# Patient Record
Sex: FEMALE | Race: WHITE | NOT HISPANIC OR LATINO | Employment: UNEMPLOYED | URBAN - METROPOLITAN AREA
[De-identification: names, ages, dates, MRNs, and addresses within clinical notes are randomized per-mention and may not be internally consistent; named-entity substitution may affect disease eponyms.]

---

## 2017-01-04 ENCOUNTER — ALLSCRIPTS OFFICE VISIT (OUTPATIENT)
Dept: OTHER | Facility: OTHER | Age: 57
End: 2017-01-04

## 2017-01-19 DIAGNOSIS — Z12.39 ENCOUNTER FOR OTHER SCREENING FOR MALIGNANT NEOPLASM OF BREAST: ICD-10-CM

## 2017-01-25 ENCOUNTER — APPOINTMENT (OUTPATIENT)
Dept: LAB | Facility: HOSPITAL | Age: 57
End: 2017-01-25
Attending: INTERNAL MEDICINE
Payer: COMMERCIAL

## 2017-01-25 ENCOUNTER — TRANSCRIBE ORDERS (OUTPATIENT)
Dept: ADMINISTRATIVE | Facility: HOSPITAL | Age: 57
End: 2017-01-25

## 2017-01-25 DIAGNOSIS — I10 ESSENTIAL (PRIMARY) HYPERTENSION: ICD-10-CM

## 2017-01-25 LAB
ALBUMIN SERPL BCP-MCNC: 3.6 G/DL (ref 3.5–5)
ALP SERPL-CCNC: 132 U/L (ref 46–116)
ALT SERPL W P-5'-P-CCNC: 28 U/L (ref 12–78)
ANION GAP SERPL CALCULATED.3IONS-SCNC: 13 MMOL/L (ref 4–13)
AST SERPL W P-5'-P-CCNC: 14 U/L (ref 5–45)
BILIRUB SERPL-MCNC: 0.3 MG/DL (ref 0.2–1)
BUN SERPL-MCNC: 11 MG/DL (ref 5–25)
CALCIUM SERPL-MCNC: 8.9 MG/DL (ref 8.3–10.1)
CHLORIDE SERPL-SCNC: 103 MMOL/L (ref 100–108)
CHOLEST SERPL-MCNC: 171 MG/DL (ref 50–200)
CO2 SERPL-SCNC: 24 MMOL/L (ref 21–32)
CREAT SERPL-MCNC: 0.79 MG/DL (ref 0.6–1.3)
ERYTHROCYTE [DISTWIDTH] IN BLOOD BY AUTOMATED COUNT: 14.5 % (ref 11.6–15.1)
GFR SERPL CREATININE-BSD FRML MDRD: >60 ML/MIN/1.73SQ M
GLUCOSE SERPL-MCNC: 225 MG/DL (ref 65–140)
HCT VFR BLD AUTO: 44.9 % (ref 34.8–46.1)
HDLC SERPL-MCNC: 44 MG/DL (ref 40–60)
HGB BLD-MCNC: 14.8 G/DL (ref 11.5–15.4)
LDLC SERPL CALC-MCNC: 100 MG/DL (ref 0–100)
MCH RBC QN AUTO: 29.3 PG (ref 26.8–34.3)
MCHC RBC AUTO-ENTMCNC: 33 G/DL (ref 31.4–37.4)
MCV RBC AUTO: 89 FL (ref 82–98)
PLATELET # BLD AUTO: 272 THOUSANDS/UL (ref 149–390)
PMV BLD AUTO: 10 FL (ref 8.9–12.7)
POTASSIUM SERPL-SCNC: 4.2 MMOL/L (ref 3.5–5.3)
PROT SERPL-MCNC: 7.1 G/DL (ref 6.4–8.2)
RBC # BLD AUTO: 5.05 MILLION/UL (ref 3.81–5.12)
SODIUM SERPL-SCNC: 140 MMOL/L (ref 136–145)
TRIGL SERPL-MCNC: 134 MG/DL
WBC # BLD AUTO: 13.4 THOUSAND/UL (ref 4.31–10.16)

## 2017-01-25 PROCEDURE — 85027 COMPLETE CBC AUTOMATED: CPT

## 2017-01-25 PROCEDURE — 80061 LIPID PANEL: CPT

## 2017-01-25 PROCEDURE — 36415 COLL VENOUS BLD VENIPUNCTURE: CPT

## 2017-01-25 PROCEDURE — 80053 COMPREHEN METABOLIC PANEL: CPT

## 2017-02-28 ENCOUNTER — TRANSCRIBE ORDERS (OUTPATIENT)
Dept: LAB | Facility: CLINIC | Age: 57
End: 2017-02-28

## 2017-02-28 ENCOUNTER — APPOINTMENT (OUTPATIENT)
Dept: LAB | Facility: CLINIC | Age: 57
End: 2017-02-28
Payer: COMMERCIAL

## 2017-02-28 ENCOUNTER — ALLSCRIPTS OFFICE VISIT (OUTPATIENT)
Dept: OTHER | Facility: OTHER | Age: 57
End: 2017-02-28

## 2017-02-28 DIAGNOSIS — E11.9 TYPE 2 DIABETES MELLITUS WITHOUT COMPLICATIONS (HCC): ICD-10-CM

## 2017-02-28 LAB
CREAT UR-MCNC: 54 MG/DL
EST. AVERAGE GLUCOSE BLD GHB EST-MCNC: 232 MG/DL
HBA1C MFR BLD: 9.7 % (ref 4.2–6.3)
MICROALBUMIN UR-MCNC: 6.1 MG/L (ref 0–20)
MICROALBUMIN/CREAT 24H UR: 11 MG/G CREATININE (ref 0–30)

## 2017-02-28 PROCEDURE — 82043 UR ALBUMIN QUANTITATIVE: CPT

## 2017-02-28 PROCEDURE — 36415 COLL VENOUS BLD VENIPUNCTURE: CPT

## 2017-02-28 PROCEDURE — 82570 ASSAY OF URINE CREATININE: CPT

## 2017-02-28 PROCEDURE — 83036 HEMOGLOBIN GLYCOSYLATED A1C: CPT

## 2017-03-08 ENCOUNTER — GENERIC CONVERSION - ENCOUNTER (OUTPATIENT)
Dept: OTHER | Facility: OTHER | Age: 57
End: 2017-03-08

## 2017-04-05 ENCOUNTER — ALLSCRIPTS OFFICE VISIT (OUTPATIENT)
Dept: OTHER | Facility: OTHER | Age: 57
End: 2017-04-05

## 2017-04-05 ENCOUNTER — TRANSCRIBE ORDERS (OUTPATIENT)
Dept: LAB | Facility: CLINIC | Age: 57
End: 2017-04-05

## 2017-04-05 ENCOUNTER — APPOINTMENT (OUTPATIENT)
Dept: LAB | Facility: CLINIC | Age: 57
End: 2017-04-05
Payer: COMMERCIAL

## 2017-04-05 DIAGNOSIS — E11.9 TYPE 2 DIABETES MELLITUS WITHOUT COMPLICATIONS (HCC): ICD-10-CM

## 2017-04-05 DIAGNOSIS — E11.9 DIABETES MELLITUS WITH NO COMPLICATION (HCC): Primary | ICD-10-CM

## 2017-04-05 LAB
ALBUMIN SERPL BCP-MCNC: 3.7 G/DL (ref 3.5–5)
ALP SERPL-CCNC: 101 U/L (ref 46–116)
ALT SERPL W P-5'-P-CCNC: 29 U/L (ref 12–78)
ANION GAP SERPL CALCULATED.3IONS-SCNC: 7 MMOL/L (ref 4–13)
AST SERPL W P-5'-P-CCNC: 10 U/L (ref 5–45)
BILIRUB SERPL-MCNC: 0.28 MG/DL (ref 0.2–1)
BUN SERPL-MCNC: 13 MG/DL (ref 5–25)
CALCIUM SERPL-MCNC: 9 MG/DL (ref 8.3–10.1)
CHLORIDE SERPL-SCNC: 104 MMOL/L (ref 100–108)
CHOLEST SERPL-MCNC: 132 MG/DL (ref 50–200)
CO2 SERPL-SCNC: 25 MMOL/L (ref 21–32)
CREAT SERPL-MCNC: 0.85 MG/DL (ref 0.6–1.3)
ERYTHROCYTE [DISTWIDTH] IN BLOOD BY AUTOMATED COUNT: 14.5 % (ref 11.6–15.1)
GFR SERPL CREATININE-BSD FRML MDRD: >60 ML/MIN/1.73SQ M
GLUCOSE P FAST SERPL-MCNC: 129 MG/DL (ref 65–99)
HCT VFR BLD AUTO: 44.4 % (ref 34.8–46.1)
HDLC SERPL-MCNC: 34 MG/DL (ref 40–60)
HGB BLD-MCNC: 14.7 G/DL (ref 11.5–15.4)
LDLC SERPL CALC-MCNC: 64 MG/DL (ref 0–100)
MCH RBC QN AUTO: 29.6 PG (ref 26.8–34.3)
MCHC RBC AUTO-ENTMCNC: 33.1 G/DL (ref 31.4–37.4)
MCV RBC AUTO: 90 FL (ref 82–98)
PLATELET # BLD AUTO: 258 THOUSANDS/UL (ref 149–390)
PMV BLD AUTO: 10.9 FL (ref 8.9–12.7)
POTASSIUM SERPL-SCNC: 3.8 MMOL/L (ref 3.5–5.3)
PROT SERPL-MCNC: 7.6 G/DL (ref 6.4–8.2)
RBC # BLD AUTO: 4.96 MILLION/UL (ref 3.81–5.12)
SODIUM SERPL-SCNC: 136 MMOL/L (ref 136–145)
TRIGL SERPL-MCNC: 170 MG/DL
WBC # BLD AUTO: 13.86 THOUSAND/UL (ref 4.31–10.16)

## 2017-04-05 PROCEDURE — 80053 COMPREHEN METABOLIC PANEL: CPT

## 2017-04-05 PROCEDURE — 80061 LIPID PANEL: CPT

## 2017-04-05 PROCEDURE — 85027 COMPLETE CBC AUTOMATED: CPT

## 2017-04-05 PROCEDURE — 36415 COLL VENOUS BLD VENIPUNCTURE: CPT

## 2017-04-05 PROCEDURE — 83036 HEMOGLOBIN GLYCOSYLATED A1C: CPT

## 2017-04-06 LAB
EST. AVERAGE GLUCOSE BLD GHB EST-MCNC: 203 MG/DL
HBA1C MFR BLD: 8.7 % (ref 4.2–6.3)

## 2017-05-01 ENCOUNTER — HOSPITAL ENCOUNTER (OUTPATIENT)
Dept: NON INVASIVE DIAGNOSTICS | Facility: HOSPITAL | Age: 57
Discharge: HOME | End: 2017-05-01
Attending: INTERNAL MEDICINE
Payer: COMMERCIAL

## 2017-05-01 ENCOUNTER — HOSPITAL ENCOUNTER (OUTPATIENT)
Dept: MAMMOGRAPHY | Facility: HOSPITAL | Age: 57
Discharge: HOME | End: 2017-05-01
Attending: INTERNAL MEDICINE
Payer: COMMERCIAL

## 2017-05-01 DIAGNOSIS — Z12.39 ENCOUNTER FOR OTHER SCREENING FOR MALIGNANT NEOPLASM OF BREAST: ICD-10-CM

## 2017-05-01 DIAGNOSIS — E11.9 DIABETES MELLITUS WITH NO COMPLICATION (HCC): ICD-10-CM

## 2017-05-01 PROCEDURE — 77063 BREAST TOMOSYNTHESIS BI: CPT

## 2017-05-01 PROCEDURE — 93005 ELECTROCARDIOGRAM TRACING: CPT

## 2017-05-01 PROCEDURE — G0202 SCR MAMMO BI INCL CAD: HCPCS

## 2017-05-02 LAB
ATRIAL RATE: 80 BPM
P AXIS: 25 DEGREES
PR INTERVAL: 150 MS
QRS AXIS: -5 DEGREES
QRSD INTERVAL: 132 MS
QT INTERVAL: 396 MS
QTC INTERVAL: 456 MS
T WAVE AXIS: 20 DEGREES
VENTRICULAR RATE: 80 BPM

## 2017-06-20 ENCOUNTER — ALLSCRIPTS OFFICE VISIT (OUTPATIENT)
Dept: OTHER | Facility: OTHER | Age: 57
End: 2017-06-20

## 2017-06-21 ENCOUNTER — HOSPITAL ENCOUNTER (EMERGENCY)
Facility: HOSPITAL | Age: 57
Discharge: HOME | End: 2017-06-21
Attending: EMERGENCY MEDICINE | Admitting: EMERGENCY MEDICINE
Payer: COMMERCIAL

## 2017-06-21 VITALS
WEIGHT: 208.34 LBS | RESPIRATION RATE: 18 BRPM | OXYGEN SATURATION: 98 % | DIASTOLIC BLOOD PRESSURE: 88 MMHG | TEMPERATURE: 98 F | HEART RATE: 100 BPM | BODY MASS INDEX: 35.76 KG/M2 | SYSTOLIC BLOOD PRESSURE: 189 MMHG

## 2017-06-21 DIAGNOSIS — L03.90 CELLULITIS: Primary | ICD-10-CM

## 2017-06-21 PROCEDURE — 87205 SMEAR GRAM STAIN: CPT | Performed by: EMERGENCY MEDICINE

## 2017-06-21 PROCEDURE — 87070 CULTURE OTHR SPECIMN AEROBIC: CPT | Performed by: EMERGENCY MEDICINE

## 2017-06-21 PROCEDURE — 99282 EMERGENCY DEPT VISIT SF MDM: CPT

## 2017-06-21 RX ORDER — CLINDAMYCIN HYDROCHLORIDE 300 MG/1
300 CAPSULE ORAL 4 TIMES DAILY
Qty: 40 CAPSULE | Refills: 0 | Status: SHIPPED | OUTPATIENT
Start: 2017-06-21 | End: 2017-07-01

## 2017-06-21 RX ORDER — CLINDAMYCIN HYDROCHLORIDE 150 MG/1
300 CAPSULE ORAL ONCE
Status: COMPLETED | OUTPATIENT
Start: 2017-06-21 | End: 2017-06-21

## 2017-06-21 RX ORDER — LOSARTAN POTASSIUM 50 MG/1
50 TABLET ORAL DAILY
COMMUNITY
End: 2018-03-19 | Stop reason: SDUPTHER

## 2017-06-21 RX ORDER — GLIPIZIDE 5 MG/1
5 TABLET ORAL DAILY
COMMUNITY
End: 2018-03-19 | Stop reason: SDUPTHER

## 2017-06-21 RX ADMIN — CLINDAMYCIN HYDROCHLORIDE 300 MG: 150 CAPSULE ORAL at 22:49

## 2017-06-24 LAB
BACTERIA WND AEROBE CULT: NORMAL
GRAM STN SPEC: NORMAL
GRAM STN SPEC: NORMAL

## 2017-06-26 ENCOUNTER — ALLSCRIPTS OFFICE VISIT (OUTPATIENT)
Dept: OTHER | Facility: OTHER | Age: 57
End: 2017-06-26

## 2017-06-26 DIAGNOSIS — E11.9 TYPE 2 DIABETES MELLITUS WITHOUT COMPLICATIONS (HCC): ICD-10-CM

## 2017-06-29 ENCOUNTER — APPOINTMENT (EMERGENCY)
Dept: RADIOLOGY | Facility: HOSPITAL | Age: 57
End: 2017-06-29
Payer: COMMERCIAL

## 2017-06-29 ENCOUNTER — HOSPITAL ENCOUNTER (EMERGENCY)
Facility: HOSPITAL | Age: 57
Discharge: HOME | End: 2017-06-29
Attending: EMERGENCY MEDICINE
Payer: COMMERCIAL

## 2017-06-29 VITALS
OXYGEN SATURATION: 98 % | BODY MASS INDEX: 36.32 KG/M2 | WEIGHT: 212.74 LBS | SYSTOLIC BLOOD PRESSURE: 165 MMHG | HEIGHT: 64 IN | TEMPERATURE: 98.6 F | DIASTOLIC BLOOD PRESSURE: 66 MMHG | RESPIRATION RATE: 16 BRPM | HEART RATE: 86 BPM

## 2017-06-29 DIAGNOSIS — S99.922A TOE INJURY, LEFT, INITIAL ENCOUNTER: Primary | ICD-10-CM

## 2017-06-29 PROCEDURE — 99283 EMERGENCY DEPT VISIT LOW MDM: CPT

## 2017-06-29 PROCEDURE — 73630 X-RAY EXAM OF FOOT: CPT

## 2017-06-29 RX ORDER — ALPRAZOLAM 0.25 MG/1
0.25 TABLET ORAL
COMMUNITY
End: 2022-01-03 | Stop reason: ALTCHOICE

## 2017-06-29 RX ORDER — IBUPROFEN 600 MG/1
600 TABLET ORAL ONCE
Status: COMPLETED | OUTPATIENT
Start: 2017-06-29 | End: 2017-06-29

## 2017-06-29 RX ORDER — IBUPROFEN 600 MG/1
600 TABLET ORAL EVERY 6 HOURS PRN
Qty: 30 TABLET | Refills: 0 | Status: SHIPPED | OUTPATIENT
Start: 2017-06-29 | End: 2018-10-03

## 2017-06-29 RX ORDER — NITROGLYCERIN 0.4 MG/1
0.4 TABLET SUBLINGUAL
COMMUNITY
End: 2019-11-04

## 2017-06-29 RX ADMIN — IBUPROFEN 600 MG: 600 TABLET, FILM COATED ORAL at 09:54

## 2017-07-19 ENCOUNTER — APPOINTMENT (OUTPATIENT)
Dept: LAB | Facility: CLINIC | Age: 57
End: 2017-07-19
Payer: COMMERCIAL

## 2017-07-19 ENCOUNTER — TRANSCRIBE ORDERS (OUTPATIENT)
Dept: LAB | Facility: CLINIC | Age: 57
End: 2017-07-19

## 2017-07-19 DIAGNOSIS — E11.9 TYPE 2 DIABETES MELLITUS WITHOUT COMPLICATIONS (HCC): ICD-10-CM

## 2017-07-19 LAB
ALBUMIN SERPL BCP-MCNC: 3.6 G/DL (ref 3.5–5)
ALP SERPL-CCNC: 102 U/L (ref 46–116)
ALT SERPL W P-5'-P-CCNC: 19 U/L (ref 12–78)
ANION GAP SERPL CALCULATED.3IONS-SCNC: 8 MMOL/L (ref 4–13)
AST SERPL W P-5'-P-CCNC: 10 U/L (ref 5–45)
BILIRUB SERPL-MCNC: 0.24 MG/DL (ref 0.2–1)
BUN SERPL-MCNC: 13 MG/DL (ref 5–25)
CALCIUM SERPL-MCNC: 8.8 MG/DL (ref 8.3–10.1)
CHLORIDE SERPL-SCNC: 106 MMOL/L (ref 100–108)
CHOLEST SERPL-MCNC: 154 MG/DL (ref 50–200)
CO2 SERPL-SCNC: 25 MMOL/L (ref 21–32)
CREAT SERPL-MCNC: 0.73 MG/DL (ref 0.6–1.3)
ERYTHROCYTE [DISTWIDTH] IN BLOOD BY AUTOMATED COUNT: 15.3 % (ref 11.6–15.1)
EST. AVERAGE GLUCOSE BLD GHB EST-MCNC: 148 MG/DL
GFR SERPL CREATININE-BSD FRML MDRD: >60 ML/MIN/1.73SQ M
GLUCOSE P FAST SERPL-MCNC: 94 MG/DL (ref 65–99)
HBA1C MFR BLD: 6.8 % (ref 4.2–6.3)
HCT VFR BLD AUTO: 40.6 % (ref 34.8–46.1)
HDLC SERPL-MCNC: 40 MG/DL (ref 40–60)
HGB BLD-MCNC: 13.3 G/DL (ref 11.5–15.4)
LDLC SERPL CALC-MCNC: 97 MG/DL (ref 0–100)
MCH RBC QN AUTO: 29.1 PG (ref 26.8–34.3)
MCHC RBC AUTO-ENTMCNC: 32.8 G/DL (ref 31.4–37.4)
MCV RBC AUTO: 89 FL (ref 82–98)
PLATELET # BLD AUTO: 291 THOUSANDS/UL (ref 149–390)
PMV BLD AUTO: 10.9 FL (ref 8.9–12.7)
POTASSIUM SERPL-SCNC: 3.6 MMOL/L (ref 3.5–5.3)
PROT SERPL-MCNC: 7.6 G/DL (ref 6.4–8.2)
RBC # BLD AUTO: 4.57 MILLION/UL (ref 3.81–5.12)
SODIUM SERPL-SCNC: 139 MMOL/L (ref 136–145)
TRIGL SERPL-MCNC: 85 MG/DL
WBC # BLD AUTO: 12.85 THOUSAND/UL (ref 4.31–10.16)

## 2017-07-19 PROCEDURE — 85027 COMPLETE CBC AUTOMATED: CPT

## 2017-07-19 PROCEDURE — 83036 HEMOGLOBIN GLYCOSYLATED A1C: CPT

## 2017-07-19 PROCEDURE — 80061 LIPID PANEL: CPT

## 2017-07-19 PROCEDURE — 80053 COMPREHEN METABOLIC PANEL: CPT

## 2017-07-19 PROCEDURE — 36415 COLL VENOUS BLD VENIPUNCTURE: CPT

## 2017-07-26 ENCOUNTER — ALLSCRIPTS OFFICE VISIT (OUTPATIENT)
Dept: OTHER | Facility: OTHER | Age: 57
End: 2017-07-26

## 2017-11-14 ENCOUNTER — GENERIC CONVERSION - ENCOUNTER (OUTPATIENT)
Dept: OTHER | Facility: OTHER | Age: 57
End: 2017-11-14

## 2017-11-14 DIAGNOSIS — I10 ESSENTIAL (PRIMARY) HYPERTENSION: ICD-10-CM

## 2017-11-14 DIAGNOSIS — E66.9 OBESITY: ICD-10-CM

## 2017-11-14 DIAGNOSIS — E78.5 HYPERLIPIDEMIA: ICD-10-CM

## 2017-11-14 DIAGNOSIS — F41.9 ANXIETY DISORDER: ICD-10-CM

## 2017-11-14 DIAGNOSIS — I25.10 ATHEROSCLEROTIC HEART DISEASE OF NATIVE CORONARY ARTERY WITHOUT ANGINA PECTORIS: ICD-10-CM

## 2017-11-14 DIAGNOSIS — E11.9 TYPE 2 DIABETES MELLITUS WITHOUT COMPLICATIONS (HCC): ICD-10-CM

## 2017-11-15 ENCOUNTER — LAB (OUTPATIENT)
Dept: LAB | Facility: CLINIC | Age: 57
End: 2017-11-15
Payer: COMMERCIAL

## 2017-11-15 ENCOUNTER — TRANSCRIBE ORDERS (OUTPATIENT)
Dept: LAB | Facility: CLINIC | Age: 57
End: 2017-11-15

## 2017-11-15 DIAGNOSIS — Z12.11 ENCOUNTER FOR SCREENING FOR MALIGNANT NEOPLASM OF COLON: Primary | ICD-10-CM

## 2017-11-15 DIAGNOSIS — Z12.11 ENCOUNTER FOR SCREENING FOR MALIGNANT NEOPLASM OF COLON: ICD-10-CM

## 2017-11-15 LAB — HEMOCCULT STL QL IA: NEGATIVE

## 2017-11-15 PROCEDURE — G0328 FECAL BLOOD SCRN IMMUNOASSAY: HCPCS

## 2018-01-11 NOTE — PROGRESS NOTES
Assessment    1  Encounter for preventive health examination (V70 0) (Z00 00)    Plan  Atherosclerotic heart disease of native coronary artery without angina pectoris    · Metoprolol Tartrate 25 MG Oral Tablet; TAKE 1 TABLET TWICE DAILY  PMH: Encounter for diabetes type 2 eye exam    · *VB - Eye Exam; Status:Resulted - Requires Verification,Retrospective Authorization;    Done: 58UAV9878 01:01PM  Type 2 diabetes mellitus    · MetFORMIN HCl - 1000 MG Oral Tablet; take 1 tablet by mouth twice a day    Discussion/Summary    Health maintenance  - Anticipatory guidance given re: diet, exercise, vaccines  - Tobacco cessation: in action stage- continue to assess on fu  - Vaccines up to date  - Mammogram normal, referred for pap and colonoscopy  - Diabetes well controlled on current therapy  - Referred for counseling for symptoms of anxiety  Possible side effects of new medications were reviewed with the patient/guardian today  The treatment plan was reviewed with the patient/guardian  The patient/guardian understands and agrees with the treatment plan      Chief Complaint  physical      History of Present Illness  HPI: Abdoul Thompson is here for a routine physical   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  She rates her health as fair  She does exercise for 2 hrs/week and eats a healthy diet  She has a hx anxiety not on pharmacotherapy at present and has been referred for counseling  She is a current smoker and is trying to quit but unsuccessfully, does not drink alcohol or use recreational drugs  She is menopausal  No reports of high risk sexual behavior  Review of Systems    Constitutional: No fever, no chills, feels well, no tiredness, no recent weight gain or weight loss  Eyes: No complaints of eye pain, no red eyes, no eyesight problems, no discharge, no dry eyes, no itching of eyes     ENT: no complaints of earache, no loss of hearing, no nose bleeds, no nasal discharge, no sore throat, no hoarseness  Cardiovascular: No complaints of slow heart rate, no fast heart rate, no chest pain, no palpitations, no leg claudication, no lower extremity edema  Respiratory: No complaints of shortness of breath, no wheezing, no cough, no SOB on exertion, no orthopnea, no PND  Gastrointestinal: No complaints of abdominal pain, no constipation, no nausea or vomiting, no diarrhea, no bloody stools  Genitourinary: No complaints of dysuria, no incontinence, no pelvic pain, no dysmenorrhea, no vaginal discharge or bleeding  Musculoskeletal: No complaints of arthralgias, no myalgias, no joint swelling or stiffness, no limb pain or swelling  Integumentary: No complaints of skin rash or lesions, no itching, no skin wounds, no breast pain or lump  Neurological: No complaints of headache, no confusion, no convulsions, no numbness, no dizziness or fainting, no tingling, no limb weakness, no difficulty walking  Psychiatric: Not suicidal, no sleep disturbance, no anxiety or depression, no change in personality, no emotional problems  Endocrine: No complaints of proptosis, no hot flashes, no muscle weakness, no deepening of the voice, no feelings of weakness  Hematologic/Lymphatic: No complaints of swollen glands, no swollen glands in the neck, does not bleed easily, does not bruise easily  Active Problems    1  Anxiety (300 00) (F41 9)   2  Atherosclerotic heart disease of native coronary artery without angina pectoris (414 01)   (I25 10)   3  Current tobacco use (305 1) (Z72 0)   4  Dyslipidemia (272 4) (E78 5)   5  Hypertension (401 9) (I10)   6  Obesity (278 00) (E66 9)   7   Type 2 diabetes mellitus (250 00) (E11 9)    Past Medical History    · History of Cellulitis of extremity (L03 119)   · History of Chronic neck pain (723 1,338 29) (M54 2,G89 29)   · History of left bundle branch block (LBBB) (V12 59) (Z86 79)   · History of Mild depression (311) (F32 0)   · History of Tonsillar hypertrophy (474 11) (J35 1)    Surgical History    · History of Cath Stent 1 Type Drug-Eluting    Family History  Mother    · Family history of Arthritis (V17 7)   · Family history of Colon Cancer (V16 0)   · Family history of Coronary Artery Disease (V17 49)   · Family history of Fibrocystic Disease Of Breast   · Family history of Hypertension (V17 49)   · Family history of Ischemic Dilated Cardiomyopathy  Father    · Family history of Arthritis (V17 7)   · Family history of Chronic Kidney Disease (NKF Classification)   · Family history of Coronary Artery Disease (V17 49)   · Family history of Gout (V18 19)   · Family history of Prostate Cancer (V16 42)   · Family history of Pure Hypercholesterolemia   · Family history of Stroke Syndrome (V17 1)   · Family history of Type 2 Diabetes Mellitus  Daughter    · Family history of Thrombocytopenia  Sister    · Family history of Hypertension (V17 49)   · Family history of Hypothyroidism   · Family history of IVP - Solitary Kidney Calculus   · Family history of Rheumatoid Arthritis  Brother    · Family history of Alcoholism   · Family history of Bipolar Disorder NOS   · Family history of IVP - Solitary Kidney Calculus   · Family history of Primary Adrenal Insufficiency ('[place] Disease')   · Family history of Ruptured Cerebral Aneurysm  Maternal Aunt    · Family history of Breast Cancer (V16 3)   · Family history of Peripheral Vascular Disease  Maternal Uncle    · Family history of Lung Cancer (V16 1)   · Family history of Type 2 Diabetes Mellitus    Social History    · Denied: History of Alcohol Use (History)   · Current every day smoker (305 1) (F17 200)   · Current tobacco use (305 1) (Z72 0)   · Denied: History of Drug Use   · Marital History -  (V61 03)   · has 2 children   · Marital History - Single   · Occupation:   · administration  [employer]    Current Meds   1  Accu-Chek Roxana Plus In Vitro Strip; TEST TWICE DAILY;    Therapy: 03ABJ5125 to (Evaluate:54Hzq8033)  Requested for: 95VCF1014; Last   Rx:60Aol1906 Ordered   2  Accu-Chek Roxana Plus w/Device Kit; USE AS DIRECTED  measure blood glucose fasting   and 2 hrs after meals - four times a day; Therapy: 59NSC0056 to (Last Rx:03Mar2017)  Requested for: 48XZH0589 Ordered   3  ALPRAZolam 0 25 MG Oral Tablet; take 1 tablet daily prn; Therapy: 80QUN3171 to (Evaluate:25Oct2016); Last Rx:87Wbi8764 Ordered   4  Aspirin 325 MG Oral Tablet; Take 1 tablet daily Recorded   5  Furosemide 40 MG Oral Tablet; TAKE 1 TABLET DAILY; Therapy: 35Vga9402 to ([id number])  Requested for: 27Tio9241; Last   Rx:37Ibo1050 Ordered   6  GlipiZIDE 5 MG Oral Tablet; Take 1 tablet daily; Therapy: 89HLZ9742 to ([facility])  Requested for: 33VII2795; Last   Rx:14Ocm0580 Ordered   7  Januvia 100 MG Oral Tablet; take 1 tablet by mouth once daily; Therapy: 14XRK4283 to ([facility])  Requested for: 99HQM7469; Last   Rx:93Xcv7808 Ordered   8  Losartan Potassium 50 MG Oral Tablet; take 1 tablet by mouth once daily; Therapy: 58BVU5628 to (Evaluate:67Beq1023)  Requested for: 20Mar2017; Last   Rx:20Mar2017 Ordered   9  MetFORMIN HCl - 1000 MG Oral Tablet; take 1 tablet by mouth twice a day; Therapy: 56IES9115 to (Thyra Muta)  Requested for: 21QTF3922; Last   Rx:48Ndi2873 Ordered   10  Methocarbamol 500 MG Oral Tablet; Take 1 or 2 tabs every 6 hours as needed; Therapy: (Jina Nam) to Recorded   11  Metoprolol Tartrate 25 MG Oral Tablet; TAKE 1 TABLET TWICE DAILY; Therapy: 61DTK3866 to (Evaluate:14Apr2018)  Requested for: 19Apr2017; Last    Rx:19Apr2017 Ordered   12  Multiple Vitamin TABS; Therapy: (Recorded:03Vzu4767) to Recorded   13  Nitroglycerin 0 4 MG Sublingual Tablet Sublingual; DISSOLVE 1 TABLET UNDER THE    TONGUE AS NEEDED FOR CHEST PAIN  Requested for: 20Jun2017; Last    Rx:20Jun2017 Ordered   14  Simvastatin 40 MG Oral Tablet; Take 1 tablet daily;     Therapy: 23Aug2016 to (Last Rx:20Apr2017)  Requested for: 20Apr2017 Ordered   15  Vitamin B12 TABS; TAKE 1 TABLET DAILY AS DIRECTED; Therapy: (Guillermo Ivey) to Recorded   16  Vitamin D 2000 UNIT Oral Capsule; take 1 capsule daily; Therapy: (Recorded:20Jun2017) to Recorded    Allergies    1  HydroCHLOROthiazide TABS   2  ACE Inhibitors   3  Wellbutrin TABS    Vitals   Recorded: 81Ahj5896 12:52PM   Temperature 98 6 F   Heart Rate 81   Respiration 18   Systolic 979   Diastolic 66   Height 5 ft 4 in   Weight 201 lb    BMI Calculated 34 5   BSA Calculated 1 96   O2 Saturation 98     Physical Exam    Constitutional   General appearance: No acute distress, well appearing and well nourished  Eyes   Conjunctiva and lids: No swelling, erythema or discharge  Pupils and irises: Equal, round and reactive to light  Ears, Nose, Mouth, and Throat   External inspection of ears and nose: Normal     Otoscopic examination: Tympanic membranes translucent with normal light reflex  Canals patent without erythema  Oropharynx: Normal with no erythema, edema, exudate or lesions  Pulmonary   Respiratory effort: No increased work of breathing or signs of respiratory distress  Auscultation of lungs: Clear to auscultation  Cardiovascular   Palpation of heart: Normal PMI, no thrills  Auscultation of heart: Normal rate and rhythm, normal S1 and S2, without murmurs  Examination of extremities for edema and/or varicosities: Normal     Abdomen   Abdomen: Non-tender, no masses  Liver and spleen: No hepatomegaly or splenomegaly  Lymphatic   Palpation of lymph nodes in neck: No lymphadenopathy  Musculoskeletal   Gait and station: Normal     Digits and nails: Normal without clubbing or cyanosis  Inspection/palpation of joints, bones, and muscles: Normal     Skin   Skin and subcutaneous tissue: Normal without rashes or lesions  Neurologic   Cranial nerves: Cranial nerves 2-12 intact  Reflexes: 2+ and symmetric  Sensation: No sensory loss      Psychiatric   Orientation to person, place, and time: Normal     Mood and affect: Normal        Results/Data  *VB - Eye Exam 38PNO5659 01:01PM Javed Petersen     Test Name Result Flag Reference   Retinopathy Screening      No Retinopathy on exam                         Future Appointments    Date/Time Provider Specialty Site   11/15/2017 11:00 AM MD Waleska Hankins [address]     Signatures   Electronically signed by : Sonia Servin MD; Jul 26 2017  1:31PM EST                       (Author)

## 2018-01-11 NOTE — PROGRESS NOTES
Chief Complaint  PPd test pt aware has to come back at 48-72 hrs      Active Problems    1  Anxiety (300 00) (F41 9)   2  Coronary artery disease (414 00) (I25 10)   3  Dietary counseling (V65 3) (Z71 3)   4  Dyslipidemia (272 4) (E78 5)   5  Exercise counseling (V65 41) (Z71 89)   6  Fatigue (780 79) (R53 83)   7  Hyperglycemia (790 29) (R73 9)   8  Hypertension (401 9) (I10)   9  Left bundle branch block (426 3) (I44 7)   10  Need for influenza vaccination (V04 81) (Z23)   11  Obesity (278 00) (E66 9)   12  PPD screening test (V74 1) (Z11 1)   13  Screening for breast cancer (V76 10) (Z12 39)   14  Screening for colon cancer (V76 51) (Z12 11)   15  Screening for depression (V79 0) (Z13 89)   16  Tobacco use (305 1) (Z72 0)    Current Meds   1  ALPRAZolam 0 25 MG Oral Tablet; TAKE 1 TABLET 3 times daily PRN; Therapy: 24ESJ2458 to (Evaluate:03Oct2015); Last Rx:43Dzu6168 Ordered   2  Aspirin 325 MG Oral Tablet; Take 1 tablet daily Recorded   3  Effient 10 MG Oral Tablet; Take 1 tablet daily; Last Rx:10Mar2015 Ordered   4  Furosemide 40 MG Oral Tablet; TAKE 1 TABLET DAILY; Therapy: 16Rsb4762 to (Johnie Daley)  Requested for: 53Gvc9170; Last   Rx:79Udo4501 Ordered   5  Losartan Potassium 50 MG Oral Tablet; Take 1 tablet daily; Therapy: 73PDN5070 to (Nicky Christensen)  Requested for: 74IMR6709; Last   Rx:03Nov2015 Ordered   6  Metoprolol Tartrate 25 MG Oral Tablet; TAKE 1 TABLET TWICE DAILY; Therapy: 64GRB9815 to (Evaluate:05Jpm0001)  Requested for: 65WDO3428; Last   Rx:06Nov2015 Ordered   7  Multiple Vitamin TABS; Therapy: (Recorded:24Dvw6330) to Recorded   8  Pravastatin Sodium 40 MG Oral Tablet; TAKE 2 TABLETS AT BEDTIME; Therapy: 32BVW9089 to (Evaluate:32Auh2288)  Requested for: 41Xyr9315; Last   Rx:90Cmu8838 Ordered    Allergies    1  Hydrochlorothiazide TABS   2   ACE Inhibitors    Plan  PPD screening test    · PPD    Signatures   Electronically signed by : Magdalena Hoyos MD; Jan 20 2016 9:59AM EST                       (Author)

## 2018-01-12 VITALS
WEIGHT: 211.13 LBS | SYSTOLIC BLOOD PRESSURE: 126 MMHG | OXYGEN SATURATION: 97 % | HEART RATE: 112 BPM | HEIGHT: 64 IN | BODY MASS INDEX: 36.05 KG/M2 | RESPIRATION RATE: 18 BRPM | TEMPERATURE: 97.8 F | DIASTOLIC BLOOD PRESSURE: 68 MMHG

## 2018-01-13 VITALS
WEIGHT: 201 LBS | DIASTOLIC BLOOD PRESSURE: 66 MMHG | BODY MASS INDEX: 34.31 KG/M2 | RESPIRATION RATE: 18 BRPM | HEIGHT: 64 IN | TEMPERATURE: 98.6 F | HEART RATE: 81 BPM | SYSTOLIC BLOOD PRESSURE: 150 MMHG | OXYGEN SATURATION: 98 %

## 2018-01-13 NOTE — PROGRESS NOTES
Chief Complaint  had ppd down on 1/19/2016  came back on 01/21/2016  results are negative      Active Problems    1  Anxiety (300 00) (F41 9)   2  Coronary artery disease (414 00) (I25 10)   3  Dietary counseling (V65 3) (Z71 3)   4  Dyslipidemia (272 4) (E78 5)   5  Exercise counseling (V65 41) (Z71 89)   6  Fatigue (780 79) (R53 83)   7  Hyperglycemia (790 29) (R73 9)   8  Hypertension (401 9) (I10)   9  Left bundle branch block (426 3) (I44 7)   10  Need for influenza vaccination (V04 81) (Z23)   11  Obesity (278 00) (E66 9)   12  PPD screening test (V74 1) (Z11 1)   13  Screening for breast cancer (V76 10) (Z12 39)   14  Screening for colon cancer (V76 51) (Z12 11)   15  Screening for depression (V79 0) (Z13 89)   16  Tobacco use (305 1) (Z72 0)    Current Meds   1  ALPRAZolam 0 25 MG Oral Tablet; TAKE 1 TABLET 3 times daily PRN; Therapy: 27JTG7173 to (Evaluate:03Oct2015); Last Rx:62Pmz2235 Ordered   2  Aspirin 325 MG Oral Tablet; Take 1 tablet daily Recorded   3  Effient 10 MG Oral Tablet; Take 1 tablet daily; Last Rx:10Mar2015 Ordered   4  Furosemide 40 MG Oral Tablet; TAKE 1 TABLET DAILY; Therapy: 31Ura4908 to (Tala Castano)  Requested for: 85Wnd2297; Last   Rx:25Qiz0848 Ordered   5  Losartan Potassium 50 MG Oral Tablet; Take 1 tablet daily; Therapy: 99MGX3273 to (Sharan Claude)  Requested for: 71AOI8632; Last   Rx:03Nov2015 Ordered   6  Metoprolol Tartrate 25 MG Oral Tablet; TAKE 1 TABLET TWICE DAILY; Therapy: 15TKN4175 to (Evaluate:31Oct2016)  Requested for: 67UPC8778; Last   Rx:06Nov2015 Ordered   7  Multiple Vitamin TABS; Therapy: (Recorded:73Imn3154) to Recorded   8  Pravastatin Sodium 40 MG Oral Tablet; TAKE 2 TABLETS AT BEDTIME; Therapy: 55TUP4928 to (Evaluate:40Iok0859)  Requested for: 81Jpn5753; Last   Rx:47Wmg0625 Ordered    Allergies    1  Hydrochlorothiazide TABS   2   ACE Inhibitors    Signatures   Electronically signed by : Natalie Berman MD; Jan 22 2016  8:32AM EST (Author)

## 2018-01-14 VITALS
BODY MASS INDEX: 34.88 KG/M2 | WEIGHT: 204.31 LBS | RESPIRATION RATE: 18 BRPM | SYSTOLIC BLOOD PRESSURE: 140 MMHG | HEART RATE: 107 BPM | HEIGHT: 64 IN | OXYGEN SATURATION: 96 % | DIASTOLIC BLOOD PRESSURE: 90 MMHG | TEMPERATURE: 99 F

## 2018-01-14 VITALS
DIASTOLIC BLOOD PRESSURE: 80 MMHG | HEART RATE: 84 BPM | HEIGHT: 64 IN | SYSTOLIC BLOOD PRESSURE: 164 MMHG | WEIGHT: 201 LBS | BODY MASS INDEX: 34.31 KG/M2

## 2018-01-15 VITALS
BODY MASS INDEX: 36.62 KG/M2 | OXYGEN SATURATION: 98 % | HEIGHT: 64 IN | HEART RATE: 102 BPM | RESPIRATION RATE: 18 BRPM | SYSTOLIC BLOOD PRESSURE: 126 MMHG | DIASTOLIC BLOOD PRESSURE: 82 MMHG | WEIGHT: 214.5 LBS | TEMPERATURE: 97.7 F

## 2018-01-18 NOTE — PROGRESS NOTES
Chief Complaint  ppd      Active Problems    1  Anxiety (300 00) (F41 9)   2  Atherosclerotic heart disease of native coronary artery without angina pectoris (414 01)   (I25 10)   3  Chronic neck pain (723 1,338 29) (M54 2,G89 29)   4  Current tobacco use (305 1) (Z72 0)   5  Dyslipidemia (272 4) (E78 5)   6  Hypertension (401 9) (I10)   7  Left bundle branch block (426 3) (I44 7)   8  Mild depression (311) (F32 0)   9  Need for influenza vaccination (V04 81) (Z23)   10  Obesity (278 00) (E66 9)    Current Meds   1  ALPRAZolam 0 25 MG Oral Tablet; take 1 tablet daily prn; Therapy: 50JYN4086 to (Evaluate:25Oct2016); Last Rx:13Wzl0264 Ordered   2  Aspirin 325 MG Oral Tablet; Take 1 tablet daily Recorded   3  BuPROPion HCl ER (XL) 150 MG Oral Tablet Extended Release 24 Hour; TAKE 1   TABLET DAILY AS DIRECTED; Therapy: 86GRL6476 to (Elieser Blevins)  Requested for: 81MHN1239; Last   Rx:28Nov2016 Ordered   4  Effient 10 MG Oral Tablet; Take 1 tablet daily; Last Rx:10Mar2015 Ordered   5  Furosemide 40 MG Oral Tablet; TAKE 1 TABLET DAILY; Therapy: 27Wfg0056 to (Hi Dewey)  Requested for: 23Ztq7940; Last   Rx:03Sep2015 Ordered   6  Losartan Potassium 50 MG Oral Tablet; take 1 tablet by mouth once daily; Therapy: 69BYC5818 to (Evaluate:17Mar2017)  Requested for: 26ZDN6521; Last   Rx:17Nov2016 Ordered   7  Methocarbamol 500 MG Oral Tablet; TAKE 1-2 TABLET Every 6 hours PRN spasm; Therapy: 63Hzo8491 to (Evaluate:10Oct2016)  Requested for: 11Thp9671; Last   Rx:35Fnu6919 Ordered   8  Metoprolol Tartrate 25 MG Oral Tablet; TAKE 1 TABLET TWICE DAILY; Therapy: 59GMM5772 to (Evaluate:17Mar2017)  Requested for: 72VEP2477; Last   Rx:22Mar2016 Ordered   9  Multiple Vitamin TABS; Therapy: (Recorded:03Sep2015) to Recorded   10  Simvastatin 40 MG Oral Tablet; Take 1 tablet daily; Therapy: 87Ulf4854 to (Last Rx:01Sep2016)  Requested for: 01Sep2016 Ordered    Allergies    1  HydroCHLOROthiazide TABS   2   ACE Inhibitors    Plan  Need for tuberculosis vaccination    · Tubersol 5 UNIT/0 1ML Intradermal Solution    Future Appointments    Date/Time Provider Specialty Site   02/28/2017 11:00 AM Chon Urena MD [address] [facility] PRIMARY [facility]ARE [place] [address]     Signatures   Electronically signed by : Kevin Dyson MD; Jan 4 2017  9:09AM EST                       (Author)

## 2018-01-22 VITALS
DIASTOLIC BLOOD PRESSURE: 70 MMHG | WEIGHT: 213.06 LBS | HEIGHT: 64 IN | HEART RATE: 96 BPM | SYSTOLIC BLOOD PRESSURE: 140 MMHG | TEMPERATURE: 97.6 F | BODY MASS INDEX: 36.37 KG/M2

## 2018-02-05 DIAGNOSIS — E11.9 TYPE 2 DIABETES MELLITUS WITHOUT COMPLICATION, UNSPECIFIED LONG TERM INSULIN USE STATUS: Primary | ICD-10-CM

## 2018-02-13 DIAGNOSIS — E11.9 TYPE 2 DIABETES MELLITUS WITHOUT COMPLICATION, UNSPECIFIED LONG TERM INSULIN USE STATUS: ICD-10-CM

## 2018-03-16 ENCOUNTER — APPOINTMENT (OUTPATIENT)
Dept: LAB | Facility: HOSPITAL | Age: 58
End: 2018-03-16
Payer: COMMERCIAL

## 2018-03-16 ENCOUNTER — TRANSCRIBE ORDERS (OUTPATIENT)
Dept: ADMINISTRATIVE | Facility: HOSPITAL | Age: 58
End: 2018-03-16

## 2018-03-16 DIAGNOSIS — R79.89 ELEVATED TSH: Primary | ICD-10-CM

## 2018-03-16 DIAGNOSIS — E66.9 OBESITY: ICD-10-CM

## 2018-03-16 DIAGNOSIS — I10 ESSENTIAL (PRIMARY) HYPERTENSION: ICD-10-CM

## 2018-03-16 DIAGNOSIS — E78.5 HYPERLIPIDEMIA: ICD-10-CM

## 2018-03-16 DIAGNOSIS — F41.9 ANXIETY DISORDER: ICD-10-CM

## 2018-03-16 DIAGNOSIS — I25.10 ATHEROSCLEROTIC HEART DISEASE OF NATIVE CORONARY ARTERY WITHOUT ANGINA PECTORIS: ICD-10-CM

## 2018-03-16 DIAGNOSIS — E11.9 TYPE 2 DIABETES MELLITUS WITHOUT COMPLICATIONS (HCC): ICD-10-CM

## 2018-03-16 LAB
ALBUMIN SERPL BCP-MCNC: 3.8 G/DL (ref 3.5–5)
ALP SERPL-CCNC: 99 U/L (ref 46–116)
ALT SERPL W P-5'-P-CCNC: 32 U/L (ref 12–78)
ANION GAP SERPL CALCULATED.3IONS-SCNC: 13 MMOL/L (ref 4–13)
AST SERPL W P-5'-P-CCNC: 16 U/L (ref 5–45)
BILIRUB SERPL-MCNC: 0.3 MG/DL (ref 0.2–1)
BUN SERPL-MCNC: 12 MG/DL (ref 5–25)
CALCIUM SERPL-MCNC: 9.1 MG/DL (ref 8.3–10.1)
CHLORIDE SERPL-SCNC: 102 MMOL/L (ref 100–108)
CHOLEST SERPL-MCNC: 154 MG/DL (ref 50–200)
CO2 SERPL-SCNC: 23 MMOL/L (ref 21–32)
CREAT SERPL-MCNC: 0.81 MG/DL (ref 0.6–1.3)
ERYTHROCYTE [DISTWIDTH] IN BLOOD BY AUTOMATED COUNT: 15.5 % (ref 11.6–15.1)
EST. AVERAGE GLUCOSE BLD GHB EST-MCNC: 151 MG/DL
GFR SERPL CREATININE-BSD FRML MDRD: 81 ML/MIN/1.73SQ M
GLUCOSE P FAST SERPL-MCNC: 122 MG/DL (ref 65–99)
HBA1C MFR BLD: 6.9 % (ref 4.2–6.3)
HCT VFR BLD AUTO: 43.1 % (ref 34.8–46.1)
HDLC SERPL-MCNC: 46 MG/DL (ref 40–60)
HGB BLD-MCNC: 14.2 G/DL (ref 11.5–15.4)
LDLC SERPL CALC-MCNC: 83 MG/DL (ref 0–100)
MCH RBC QN AUTO: 29.3 PG (ref 26.8–34.3)
MCHC RBC AUTO-ENTMCNC: 32.9 G/DL (ref 31.4–37.4)
MCV RBC AUTO: 89 FL (ref 82–98)
PLATELET # BLD AUTO: 285 THOUSANDS/UL (ref 149–390)
PMV BLD AUTO: 10.1 FL (ref 8.9–12.7)
POTASSIUM SERPL-SCNC: 4.4 MMOL/L (ref 3.5–5.3)
PROT SERPL-MCNC: 7.3 G/DL (ref 6.4–8.2)
RBC # BLD AUTO: 4.84 MILLION/UL (ref 3.81–5.12)
SODIUM SERPL-SCNC: 138 MMOL/L (ref 136–145)
TRIGL SERPL-MCNC: 125 MG/DL
TSH SERPL DL<=0.05 MIU/L-ACNC: 4.74 UIU/ML (ref 0.36–3.74)
WBC # BLD AUTO: 13.61 THOUSAND/UL (ref 4.31–10.16)

## 2018-03-16 PROCEDURE — 85027 COMPLETE CBC AUTOMATED: CPT

## 2018-03-16 PROCEDURE — 36415 COLL VENOUS BLD VENIPUNCTURE: CPT

## 2018-03-16 PROCEDURE — 80053 COMPREHEN METABOLIC PANEL: CPT

## 2018-03-16 PROCEDURE — 80061 LIPID PANEL: CPT

## 2018-03-16 PROCEDURE — 83036 HEMOGLOBIN GLYCOSYLATED A1C: CPT

## 2018-03-16 PROCEDURE — 84443 ASSAY THYROID STIM HORMONE: CPT

## 2018-03-19 ENCOUNTER — OFFICE VISIT (OUTPATIENT)
Dept: INTERNAL MEDICINE CLINIC | Facility: CLINIC | Age: 58
End: 2018-03-19
Payer: COMMERCIAL

## 2018-03-19 VITALS
HEART RATE: 84 BPM | HEIGHT: 64 IN | RESPIRATION RATE: 20 BRPM | SYSTOLIC BLOOD PRESSURE: 160 MMHG | BODY MASS INDEX: 37.9 KG/M2 | TEMPERATURE: 98.2 F | WEIGHT: 222 LBS | OXYGEN SATURATION: 98 % | DIASTOLIC BLOOD PRESSURE: 80 MMHG

## 2018-03-19 DIAGNOSIS — D72.829 LEUKOCYTOSIS, UNSPECIFIED TYPE: ICD-10-CM

## 2018-03-19 DIAGNOSIS — R53.81 MALAISE AND FATIGUE: ICD-10-CM

## 2018-03-19 DIAGNOSIS — E78.5 DYSLIPIDEMIA: ICD-10-CM

## 2018-03-19 DIAGNOSIS — R53.83 MALAISE AND FATIGUE: ICD-10-CM

## 2018-03-19 DIAGNOSIS — I25.10 ATHEROSCLEROSIS OF NATIVE CORONARY ARTERY OF NATIVE HEART WITHOUT ANGINA PECTORIS: ICD-10-CM

## 2018-03-19 DIAGNOSIS — I10 ESSENTIAL HYPERTENSION: ICD-10-CM

## 2018-03-19 DIAGNOSIS — Z12.39 SCREENING FOR BREAST CANCER: ICD-10-CM

## 2018-03-19 DIAGNOSIS — Z01.419 CERVICAL SMEAR, AS PART OF ROUTINE GYNECOLOGICAL EXAMINATION: ICD-10-CM

## 2018-03-19 DIAGNOSIS — E11.00 TYPE 2 DIABETES MELLITUS WITH HYPEROSMOLARITY WITHOUT COMA, WITHOUT LONG-TERM CURRENT USE OF INSULIN (HCC): Primary | ICD-10-CM

## 2018-03-19 PROBLEM — E11.9 TYPE 2 DIABETES MELLITUS (HCC): Status: ACTIVE | Noted: 2017-02-28

## 2018-03-19 PROCEDURE — 99214 OFFICE O/P EST MOD 30 MIN: CPT | Performed by: NURSE PRACTITIONER

## 2018-03-19 RX ORDER — GLIPIZIDE 5 MG/1
5 TABLET ORAL DAILY
Qty: 30 TABLET | Refills: 3 | Status: SHIPPED | OUTPATIENT
Start: 2018-03-19 | End: 2018-07-05 | Stop reason: SDUPTHER

## 2018-03-19 RX ORDER — FUROSEMIDE 40 MG/1
1 TABLET ORAL DAILY PRN
COMMUNITY
Start: 2015-09-03 | End: 2019-05-02

## 2018-03-19 RX ORDER — LOSARTAN POTASSIUM 50 MG/1
50 TABLET ORAL DAILY
Qty: 30 TABLET | Refills: 3 | Status: SHIPPED | OUTPATIENT
Start: 2018-03-19 | End: 2018-06-05 | Stop reason: SDUPTHER

## 2018-03-19 RX ORDER — METHOCARBAMOL 500 MG/1
TABLET, FILM COATED ORAL
COMMUNITY
End: 2018-03-19

## 2018-03-19 NOTE — PROGRESS NOTES
Assessment/Plan: Patients A1C was 6 8 stable and she is taking Metformin, Januvia, and Glipizide  She is following up with Cardiology in April and is taking Losartan, Lopressor, and ASA daily  She has consistently been having high white counts with the last one being 13  61 will refer to Hematology for a further workup  Her TSH was slightly off at 4 739 and will recheck this in one month  This could be related to her fatigue  Will check a Vitamin D level as well  She was given a referral to GYN for a routine cervical exam   She was given a script for a mammogram to have done coming up in May  She is up to date on FIT testing  She did have the Flu vaccine this season  She is up to date on her Adacel  She did have the Pneumonia vaccine back in 2014  Will order microablumin of the urine  Will follow up in 3 months or sooner if need be  No problem-specific Assessment & Plan notes found for this encounter           Problem List Items Addressed This Visit     Atherosclerotic heart disease of native coronary artery without angina pectoris    Relevant Orders    Comprehensive metabolic panel    CBC and differential    Dyslipidemia    Relevant Orders    Comprehensive metabolic panel    CBC and differential    TSH, 3rd generation with T4 reflex    Lipid panel    Hypertension    Relevant Medications    furosemide (LASIX) 40 mg tablet    Other Relevant Orders    Comprehensive metabolic panel    CBC and differential    Type 2 diabetes mellitus (HCC) - Primary    Relevant Orders    Comprehensive metabolic panel    CBC and differential    TSH, 3rd generation with T4 reflex    HEMOGLOBIN A1C W/ EAG ESTIMATION    Microalbumin / creatinine urine ratio      Other Visit Diagnoses     Malaise and fatigue        Relevant Orders    Vitamin D 25 hydroxy    Screening for breast cancer        Relevant Orders    Mammo screening bilateral w 3d & cad    Cervical smear, as part of routine gynecological examination        Relevant Orders    Ambulatory referral to Obstetrics / Gynecology    Leukocytosis, unspecified type        Relevant Orders    Ambulatory referral to Hematology / Oncology            Subjective:      Patient ID: Ronnie Flowers is a 62 y o  female  Kiak Gutierrez is here today for a follow up  She states she is doing well today but is extremely tired and feels like she can not get up off the couch  She did have her blood work done for today's visit  She states she is checking her sugars at home and they have been running in the 120s to 150s  She is taking her Metformin, Januvia, or Glipizide  She denies any chest pain, SOB, or palpitations  She denies any depression and does take Xanax on occasion for anxiety  She is due for a mammogram and has not followed up with a GYN and would like a referral today  She states she is following up with Dr Pavithra Rivera regarding her CAD and HTN  She is very concerned about her brother and his drinking then anything else  She states she is constantly watching and checking on him non stop and feels that partially can be related to her being so run down  She is eating and drinking without any issues and offers no complaints of diarrhea or constipation  She offers no other complaints today  The following portions of the patient's history were reviewed and updated as appropriate:   She  has a past medical history of Cardiac disease; Chronic neck pain; Diabetes mellitus ([address] ); Hyperlipidemia; Hypertension; Left bundle branch block; Mild depression ([address] ); and Tonsillar hypertrophy    She   Patient Active Problem List    Diagnosis Date Noted    Type 2 diabetes mellitus ([facility] [address] ) 02/28/2017    Obesity 08/09/2013    Anxiety 06/03/2013    Atherosclerotic heart disease of native coronary artery without angina pectoris 06/03/2013    Dyslipidemia 06/03/2013    Hypertension 05/13/2013     She  has a past surgical history that includes Cardiac surgery and Coronary angioplasty with stent (05/28/2013)  Her family history includes Nicollet's disease in her brother; Alcohol abuse in her brother; Arthritis in her father and mother; Bipolar disorder in her brother; Breast cancer in her cousin and maternal aunt; Cardiomyopathy in her mother; Cerebral aneurysm in her brother; Colon cancer (age of onset: 66) in her mother; Coronary artery disease in her father and mother; Diabetes type II in her father and maternal uncle; Fibrocystic breast disease in her mother; Gout in her father; Hyperlipidemia in her father; Hypertension in her mother and sister; Hypothyroidism in her sister; Kidney disease in her father; Lung cancer in her maternal uncle; Other in her brother and sister; Peripheral vascular disease in her maternal aunt; Prostate cancer in her father; Rheum arthritis in her sister; Stroke in her father; Thrombocytopenia in her daughter  She  reports that she has been smoking Cigarettes  She has a 10 00 pack-year smoking history  She has never used smokeless tobacco  She reports that she does not drink alcohol or use drugs    Current Outpatient Prescriptions   Medication Sig Dispense Refill    ALPRAZolam (XANAX) 0 25 mg tablet Take 0 25 mg by mouth daily at bedtime as needed for anxiety      aspirin 325 mg tablet Take 325 mg by mouth daily      Cholecalciferol (VITAMIN D3 PO) Take 2,000 Units by mouth 2 (two) times a day      cyanocobalamin 2000 MCG tablet Take 2,000 mcg by mouth daily      furosemide (LASIX) 40 mg tablet Take 1 tablet by mouth daily      glipiZIDE (GLUCOTROL) 5 mg tablet Take 5 mg by mouth daily      ibuprofen (MOTRIN) 600 mg tablet Take 1 tablet by mouth every 6 (six) hours as needed for mild pain for up to 3 days 30 tablet 0    Lancets (ACCU-CHEK SOFT TOUCH) lancets by Other route 2 (two) times a day 100 each 3    losartan (COZAAR) 50 mg tablet Take 50 mg by mouth daily      metFORMIN (GLUCOPHAGE) 500 mg tablet Take 500 mg by mouth 2 (two) times a day with meals      metoprolol tartrate (LOPRESSOR) 25 mg tablet Take 25 mg by mouth 2 (two) times a day      Multiple Vitamin-Folic Acid TABS Take by mouth      nitroglycerin (NITROSTAT) 0 4 mg SL tablet Place 0 4 mg under the tongue every 5 (five) minutes as needed for chest pain      simvastatin (ZOCOR) 40 mg tablet Take 40 mg by mouth daily at bedtime      sitaGLIPtin (JANUVIA) 100 mg tablet Take 100 mg by mouth daily       No current facility-administered medications for this visit  Current Outpatient Prescriptions on File Prior to Visit   Medication Sig    ALPRAZolam (XANAX) 0 25 mg tablet Take 0 25 mg by mouth daily at bedtime as needed for anxiety    aspirin 325 mg tablet Take 325 mg by mouth daily    Cholecalciferol (VITAMIN D3 PO) Take 2,000 Units by mouth 2 (two) times a day    cyanocobalamin 2000 MCG tablet Take 2,000 mcg by mouth daily    glipiZIDE (GLUCOTROL) 5 mg tablet Take 5 mg by mouth daily    ibuprofen (MOTRIN) 600 mg tablet Take 1 tablet by mouth every 6 (six) hours as needed for mild pain for up to 3 days    Lancets (ACCU-CHEK SOFT TOUCH) lancets by Other route 2 (two) times a day    losartan (COZAAR) 50 mg tablet Take 50 mg by mouth daily    metFORMIN (GLUCOPHAGE) 500 mg tablet Take 500 mg by mouth 2 (two) times a day with meals    metoprolol tartrate (LOPRESSOR) 25 mg tablet Take 25 mg by mouth 2 (two) times a day    nitroglycerin (NITROSTAT) 0 4 mg SL tablet Place 0 4 mg under the tongue every 5 (five) minutes as needed for chest pain    simvastatin (ZOCOR) 40 mg tablet Take 40 mg by mouth daily at bedtime    sitaGLIPtin (JANUVIA) 100 mg tablet Take 100 mg by mouth daily     No current facility-administered medications on file prior to visit  She is allergic to hctz [hydrochlorothiazide]; wellbutrin [bupropion]; and ace inhibitors       Review of Systems   Constitutional: Positive for fatigue  HENT: Negative  Eyes: Negative  Respiratory: Negative  Cardiovascular: Negative  Gastrointestinal: Negative  Endocrine: Negative  Genitourinary: Negative  Musculoskeletal: Negative  Skin: Negative  Allergic/Immunologic: Negative  Neurological: Negative  Hematological: Negative  Psychiatric/Behavioral: Negative  Below is the patient's most recent value for Albumin, ALT, AST, BUN, Calcium, Chloride, Cholesterol, CO2, Creatinine, GFR, Glucose, HDL, Hematocrit, Hemoglobin, Hemoglobin A1C, LDL, Magnesium, Phosphorus, Platelets, Potassium, PSA, Sodium, Triglycerides, and WBC  Lab Results   Component Value Date    ALT 32 03/16/2018    AST 16 03/16/2018    BUN 12 03/16/2018    CALCIUM 9 1 03/16/2018     03/16/2018    CHOL 154 03/16/2018    CO2 23 03/16/2018    CREATININE 0 81 03/16/2018    HDL 46 03/16/2018    HCT 43 1 03/16/2018    HGB 14 2 03/16/2018    HGBA1C 6 9 (H) 03/16/2018     03/16/2018    K 4 4 03/16/2018     03/16/2018    TRIG 125 03/16/2018    WBC 13 61 (H) 03/16/2018     Note: for a comprehensive list of the patient's lab results, access the Results Review activity  Objective:      /80 (BP Location: Right arm, Patient Position: Sitting, Cuff Size: Standard)   Pulse 84   Temp 98 2 °F (36 8 °C) (Oral)   Resp 20   Ht 5' 3 5" (1 613 m)   Wt 101 kg (222 lb)   SpO2 98%   BMI 38 71 kg/m²          Physical Exam   Constitutional: She is oriented to person, place, and time  She appears well-developed and well-nourished  HENT:   Head: Normocephalic and atraumatic  Right Ear: External ear normal    Left Ear: External ear normal    Nose: Nose normal    Mouth/Throat: Oropharynx is clear and moist    Eyes: Conjunctivae and EOM are normal  Pupils are equal, round, and reactive to light  Neck: Normal range of motion  Neck supple  Cardiovascular: Normal rate, regular rhythm, normal heart sounds and intact distal pulses  Pulmonary/Chest: Effort normal and breath sounds normal    Abdominal: Soft   Bowel sounds are normal  Musculoskeletal: Normal range of motion  Neurological: She is alert and oriented to person, place, and time  She has normal reflexes  Skin: Skin is warm and dry  Psychiatric: She has a normal mood and affect  Her behavior is normal  Judgment and thought content normal    Vitals reviewed

## 2018-03-23 DIAGNOSIS — E66.9 OBESITY: ICD-10-CM

## 2018-03-23 DIAGNOSIS — E78.5 HYPERLIPIDEMIA: ICD-10-CM

## 2018-03-23 DIAGNOSIS — F41.9 ANXIETY DISORDER: ICD-10-CM

## 2018-03-23 DIAGNOSIS — I10 ESSENTIAL (PRIMARY) HYPERTENSION: ICD-10-CM

## 2018-03-23 DIAGNOSIS — E11.9 TYPE 2 DIABETES MELLITUS WITHOUT COMPLICATIONS (HCC): ICD-10-CM

## 2018-03-23 DIAGNOSIS — I25.10 ATHEROSCLEROTIC HEART DISEASE OF NATIVE CORONARY ARTERY WITHOUT ANGINA PECTORIS: ICD-10-CM

## 2018-04-02 DIAGNOSIS — E78.49 OTHER HYPERLIPIDEMIA: Primary | ICD-10-CM

## 2018-04-02 RX ORDER — SIMVASTATIN 40 MG
TABLET ORAL
Qty: 30 TABLET | Refills: 3 | Status: SHIPPED | OUTPATIENT
Start: 2018-04-02 | End: 2018-06-05 | Stop reason: CLARIF

## 2018-04-24 ENCOUNTER — LAB (OUTPATIENT)
Dept: LAB | Facility: HOSPITAL | Age: 58
End: 2018-04-24
Payer: COMMERCIAL

## 2018-04-24 ENCOUNTER — TELEPHONE (OUTPATIENT)
Dept: HEMATOLOGY ONCOLOGY | Facility: HOSPITAL | Age: 58
End: 2018-04-24

## 2018-04-24 ENCOUNTER — OFFICE VISIT (OUTPATIENT)
Dept: HEMATOLOGY ONCOLOGY | Facility: HOSPITAL | Age: 58
End: 2018-04-24
Payer: COMMERCIAL

## 2018-04-24 VITALS
HEIGHT: 63 IN | DIASTOLIC BLOOD PRESSURE: 84 MMHG | RESPIRATION RATE: 18 BRPM | HEART RATE: 85 BPM | OXYGEN SATURATION: 96 % | TEMPERATURE: 99 F | BODY MASS INDEX: 39.39 KG/M2 | WEIGHT: 222.3 LBS | SYSTOLIC BLOOD PRESSURE: 138 MMHG

## 2018-04-24 DIAGNOSIS — E11.00 TYPE 2 DIABETES MELLITUS WITH HYPEROSMOLARITY WITHOUT COMA, WITHOUT LONG-TERM CURRENT USE OF INSULIN (HCC): ICD-10-CM

## 2018-04-24 DIAGNOSIS — R53.83 MALAISE AND FATIGUE: ICD-10-CM

## 2018-04-24 DIAGNOSIS — I10 ESSENTIAL HYPERTENSION: ICD-10-CM

## 2018-04-24 DIAGNOSIS — I25.10 ATHEROSCLEROSIS OF NATIVE CORONARY ARTERY OF NATIVE HEART WITHOUT ANGINA PECTORIS: ICD-10-CM

## 2018-04-24 DIAGNOSIS — R53.81 MALAISE AND FATIGUE: ICD-10-CM

## 2018-04-24 DIAGNOSIS — D72.829 LEUKOCYTOSIS, UNSPECIFIED TYPE: ICD-10-CM

## 2018-04-24 DIAGNOSIS — E78.5 DYSLIPIDEMIA: ICD-10-CM

## 2018-04-24 LAB
25(OH)D3 SERPL-MCNC: 65.8 NG/ML (ref 30–100)
ALBUMIN SERPL BCP-MCNC: 3.9 G/DL (ref 3.5–5)
ALP SERPL-CCNC: 96 U/L (ref 46–116)
ALT SERPL W P-5'-P-CCNC: 27 U/L (ref 12–78)
ANION GAP SERPL CALCULATED.3IONS-SCNC: 9 MMOL/L (ref 4–13)
AST SERPL W P-5'-P-CCNC: 12 U/L (ref 5–45)
BASOPHILS # BLD AUTO: 0.07 THOUSANDS/ΜL (ref 0–0.1)
BASOPHILS NFR BLD AUTO: 1 % (ref 0–1)
BILIRUB SERPL-MCNC: 0.2 MG/DL (ref 0.2–1)
BUN SERPL-MCNC: 11 MG/DL (ref 5–25)
CALCIUM SERPL-MCNC: 9.2 MG/DL (ref 8.3–10.1)
CHLORIDE SERPL-SCNC: 103 MMOL/L (ref 100–108)
CO2 SERPL-SCNC: 28 MMOL/L (ref 21–32)
CREAT SERPL-MCNC: 0.73 MG/DL (ref 0.6–1.3)
EOSINOPHIL # BLD AUTO: 0.22 THOUSAND/ΜL (ref 0–0.61)
EOSINOPHIL NFR BLD AUTO: 2 % (ref 0–6)
ERYTHROCYTE [DISTWIDTH] IN BLOOD BY AUTOMATED COUNT: 15.4 % (ref 11.6–15.1)
EST. AVERAGE GLUCOSE BLD GHB EST-MCNC: 157 MG/DL
GFR SERPL CREATININE-BSD FRML MDRD: 92 ML/MIN/1.73SQ M
GLUCOSE SERPL-MCNC: 119 MG/DL (ref 65–140)
HBA1C MFR BLD: 7.1 % (ref 4.2–6.3)
HCT VFR BLD AUTO: 41.9 % (ref 34.8–46.1)
HGB BLD-MCNC: 13.8 G/DL (ref 11.5–15.4)
LYMPHOCYTES # BLD AUTO: 4.05 THOUSANDS/ΜL (ref 0.6–4.47)
LYMPHOCYTES NFR BLD AUTO: 30 % (ref 14–44)
MCH RBC QN AUTO: 29.1 PG (ref 26.8–34.3)
MCHC RBC AUTO-ENTMCNC: 32.9 G/DL (ref 31.4–37.4)
MCV RBC AUTO: 88 FL (ref 82–98)
MONOCYTES # BLD AUTO: 0.98 THOUSAND/ΜL (ref 0.17–1.22)
MONOCYTES NFR BLD AUTO: 7 % (ref 4–12)
NEUTROPHILS # BLD AUTO: 8.05 THOUSANDS/ΜL (ref 1.85–7.62)
NEUTS SEG NFR BLD AUTO: 60 % (ref 43–75)
PLATELET # BLD AUTO: 278 THOUSANDS/UL (ref 149–390)
PMV BLD AUTO: 9.9 FL (ref 8.9–12.7)
POTASSIUM SERPL-SCNC: 4 MMOL/L (ref 3.5–5.3)
PROT SERPL-MCNC: 7.4 G/DL (ref 6.4–8.2)
RBC # BLD AUTO: 4.74 MILLION/UL (ref 3.81–5.12)
SODIUM SERPL-SCNC: 140 MMOL/L (ref 136–145)
TSH SERPL DL<=0.05 MIU/L-ACNC: 1.89 UIU/ML (ref 0.36–3.74)
WBC # BLD AUTO: 13.37 THOUSAND/UL (ref 4.31–10.16)

## 2018-04-24 PROCEDURE — 85025 COMPLETE CBC W/AUTO DIFF WBC: CPT

## 2018-04-24 PROCEDURE — 36415 COLL VENOUS BLD VENIPUNCTURE: CPT

## 2018-04-24 PROCEDURE — 84443 ASSAY THYROID STIM HORMONE: CPT

## 2018-04-24 PROCEDURE — 80053 COMPREHEN METABOLIC PANEL: CPT

## 2018-04-24 PROCEDURE — 82306 VITAMIN D 25 HYDROXY: CPT

## 2018-04-24 PROCEDURE — 83036 HEMOGLOBIN GLYCOSYLATED A1C: CPT

## 2018-04-24 PROCEDURE — 99205 OFFICE O/P NEW HI 60 MIN: CPT | Performed by: INTERNAL MEDICINE

## 2018-04-24 NOTE — PROGRESS NOTES
Candace Sena  1960  [address] [facility]  [address] [address]    Chief Complaint   Patient presents with   Josie Corey Consult      March 2018 patient had routine blood work done showing white count of 13 8  Normal hemoglobin and platelets  No differential was performed  No history exists  History of Present Illness:  -Jordin Vasquez C*:  -Previous Therapy (if not listed in Oncology History):  -Current Therapy (if not listed in Oncology History):  -Disease Status:  -Interval History:  -Tumor Markers:    Review of Systems   Constitutional: Negative for appetite change, diaphoresis, fatigue and fever  HENT: Negative for sinus pain  Eyes: Negative for discharge  Respiratory: Negative for cough and shortness of breath  Cardiovascular: Negative for chest pain  Gastrointestinal: Negative for abdominal pain, constipation and diarrhea  Endocrine: Negative for cold intolerance  Genitourinary: Negative for difficulty urinating and hematuria  Musculoskeletal: Negative for joint swelling  Skin: Negative for rash  Allergic/Immunologic: Negative for environmental allergies  Neurological: Negative for dizziness and headaches  Hematological: Negative for adenopathy  Psychiatric/Behavioral: Negative for agitation         Patient Active Problem List   Diagnosis    Anxiety    Atherosclerotic heart disease of native coronary artery without angina pectoris    Dyslipidemia    Hypertension    Obesity    Type 2 diabetes mellitus (HCC)    Leukocytosis     Past Medical History:   Diagnosis Date    Cardiac disease     MI May 25, 2013    Chronic neck pain     Last Assessed:11/28/16    Diabetes mellitus ([facility] [address] )     Hyperlipidemia     Hypertension     Left bundle branch block     Last Assessed:12/5/13    Mild depression (HCC)     Last Assessed:11/28/16    Tonsillar hypertrophy     Last Assessed:8/27/13     Past Surgical History:   Procedure Laterality Date    CARDIAC SURGERY      1 stent placed    CORONARY ANGIOPLASTY WITH STENT PLACEMENT  05/28/2013    per [employer]: Cath stent 1 type drug-eluting, mild RCA     Family History   Problem Relation Age of Onset    Arthritis Mother     Colon cancer Mother 66    Coronary artery disease Mother     Fibrocystic breast disease Mother     Hypertension Mother    [de-identified] Cardiomyopathy Mother      Ischemic Dilated Cardiomyopathy    Arthritis Father     Kidney disease Father      Chronic (NKF classification)    Coronary artery disease Father     Gout Father     Prostate cancer Father     Hyperlipidemia Father      Pure Hypercholesterolemia    Stroke Father      Stroke Syndrome    Diabetes type II Father     Hypertension Sister     Hypothyroidism Sister     Other Sister      IVP- Solitary Kidney Calculus    Rheum arthritis Sister     Alcohol abuse Brother     Bipolar disorder Brother      NOS    Other Brother      IVP- Solitary Kidney Calculus    Francois's disease Brother     Cerebral aneurysm Brother      Ruptured    Breast cancer Maternal Aunt     Peripheral vascular disease Maternal Aunt     Breast cancer Cousin      2 maternal cousins    Lung cancer Maternal Uncle     Diabetes type II Maternal Uncle     Thrombocytopenia Daughter      Idiopathic Thrombocytopenia [place] at 10 yo     Social History     Social History    Marital status:      Spouse name: N/A    Number of children: 2    Years of education: N/A     Occupational History    admin       [employer]     Social History Main Topics    Smoking status: Current Every Day Smoker     Packs/day: 0 50     Years: 20 00     Types: Cigarettes    Smokeless tobacco: Never Used    Alcohol use No    Drug use: No      Comment: occassionally     Sexual activity: Not on file     Other Topics Concern    Not on file     Social History Narrative    No narrative on file       Current Outpatient Prescriptions:     ALPRAZolam (XANAX) 0 25 mg tablet, Take 0 25 mg by mouth daily at bedtime as needed for anxiety, Disp: , Rfl:     aspirin 325 mg tablet, Take 325 mg by mouth daily, Disp: , Rfl:     Cholecalciferol (VITAMIN D3 PO), Take 2,000 Units by mouth 2 (two) times a day, Disp: , Rfl:     cyanocobalamin 2000 MCG tablet, Take 2,000 mcg by mouth daily, Disp: , Rfl:     furosemide (LASIX) 40 mg tablet, Take 1 tablet by mouth daily as needed  , Disp: , Rfl:     glipiZIDE (GLUCOTROL) 5 mg tablet, Take 1 tablet (5 mg total) by mouth daily, Disp: 30 tablet, Rfl: 3    Lancets (ACCU-CHEK SOFT TOUCH) lancets, by Other route 2 (two) times a day, Disp: 100 each, Rfl: 3    losartan (COZAAR) 50 mg tablet, Take 1 tablet (50 mg total) by mouth daily, Disp: 30 tablet, Rfl: 3    metFORMIN (GLUCOPHAGE) 500 mg tablet, Take one tablet in the am and take 2 tablets in the pm, Disp: 30 tablet, Rfl: 3    metoprolol tartrate (LOPRESSOR) 25 mg tablet, Take 25 mg by mouth 2 (two) times a day, Disp: , Rfl:     Multiple Vitamin-Folic Acid TABS, Take by mouth, Disp: , Rfl:     nitroglycerin (NITROSTAT) 0 4 mg SL tablet, Place 0 4 mg under the tongue every 5 (five) minutes as needed for chest pain, Disp: , Rfl:     simvastatin (ZOCOR) 40 mg tablet, take 1 tablet by mouth once daily, Disp: 30 tablet, Rfl: 3    sitaGLIPtin (JANUVIA) 100 mg tablet, Take 100 mg by mouth daily, Disp: , Rfl:     ibuprofen (MOTRIN) 600 mg tablet, Take 1 tablet by mouth every 6 (six) hours as needed for mild pain for up to 3 days, Disp: 30 tablet, Rfl: 0  Allergies   Allergen Reactions    Hctz [Hydrochlorothiazide] Shortness Of Breath and Dizziness    Wellbutrin [Bupropion] Shortness Of Breath     Category:  Adverse Reaction;     Ace Inhibitors Cough     Vitals:    04/24/18 1425   BP: 138/84   Pulse: 85   Resp: 18   Temp: 99 °F (37 2 °C)   SpO2: 96%       Physical Exam   Constitutional: She is oriented to person, place, and time  She appears well-developed  HENT:   Head: Normocephalic  Eyes: Pupils are equal, round, and reactive to light  Neck: Neck supple  Cardiovascular: Normal rate  No murmur heard  Pulmonary/Chest: No respiratory distress  She has no wheezes  She has no rales  Abdominal: Soft  She exhibits no distension  There is no tenderness  There is no rebound  Musculoskeletal: She exhibits no edema  Lymphadenopathy:     She has no cervical adenopathy  Neurological: She is alert and oriented to person, place, and time  She displays normal reflexes  Skin: Skin is warm  No rash noted  Psychiatric: She has a normal mood and affect  Thought content normal          Labs:  CBC, Coags, BMP, Mg, Phos      Imaging  No results found  I reviewed the above laboratory and imaging data  Discussion/Summary:   In summary, this is a 26-year-old female history of leukocytosis  This dates back at least 1 year with white counts stable in the 10 0-15 0 range  Hemoglobin and platelets have been normal throughout the interval   No differential was accomplished during that time  Differential diagnosis is quite broad  I have elected to proceed with CBC and differential   This will allow proper second-line testing for predominant lymphoid or myeloid processes  We reviewed that the differential diagnosis could include malignancy such as CLL or CML, or be as benign as statistical artifact ( I e  Normal individual falling outside the population reference range; nonpathologic )  The patient does have family history of autoimmunity with her daughter having a history of ITP and her sister having rheumatoid arthritis  A brother has Essex's disease  Additionally, the patient gives a history of significant reaction to  Insect bites in 2 occasions in the past   It is not clear that this is connected to her leukocytosis  She is up-to-date with mammography    Gyn exam is currently pending, her last "a few years ago "    Additionally, she had recent Hemoccult testing which she tells me was negative  We reviewed the potential role of screening chest CT given her smoking history and age  This is deferred for the moment  Again, I reviewed the above with the patient and her sister  They voiced understanding and agreement

## 2018-04-24 NOTE — LETTER
April 24, 2018     [address]  [address]    Patient: Greta Hylton   YOB: 1960   Date of Visit: 4/24/2018       Dear Dr Gardner Pae: Thank you for referring Greta Hylton to me for evaluation  Below are my notes for this consultation  If you have questions, please do not hesitate to call me  I look forward to following your patient along with you  Sincerely,        Srikanth Rutledge DO        CC: No Recipients  Srikanth Rutledge DO  4/24/2018  2:38 PM  Sign at close encounter    Greta Hylton  1960  [facility]  [address] [place] [id number]    Chief Complaint   Patient presents with    Consult            No history exists  History of Present Illness:  -Sue Vasquez C*:  -Previous Therapy (if not listed in Oncology History):  -Current Therapy (if not listed in Oncology History):  -Disease Status:  -Interval History:  -Tumor Markers:    Review of Systems   Constitutional: Negative for appetite change, diaphoresis, fatigue and fever  HENT: Negative for sinus pain  Eyes: Negative for discharge  Respiratory: Negative for cough and shortness of breath  Cardiovascular: Negative for chest pain  Gastrointestinal: Negative for abdominal pain, constipation and diarrhea  Endocrine: Negative for cold intolerance  Genitourinary: Negative for difficulty urinating and hematuria  Musculoskeletal: Negative for joint swelling  Skin: Negative for rash  Allergic/Immunologic: Negative for environmental allergies  Neurological: Negative for dizziness and headaches  Hematological: Negative for adenopathy  Psychiatric/Behavioral: Negative for agitation         Patient Active Problem List   Diagnosis    Anxiety    Atherosclerotic heart disease of native coronary artery without angina pectoris    Dyslipidemia    Hypertension    Obesity    Type 2 diabetes mellitus ([facility] )     Past Medical History:   Diagnosis Date    Cardiac disease     MI May 25, 2013    Chronic neck pain     Last Assessed:11/28/16    Diabetes mellitus ([facility] )     Hyperlipidemia     Hypertension     Left bundle branch block     Last Assessed:12/5/13    Mild depression ([facility] )     Last Assessed:11/28/16    Tonsillar hypertrophy     Last Assessed:8/27/13     Past Surgical History:   Procedure Laterality Date    CARDIAC SURGERY      1 stent placed    CORONARY ANGIOPLASTY WITH STENT PLACEMENT  05/28/2013    per [employer]: Cath stent 1 type drug-eluting, mild RCA     Family History   Problem Relation Age of Onset    Arthritis Mother     Colon cancer Mother 66    Coronary artery disease Mother     Fibrocystic breast disease Mother     Hypertension Mother    Vena Jhonny Cardiomyopathy Mother      Ischemic Dilated Cardiomyopathy    Arthritis Father     Kidney disease Father      Chronic (NKF classification)    Coronary artery disease Father     Gout Father     Prostate cancer Father     Hyperlipidemia Father      Pure Hypercholesterolemia    Stroke Father      Stroke Syndrome    Diabetes type II Father     Hypertension Sister     Hypothyroidism Sister     Other Sister      IVP- Solitary Kidney Calculus    Rheum arthritis Sister     Alcohol abuse Brother     Bipolar disorder Brother      NOS    Other Brother      IVP- Solitary Kidney Calculus    [place]ango's disease Brother     Cerebral aneurysm Brother      Ruptured    Breast cancer Maternal Aunt     Peripheral vascular disease Maternal Aunt     Breast cancer Cousin      2 maternal cousins    Lung cancer Maternal Uncle     Diabetes type II Maternal Uncle     Thrombocytopenia Daughter      Idiopathic Thrombocytopenia Purpura at 10 yo     Social History     Social History    Marital status:      Spouse name: N/A    Number of children: 2    Years of education: N/A     Occupational History    admin       [employer]     Social History Main Topics    Smoking status: Current Every Day Smoker     Packs/day: 0 50     Years: 20 00     Types: Cigarettes    Smokeless tobacco: Never Used    Alcohol use No    Drug use: No      Comment: occassionally     Sexual activity: Not on file     Other Topics Concern    Not on file     Social History Narrative    No narrative on file       Current Outpatient Prescriptions:     ALPRAZolam (XANAX) 0 25 mg tablet, Take 0 25 mg by mouth daily at bedtime as needed for anxiety, Disp: , Rfl:     aspirin 325 mg tablet, Take 325 mg by mouth daily, Disp: , Rfl:     Cholecalciferol (VITAMIN D3 PO), Take 2,000 Units by mouth 2 (two) times a day, Disp: , Rfl:     cyanocobalamin 2000 MCG tablet, Take 2,000 mcg by mouth daily, Disp: , Rfl:     furosemide (LASIX) 40 mg tablet, Take 1 tablet by mouth daily as needed  , Disp: , Rfl:     glipiZIDE (GLUCOTROL) 5 mg tablet, Take 1 tablet (5 mg total) by mouth daily, Disp: 30 tablet, Rfl: 3    Lancets (ACCU-CHEK SOFT TOUCH) lancets, by Other route 2 (two) times a day, Disp: 100 each, Rfl: 3    losartan (COZAAR) 50 mg tablet, Take 1 tablet (50 mg total) by mouth daily, Disp: 30 tablet, Rfl: 3    metFORMIN (GLUCOPHAGE) 500 mg tablet, Take one tablet in the am and take 2 tablets in the pm, Disp: 30 tablet, Rfl: 3    metoprolol tartrate (LOPRESSOR) 25 mg tablet, Take 25 mg by mouth 2 (two) times a day, Disp: , Rfl:     Multiple Vitamin-Folic Acid TABS, Take by mouth, Disp: , Rfl:     nitroglycerin (NITROSTAT) 0 4 mg SL tablet, Place 0 4 mg under the tongue every 5 (five) minutes as needed for chest pain, Disp: , Rfl:     simvastatin (ZOCOR) 40 mg tablet, take 1 tablet by mouth once daily, Disp: 30 tablet, Rfl: 3    sitaGLIPtin (JANUVIA) 100 mg tablet, Take 100 mg by mouth daily, Disp: , Rfl:     ibuprofen (MOTRIN) 600 mg tablet, Take 1 tablet by mouth every 6 (six) hours as needed for mild pain for up to 3 days, Disp: 30 tablet, Rfl: 0  Allergies   Allergen Reactions    Hctz [Hydrochlorothiazide] Shortness Of Breath and Dizziness    Wellbutrin [Bupropion] Shortness Of Breath     Category: Adverse Reaction;     Ace Inhibitors Cough     Vitals:    04/24/18 1425   BP: 138/84   Pulse: 85   Resp: 18   Temp: 99 °F (37 2 °C)   SpO2: 96%       Physical Exam   Constitutional: She is oriented to person, place, and time  She appears well-developed  HENT:   Head: Normocephalic  Eyes: Pupils are equal, round, and reactive to light  Neck: Neck supple  Cardiovascular: Normal rate  No murmur heard  Pulmonary/Chest: No respiratory distress  She has no wheezes  She has no rales  Abdominal: Soft  She exhibits no distension  There is no tenderness  There is no rebound  Musculoskeletal: She exhibits no edema  Lymphadenopathy:     She has no cervical adenopathy  Neurological: She is alert and oriented to person, place, and time  She displays normal reflexes  Skin: Skin is warm  No rash noted  Psychiatric: She has a normal mood and affect  Thought content normal          Labs:  CBC, Coags, BMP, Mg, Phos      Imaging  No results found  I reviewed the above laboratory and imaging data      Discussion/Summary:

## 2018-04-24 NOTE — TELEPHONE ENCOUNTER
Message was left for Dr Rebeca Villatoro [phone] to contact Dr Sabi Gunter to discuss pt and Block

## 2018-04-29 DIAGNOSIS — E11.00 TYPE 2 DIABETES MELLITUS WITH HYPEROSMOLARITY WITHOUT COMA, WITHOUT LONG-TERM CURRENT USE OF INSULIN (HCC): ICD-10-CM

## 2018-05-01 ENCOUNTER — TELEPHONE (OUTPATIENT)
Dept: HEMATOLOGY ONCOLOGY | Facility: CLINIC | Age: 58
End: 2018-05-01

## 2018-05-01 NOTE — TELEPHONE ENCOUNTER
Labs reviewed with Dr Parveen Martinez - all additional testing WNL  No further follow up in our office is needed at the moment    Patient can follow with PCP for regular blood work - she will call with any additional questions or concerns that may come up in the future

## 2018-05-02 NOTE — PROGRESS NOTES
Can you let Ulysses Dagoberto know her blood work did come back her A1C is up at 7 1 her vitamin D was 65 which is normal and her WBC was up at 13 can you clarify what diabetic medications she is taking and when her next appointment is

## 2018-05-31 RX ORDER — BLOOD SUGAR DIAGNOSTIC
STRIP MISCELLANEOUS
Refills: 0 | COMMUNITY
Start: 2018-05-21 | End: 2018-07-09 | Stop reason: SDUPTHER

## 2018-06-05 ENCOUNTER — OFFICE VISIT (OUTPATIENT)
Dept: CARDIOLOGY CLINIC | Facility: HOSPITAL | Age: 58
End: 2018-06-05
Payer: COMMERCIAL

## 2018-06-05 VITALS
DIASTOLIC BLOOD PRESSURE: 82 MMHG | HEART RATE: 100 BPM | SYSTOLIC BLOOD PRESSURE: 145 MMHG | WEIGHT: 223 LBS | HEIGHT: 63 IN | BODY MASS INDEX: 39.51 KG/M2

## 2018-06-05 DIAGNOSIS — I10 BENIGN ESSENTIAL HYPERTENSION: ICD-10-CM

## 2018-06-05 DIAGNOSIS — I25.10 ATHEROSCLEROSIS OF NATIVE CORONARY ARTERY OF NATIVE HEART WITHOUT ANGINA PECTORIS: ICD-10-CM

## 2018-06-05 DIAGNOSIS — E78.5 DYSLIPIDEMIA: ICD-10-CM

## 2018-06-05 DIAGNOSIS — R06.83 SNORING: ICD-10-CM

## 2018-06-05 DIAGNOSIS — I44.7 LEFT BUNDLE BRANCH BLOCK (LBBB): ICD-10-CM

## 2018-06-05 DIAGNOSIS — Z72.0 TOBACCO ABUSE: ICD-10-CM

## 2018-06-05 DIAGNOSIS — I10 ESSENTIAL HYPERTENSION: ICD-10-CM

## 2018-06-05 DIAGNOSIS — Z95.5 PRESENCE OF DRUG-ELUTING STENT IN RIGHT CORONARY ARTERY: ICD-10-CM

## 2018-06-05 PROCEDURE — 99214 OFFICE O/P EST MOD 30 MIN: CPT | Performed by: INTERNAL MEDICINE

## 2018-06-05 PROCEDURE — 93000 ELECTROCARDIOGRAM COMPLETE: CPT | Performed by: INTERNAL MEDICINE

## 2018-06-05 PROCEDURE — 4010F ACE/ARB THERAPY RXD/TAKEN: CPT | Performed by: NURSE PRACTITIONER

## 2018-06-05 RX ORDER — ROSUVASTATIN CALCIUM 40 MG/1
40 TABLET, COATED ORAL DAILY
Qty: 90 TABLET | Refills: 3 | Status: SHIPPED | OUTPATIENT
Start: 2018-06-05 | End: 2019-06-15 | Stop reason: SDUPTHER

## 2018-06-05 RX ORDER — NICOTINE 21 MG/24HR
1 PATCH, TRANSDERMAL 24 HOURS TRANSDERMAL EVERY 24 HOURS
Qty: 28 PATCH | Refills: 0 | Status: SHIPPED | OUTPATIENT
Start: 2018-06-05 | End: 2022-02-01

## 2018-06-05 RX ORDER — LOSARTAN POTASSIUM 100 MG/1
100 TABLET ORAL DAILY
Qty: 90 TABLET | Refills: 3 | Status: SHIPPED | OUTPATIENT
Start: 2018-06-05 | End: 2019-05-23 | Stop reason: SDUPTHER

## 2018-06-05 NOTE — PROGRESS NOTES
Cardiology Follow Up    Heidi Castrejon  1960  [id number]  [address] [id number]    1  Atherosclerosis of native coronary artery of native heart without angina pectoris     2  Presence of drug-eluting stent in right coronary artery     3  Benign essential hypertension     4  Dyslipidemia  POCT ECG    rosuvastatin (CRESTOR) 40 MG tablet    Lipid Panel with Direct LDL reflex   5  Left bundle branch block (LBBB)     6  Tobacco abuse  nicotine (NICODERM CQ) 14 mg/24hr TD 24 hr patch   7  Essential hypertension  losartan (COZAAR) 100 MG tablet   8  Snoring  Home Study       Discussion/Summary:  Ms Mora Sears is a pleasant 26-year-old female who presents to the office today for routine follow-up  She is sedentary but is able to perform modest housework and yard work without any cardiopulmonary symptoms  Her blood pressure is high in the office today and has been during recent office visits with different providers  I will increase her losartan to 100 mg daily  Her most recent lipids were reviewed  Her LDL is high  I have advised switching from simvastatin to rosuvastatin 40 mg daily  I will reassess her lipids in a few months  Smoking cessation was again advised  I have given her prescription for nicotine patch which she will attempt  She does have signs and symptoms of obstructive sleep apnea  I have asked that she undergo a home sleep study for further evaluation  I will see her back in the office in one year  Interval History:   Ms Mora Sears is a pleasant 26-year-old female who presents to the office today for routine follow-up  Since her last visit she has been feeling well  She leads a sedentary lifestyle  She is able to mow her yard with a push mower and do modest housework without any difficulty   She denies exertional shortness of breath or chest pain  She denies symptoms of heart failure including increasing lower sternum edema, paroxysmal nocturnal dyspnea, orthopnea, weight gain, or increasing abdominal girth  She denies lightheadedness, syncope or presyncope  She denies symptoms of palpitations or claudication  She continues to smoke about a half a pack of cigarettes per day  She reports extreme daytime fatigue  Upon further questioning she does snore at night  There have been witnessed apneas      Problem List     Anxiety    Atherosclerotic heart disease of native coronary artery without angina pectoris    Overview Signed 3/19/2018 12:47 PM by ANA Brown     Description: s/p AMERICA ([employer]) mid RCA 05/28/2013         Dyslipidemia    Hypertension    Obesity    Type 2 diabetes mellitus ([facility] )    Leukocytosis        Past Medical History:   Diagnosis Date    Cardiac disease     MI May 25, 2013    Chronic neck pain     Last Assessed:11/28/16    Diabetes mellitus ([facility] )     Hyperlipidemia     Hypertension     Left bundle branch block     Last Assessed:12/5/13    Mild depression ([facility] )     Last Assessed:11/28/16    Tonsillar hypertrophy     Last Assessed:8/27/13     Social History     Social History    Marital status:      Spouse name: N/A    Number of children: 2    Years of education: N/A     Occupational History    admin       [employer]     Social History Main Topics    Smoking status: Current Every Day Smoker     Packs/day: 0 50     Years: 20 00     Types: Cigarettes    Smokeless tobacco: Never Used    Alcohol use No    Drug use: No      Comment: occassionally     Sexual activity: Not on file     Other Topics Concern    Not on file     Social History Narrative    No narrative on file      Family History   Problem Relation Age of Onset    Arthritis Mother     Colon cancer Mother 66    Coronary artery disease Mother     Fibrocystic breast disease Mother     Hypertension Mother     Cardiomyopathy Mother      Ischemic Dilated Cardiomyopathy    Arthritis Father     Kidney disease Father      Chronic (NKF classification)    Coronary artery disease Father     Gout Father     Prostate cancer Father     Hyperlipidemia Father      Pure Hypercholesterolemia    Stroke Father      Stroke Syndrome    Diabetes type II Father     Hypertension Sister     Hypothyroidism Sister     Other Sister      IVP- Solitary Kidney Calculus    Rheum arthritis Sister     Alcohol abuse Brother     Bipolar disorder Brother      NOS    Other Brother      IVP- Solitary Kidney Calculus    [place]'s disease Brother     Cerebral aneurysm Brother      Ruptured    Breast cancer Maternal Aunt     Peripheral vascular disease Maternal Aunt     Breast cancer Cousin      2 maternal cousins    Lung cancer Maternal Uncle     Diabetes type II Maternal Uncle     Thrombocytopenia Daughter      Idiopathic Thrombocytopenia Purpura at 10 yo     Past Surgical History:   Procedure Laterality Date    CARDIAC SURGERY      1 stent placed    CORONARY ANGIOPLASTY WITH STENT PLACEMENT  05/28/2013    per [employer]: Cath stent 1 type drug-eluting, mild RCA       Current Outpatient Prescriptions:     ACCU-Know[employer] TSERING PLUS test strip, TEST twice a day, Disp: , Rfl: 0    ALPRAZolam (XANAX) 0 25 mg tablet, Take 0 25 mg by mouth daily at bedtime as needed for anxiety, Disp: , Rfl:     aspirin 325 mg tablet, Take 325 mg by mouth daily, Disp: , Rfl:     Cholecalciferol (VITAMIN D3 PO), Take 2,000 Units by mouth 2 (two) times a day, Disp: , Rfl:     cyanocobalamin 2000 MCG tablet, Take 2,000 mcg by mouth daily, Disp: , Rfl:     furosemide (LASIX) 40 mg tablet, Take 1 tablet by mouth daily as needed  , Disp: , Rfl:     glipiZIDE (GLUCOTROL) 5 mg tablet, Take 1 tablet (5 mg total) by mouth daily, Disp: 30 tablet, Rfl: 3    ibuprofen (MOTRIN) 600 mg tablet, Take 1 tablet by mouth every 6 (six) hours as needed for mild pain for up to 3 days, Disp: 30 tablet, Rfl: 0    Lancets (ACCU-CHEK SOFT TOUCH) lancets, by Other route 2 (two) times a day, Disp: 100 each, Rfl: 3    losartan (COZAAR) 100 MG tablet, Take 1 tablet (100 mg total) by mouth daily, Disp: 90 tablet, Rfl: 3    metFORMIN (GLUCOPHAGE) 500 mg tablet, TAKE 1 TABLET IN THE AM AND 2 TABS IN THE PM, Disp: 30 tablet, Rfl: 3    metoprolol tartrate (LOPRESSOR) 25 mg tablet, Take 25 mg by mouth 2 (two) times a day, Disp: , Rfl:     Multiple Vitamin-Folic Acid TABS, Take by mouth, Disp: , Rfl:     nitroglycerin (NITROSTAT) 0 4 mg SL tablet, Place 0 4 mg under the tongue every 5 (five) minutes as needed for chest pain, Disp: , Rfl:     sitaGLIPtin (JANUVIA) 100 mg tablet, Take 100 mg by mouth daily, Disp: , Rfl:     nicotine (NICODERM CQ) 14 mg/24hr TD 24 hr patch, Place 1 patch on the skin every 24 hours, Disp: 28 patch, Rfl: 0    rosuvastatin (CRESTOR) 40 MG tablet, Take 1 tablet (40 mg total) by mouth daily, Disp: 90 tablet, Rfl: 3  Allergies   Allergen Reactions    Hctz [Hydrochlorothiazide] Shortness Of Breath and Dizziness    Wellbutrin [Bupropion] Shortness Of Breath     Category:  Adverse Reaction;     Ace Inhibitors Cough       Labs:     Chemistry        Component Value Date/Time     04/24/2018 1518    K 4 0 04/24/2018 1518     04/24/2018 1518    CO2 28 04/24/2018 1518    BUN 11 04/24/2018 1518    CREATININE 0 73 04/24/2018 1518        Component Value Date/Time    CALCIUM 9 2 04/24/2018 1518    ALKPHOS 96 04/24/2018 1518    AST 12 04/24/2018 1518    ALT 27 04/24/2018 1518    BILITOT 0 20 04/24/2018 1518            Lab Results   Component Value Date    CHOL 154 03/16/2018    CHOL 154 07/19/2017    CHOL 132 04/05/2017     Lab Results   Component Value Date    HDL 46 03/16/2018    HDL 40 07/19/2017    HDL 34 (L) 04/05/2017     Lab Results   Component Value Date    LDLCALC 83 03/16/2018    LDLCALC 97 07/19/2017    LDLCALC 64 04/05/2017     Lab Results   Component Value Date    TRIG 125 03/16/2018    TRIG 85 07/19/2017    TRIG 170 (H) 04/05/2017     No components found for: CHOLHDL    Imaging: No results found  ECG:  Normal sinus rhythm, left bundle branch block      Review of Systems   Musculoskeletal: Positive for joint pain  Vitals:    06/05/18 1353   BP: 145/82   Pulse: 100     Vitals:    06/05/18 1353   Weight: 101 kg (223 lb)     Height: 5' 3" (160 cm)   Body mass index is 39 5 kg/m²      Physical Exam:   General appearance:  Appears stated age, alert, well appearing and in no distress  HEENT:  PERRLA, EOMI, no scleral icterus, no conjunctival pallor  NECK:  Supple, No elevated JVP, no thyromegaly, no carotid bruits  HEART:  Regular rate and rhythm, normal S1/S2, no S3/S4, no murmur or rub  LUNGS:  Clear to auscultation bilaterally  ABDOMEN:  Soft, non-tender, positive bowel sounds, no rebound or guarding, no organomegaly   EXTREMITIES:  No edema  VASCULAR:  Normal pedal pulses   SKIN: No lesions or rashes on exposed skin  NEURO:  CN II-XII intact, no focal deficits

## 2018-06-11 DIAGNOSIS — E11.00 TYPE 2 DIABETES MELLITUS WITH HYPEROSMOLARITY WITHOUT COMA, WITHOUT LONG-TERM CURRENT USE OF INSULIN (HCC): ICD-10-CM

## 2018-06-11 NOTE — TELEPHONE ENCOUNTER
Spoke to Crow Hinds   She is taking 500mg in Am and 1000mg pm          Sugars are runnin in AM  140-170 2 hrs after supper

## 2018-06-12 DIAGNOSIS — R06.83 SNORING: Primary | ICD-10-CM

## 2018-06-13 ENCOUNTER — TELEPHONE (OUTPATIENT)
Dept: SLEEP CENTER | Facility: HOSPITAL | Age: 58
End: 2018-06-13

## 2018-06-19 ENCOUNTER — LAB (OUTPATIENT)
Dept: LAB | Facility: CLINIC | Age: 58
End: 2018-06-19
Payer: COMMERCIAL

## 2018-06-19 ENCOUNTER — OFFICE VISIT (OUTPATIENT)
Dept: INTERNAL MEDICINE CLINIC | Facility: CLINIC | Age: 58
End: 2018-06-19
Payer: COMMERCIAL

## 2018-06-19 ENCOUNTER — TRANSCRIBE ORDERS (OUTPATIENT)
Dept: LAB | Facility: CLINIC | Age: 58
End: 2018-06-19

## 2018-06-19 VITALS
DIASTOLIC BLOOD PRESSURE: 70 MMHG | BODY MASS INDEX: 39.11 KG/M2 | TEMPERATURE: 98.1 F | HEIGHT: 63 IN | HEART RATE: 94 BPM | OXYGEN SATURATION: 96 % | SYSTOLIC BLOOD PRESSURE: 122 MMHG | WEIGHT: 220.7 LBS

## 2018-06-19 DIAGNOSIS — Z13.820 SCREENING FOR OSTEOPOROSIS: ICD-10-CM

## 2018-06-19 DIAGNOSIS — I10 ESSENTIAL (PRIMARY) HYPERTENSION: ICD-10-CM

## 2018-06-19 DIAGNOSIS — F41.9 ANXIETY: ICD-10-CM

## 2018-06-19 DIAGNOSIS — E11.00 TYPE 2 DIABETES MELLITUS WITH HYPEROSMOLARITY WITHOUT COMA, UNSPECIFIED WHETHER LONG TERM INSULIN USE (HCC): ICD-10-CM

## 2018-06-19 DIAGNOSIS — E11.9 TYPE 2 DIABETES MELLITUS WITHOUT COMPLICATIONS (HCC): ICD-10-CM

## 2018-06-19 DIAGNOSIS — E78.5 HYPERLIPIDEMIA: ICD-10-CM

## 2018-06-19 DIAGNOSIS — I44.7 LEFT BUNDLE BRANCH BLOCK (LBBB): ICD-10-CM

## 2018-06-19 DIAGNOSIS — I10 BENIGN ESSENTIAL HYPERTENSION: ICD-10-CM

## 2018-06-19 DIAGNOSIS — Z12.11 SCREENING FOR COLON CANCER: Primary | ICD-10-CM

## 2018-06-19 DIAGNOSIS — E66.9 OBESITY: ICD-10-CM

## 2018-06-19 DIAGNOSIS — E78.5 DYSLIPIDEMIA: ICD-10-CM

## 2018-06-19 DIAGNOSIS — I25.10 ATHEROSCLEROTIC HEART DISEASE OF NATIVE CORONARY ARTERY WITHOUT ANGINA PECTORIS: ICD-10-CM

## 2018-06-19 LAB
CHOLEST SERPL-MCNC: 131 MG/DL (ref 50–200)
EST. AVERAGE GLUCOSE BLD GHB EST-MCNC: 160 MG/DL
HBA1C MFR BLD: 7.2 % (ref 4.2–6.3)
HDLC SERPL-MCNC: 39 MG/DL (ref 40–60)
LDLC SERPL CALC-MCNC: 62 MG/DL (ref 0–100)
NONHDLC SERPL-MCNC: 92 MG/DL
TRIGL SERPL-MCNC: 152 MG/DL

## 2018-06-19 PROCEDURE — 3074F SYST BP LT 130 MM HG: CPT | Performed by: NURSE PRACTITIONER

## 2018-06-19 PROCEDURE — 3078F DIAST BP <80 MM HG: CPT | Performed by: NURSE PRACTITIONER

## 2018-06-19 PROCEDURE — 80061 LIPID PANEL: CPT

## 2018-06-19 PROCEDURE — 36415 COLL VENOUS BLD VENIPUNCTURE: CPT

## 2018-06-19 PROCEDURE — 83036 HEMOGLOBIN GLYCOSYLATED A1C: CPT

## 2018-06-19 PROCEDURE — 99214 OFFICE O/P EST MOD 30 MIN: CPT | Performed by: NURSE PRACTITIONER

## 2018-06-19 RX ORDER — NICOTINE 14MG/24HR
PATCH, TRANSDERMAL 24 HOURS TRANSDERMAL
Refills: 0 | COMMUNITY
Start: 2018-06-05 | End: 2018-06-19

## 2018-06-19 NOTE — PROGRESS NOTES
Assessment/Plan: Patients A1C is 7 1 and she is taking Metformin, Januvia, and Glipizide  She did follow up with Cardiology and was started on Crestor and her Zocor was discontinued bu she states when she did go to the pharmacy this was not there to   She will be starting the Nicotine patches coming up for smoking cessation  She has followed up with Hematology for her elevated WBC and is following back up with them in one year  She is going for her mammogram and GYN exam coming up  Will refer her to Podiatry and give her a script for a DEXA scan  She is deferring a colonoscopy and did have a FIT test done in 2017  She is up to date on her vaccines  Will follow up with her in 3 months or sooner if need be  No problem-specific Assessment & Plan notes found for this encounter           Problem List Items Addressed This Visit     Anxiety    Relevant Orders    TSH, 3rd generation with T4 reflex    Comprehensive metabolic panel    CBC and differential    Dyslipidemia    Relevant Orders    TSH, 3rd generation with T4 reflex    Comprehensive metabolic panel    CBC and differential    Benign essential hypertension    Relevant Orders    TSH, 3rd generation with T4 reflex    Comprehensive metabolic panel    CBC and differential    Type 2 diabetes mellitus (HCC)    Relevant Orders    HEMOGLOBIN A1C W/ EAG ESTIMATION    Microalbumin / creatinine urine ratio    TSH, 3rd generation with T4 reflex    Comprehensive metabolic panel    CBC and differential    Ambulatory referral to Podiatry    Left bundle branch block (LBBB)    Relevant Orders    TSH, 3rd generation with T4 reflex    Comprehensive metabolic panel    CBC and differential      Other Visit Diagnoses     Screening for colon cancer    -  Primary    Relevant Orders    TSH, 3rd generation with T4 reflex    Comprehensive metabolic panel    CBC and differential    Screening for osteoporosis        Relevant Orders    DXA bone density spine hip and pelvis Subjective:      Patient ID: Anu Gonzalez is a 62 y o  female  Nicky Roberson is here today for a follow up visit  She has seen Hematology regarding her ongoing elevated WBC count and at this time they are going to monitor her WBCs and did discuss the possibility of CLL or CML  She did follow up with Cardiology as well on June 5th and they are asking her to undergo a home sleep apnea study due to her ongoing sleep apnea symptoms  She states insurance will not cover the at home testing and she can not leave to have it done due to taking care of her brother  She states she was given the Nicotine patch for smoking cessation but has not yet started it  She does smoke around 10 cigarettes a day  She is having her mammogram coming up this month and did a FIT test back in November  She is having a GYN exam done as well  She has not yet had a DEXA scan  She is checking her sugars BID and they are ranging 130s to 150s  She denies any chest pain, SOB, or palpitations  She denies any depression or anxiety  She is having burning and pain in her B/L toes and would like a referral to podiatry  She offers no other complains  The following portions of the patient's history were reviewed and updated as appropriate:   She  has a past medical history of Cardiac disease; Chronic neck pain; Diabetes mellitus ([address] ); Hyperlipidemia; Hypertension; Left bundle branch block; Mild depression ([address] ); and Tonsillar hypertrophy    She   Patient Active Problem List    Diagnosis Date Noted    Presence of drug-eluting stent in right coronary artery 06/05/2018    Left bundle branch block (LBBB) 06/05/2018    Tobacco abuse 06/05/2018    Leukocytosis 04/24/2018    Type 2 diabetes mellitus ([address] ) 02/28/2017    Obesity 08/09/2013    Anxiety 06/03/2013    Atherosclerotic heart disease of native coronary artery without angina pectoris 06/03/2013    Dyslipidemia 06/03/2013    Benign essential hypertension 05/13/2013     She  has a past surgical history that includes Cardiac surgery and Coronary angioplasty with stent (05/28/2013)  Her family history includes Harnett's disease in her brother; Alcohol abuse in her brother; Arthritis in her father and mother; Bipolar disorder in her brother; Breast cancer in her cousin and maternal aunt; Cardiomyopathy in her mother; Cerebral aneurysm in her brother; Colon cancer (age of onset: 66) in her mother; Coronary artery disease in her father and mother; Diabetes type II in her father and maternal uncle; Fibrocystic breast disease in her mother; Gout in her father; Hyperlipidemia in her father; Hypertension in her mother and sister; Hypothyroidism in her sister; Kidney disease in her father; Lung cancer in her maternal uncle; Other in her brother and sister; Peripheral vascular disease in her maternal aunt; Prostate cancer in her father; Rheum arthritis in her sister; Stroke in her father; Thrombocytopenia in her daughter  She  reports that she has been smoking Cigarettes  She has a 10 00 pack-year smoking history  She has never used smokeless tobacco  She reports that she does not drink alcohol or use drugs    Current Outpatient Prescriptions   Medication Sig Dispense Refill    ACCU-CHEK TSERING PLUS test strip TEST twice a day  0    ALPRAZolam (XANAX) 0 25 mg tablet Take 0 25 mg by mouth daily at bedtime as needed for anxiety      aspirin 325 mg tablet Take 325 mg by mouth daily      Cholecalciferol (VITAMIN D3 PO) Take 2,000 Units by mouth 2 (two) times a day      cyanocobalamin 2000 MCG tablet Take 2,000 mcg by mouth daily      glipiZIDE (GLUCOTROL) 5 mg tablet Take 1 tablet (5 mg total) by mouth daily 30 tablet 3    ibuprofen (MOTRIN) 600 mg tablet Take 1 tablet by mouth every 6 (six) hours as needed for mild pain for up to 3 days 30 tablet 0    Lancets (ACCU-CHEK SOFT TOUCH) lancets by Other route 2 (two) times a day 100 each 3    losartan (COZAAR) 100 MG tablet Take 1 tablet (100 mg total) by mouth daily 90 tablet 3    metFORMIN (GLUCOPHAGE) 500 mg tablet TAKE 1 TABLET BY MOUTH IN [facility] AND 2 IN THE EVENING 90 tablet 3    metoprolol tartrate (LOPRESSOR) 25 mg tablet Take 25 mg by mouth 2 (two) times a day      Multiple Vitamin-Folic Acid TABS Take by mouth      nicotine (NICODERM CQ) 14 mg/24hr TD 24 hr patch Place 1 patch on the skin every 24 hours 28 patch 0    nitroglycerin (NITROSTAT) 0 4 mg SL tablet Place 0 4 mg under the tongue every 5 (five) minutes as needed for chest pain      rosuvastatin (CRESTOR) 40 MG tablet Take 1 tablet (40 mg total) by mouth daily 90 tablet 3    sitaGLIPtin (JANUVIA) 100 mg tablet Take 100 mg by mouth daily      furosemide (LASIX) 40 mg tablet Take 1 tablet by mouth daily as needed         No current facility-administered medications for this visit        Current Outpatient Prescriptions on File Prior to Visit   Medication Sig    ACCU-CHEK TSERING PLUS test strip TEST twice a day    ALPRAZolam (XANAX) 0 25 mg tablet Take 0 25 mg by mouth daily at bedtime as needed for anxiety    aspirin 325 mg tablet Take 325 mg by mouth daily    Cholecalciferol (VITAMIN D3 PO) Take 2,000 Units by mouth 2 (two) times a day    cyanocobalamin 2000 MCG tablet Take 2,000 mcg by mouth daily    glipiZIDE (GLUCOTROL) 5 mg tablet Take 1 tablet (5 mg total) by mouth daily    ibuprofen (MOTRIN) 600 mg tablet Take 1 tablet by mouth every 6 (six) hours as needed for mild pain for up to 3 days    Lancets (ACCU-CHEK SOFT TOUCH) lancets by Other route 2 (two) times a day    losartan (COZAAR) 100 MG tablet Take 1 tablet (100 mg total) by mouth daily    metFORMIN (GLUCOPHAGE) 500 mg tablet TAKE 1 TABLET BY MOUTH IN [facility] AND 2 IN THE EVENING    metoprolol tartrate (LOPRESSOR) 25 mg tablet Take 25 mg by mouth 2 (two) times a day    Multiple Vitamin-Folic Acid TABS Take by mouth    nicotine (NICODERM CQ) 14 mg/24hr TD 24 hr patch Place 1 patch on the skin every 24 hours  nitroglycerin (NITROSTAT) 0 4 mg SL tablet Place 0 4 mg under the tongue every 5 (five) minutes as needed for chest pain    rosuvastatin (CRESTOR) 40 MG tablet Take 1 tablet (40 mg total) by mouth daily    sitaGLIPtin (JANUVIA) 100 mg tablet Take 100 mg by mouth daily    furosemide (LASIX) 40 mg tablet Take 1 tablet by mouth daily as needed       No current facility-administered medications on file prior to visit  She is allergic to hctz [hydrochlorothiazide]; wellbutrin [bupropion]; and ace inhibitors       Review of Systems   Constitutional: Negative  HENT: Negative  Eyes: Negative  Respiratory: Negative  Cardiovascular: Negative  Gastrointestinal: Negative  Endocrine: Negative  Genitourinary: Negative  Musculoskeletal: Negative  Skin: Negative  Allergic/Immunologic: Negative  Neurological: Positive for numbness  Hematological: Negative  Psychiatric/Behavioral: Negative  Below is the patient's most recent value for Albumin, ALT, AST, BUN, Calcium, Chloride, Cholesterol, CO2, Creatinine, GFR, Glucose, HDL, Hematocrit, Hemoglobin, Hemoglobin A1C, LDL, Magnesium, Phosphorus, Platelets, Potassium, PSA, Sodium, Triglycerides, and WBC  Lab Results   Component Value Date    ALT 27 04/24/2018    AST 12 04/24/2018    BUN 11 04/24/2018    CALCIUM 9 2 04/24/2018     04/24/2018    CHOL 154 03/16/2018    CO2 28 04/24/2018    CREATININE 0 73 04/24/2018    HDL 46 03/16/2018    HCT 41 9 04/24/2018    HGB 13 8 04/24/2018    HGBA1C 7 1 (H) 04/24/2018     04/24/2018    K 4 0 04/24/2018     04/24/2018    TRIG 125 03/16/2018    WBC 13 37 (H) 04/24/2018     Note: for a comprehensive list of the patient's lab results, access the Results Review activity  Objective:      /70 (BP Location: Right arm, Patient Position: Sitting, Cuff Size: Large)   Pulse 94   Temp 98 1 °F (36 7 °C) (Temporal)   Ht 5' 3" (1 6 m)   Wt 100 kg (220 lb 11 2 oz)   SpO2 96% BMI 39 10 kg/m²          Physical Exam   Constitutional: She is oriented to person, place, and time  She appears well-developed and well-nourished  HENT:   Head: Normocephalic and atraumatic  Right Ear: External ear normal    Left Ear: External ear normal    Nose: Nose normal    Mouth/Throat: Oropharynx is clear and moist    Eyes: Conjunctivae and EOM are normal  Pupils are equal, round, and reactive to light  Neck: Normal range of motion  Neck supple  Cardiovascular: Normal rate, regular rhythm, normal heart sounds and intact distal pulses  Pulmonary/Chest: Effort normal and breath sounds normal    Abdominal: Soft  Bowel sounds are normal    Musculoskeletal: Normal range of motion  Neurological: She is alert and oriented to person, place, and time  She has normal reflexes  Skin: Skin is warm and dry  Psychiatric: She has a normal mood and affect  Her behavior is normal  Judgment and thought content normal    Vitals reviewed

## 2018-06-20 NOTE — PROGRESS NOTES
Can you let Maria Eugeniastanley Blackmanon know her A1C is up at 7 2  I want her to continue her medications and will recheck again in 3 months  Her triglycerides are up at 152 we like this less than 150  Will recheck again as well

## 2018-07-05 DIAGNOSIS — E11.00 TYPE 2 DIABETES MELLITUS WITH HYPEROSMOLARITY WITHOUT COMA, WITHOUT LONG-TERM CURRENT USE OF INSULIN (HCC): ICD-10-CM

## 2018-07-05 DIAGNOSIS — I10 ESSENTIAL HYPERTENSION: ICD-10-CM

## 2018-07-05 RX ORDER — GLIPIZIDE 5 MG/1
5 TABLET ORAL DAILY
Qty: 30 TABLET | Refills: 3 | Status: SHIPPED | OUTPATIENT
Start: 2018-07-05 | End: 2018-11-19 | Stop reason: SDUPTHER

## 2018-07-05 RX ORDER — GLIPIZIDE 5 MG/1
TABLET ORAL
Qty: 30 TABLET | Refills: 3 | Status: CANCELLED | OUTPATIENT
Start: 2018-07-05

## 2018-07-05 RX ORDER — LOSARTAN POTASSIUM 50 MG/1
TABLET ORAL
Qty: 30 TABLET | Refills: 3 | OUTPATIENT
Start: 2018-07-05

## 2018-07-09 DIAGNOSIS — E13.9 DIABETES 1.5, MANAGED AS TYPE 2 (HCC): Primary | ICD-10-CM

## 2018-07-09 RX ORDER — BLOOD SUGAR DIAGNOSTIC
STRIP MISCELLANEOUS
Qty: 100 EACH | Refills: 3 | Status: SHIPPED | OUTPATIENT
Start: 2018-07-09 | End: 2019-03-26 | Stop reason: SDUPTHER

## 2018-07-13 DIAGNOSIS — E13.9 DIABETES 1.5, MANAGED AS TYPE 2 (HCC): Primary | ICD-10-CM

## 2018-07-13 NOTE — TELEPHONE ENCOUNTER
Received a call from [facility] requesting a refill on januvia 100mg qd  Verified sig and pharmacy  Aware of 48 hr fill policy  Please advise

## 2018-07-27 ENCOUNTER — TELEPHONE (OUTPATIENT)
Dept: OTOLARYNGOLOGY | Facility: CLINIC | Age: 58
End: 2018-07-27

## 2018-08-01 DIAGNOSIS — E78.49 OTHER HYPERLIPIDEMIA: ICD-10-CM

## 2018-08-01 RX ORDER — SIMVASTATIN 40 MG
TABLET ORAL
Qty: 30 TABLET | Refills: 3 | OUTPATIENT
Start: 2018-08-01

## 2018-08-22 DIAGNOSIS — E78.49 OTHER HYPERLIPIDEMIA: ICD-10-CM

## 2018-08-22 DIAGNOSIS — E11.9 TYPE 2 DIABETES MELLITUS WITHOUT COMPLICATION (HCC): ICD-10-CM

## 2018-08-22 RX ORDER — SIMVASTATIN 40 MG
TABLET ORAL
Qty: 30 TABLET | Refills: 3 | OUTPATIENT
Start: 2018-08-22

## 2018-08-22 RX ORDER — LANCETS
EACH MISCELLANEOUS
Qty: 100 EACH | Refills: 3 | Status: SHIPPED | OUTPATIENT
Start: 2018-08-22 | End: 2019-04-04 | Stop reason: SDUPTHER

## 2018-08-25 DIAGNOSIS — E78.49 OTHER HYPERLIPIDEMIA: ICD-10-CM

## 2018-08-26 RX ORDER — SIMVASTATIN 40 MG
TABLET ORAL
Qty: 30 TABLET | Refills: 3 | OUTPATIENT
Start: 2018-08-26

## 2018-08-29 ENCOUNTER — TELEPHONE (OUTPATIENT)
Dept: SLEEP CENTER | Facility: HOSPITAL | Age: 58
End: 2018-08-29

## 2018-09-30 DIAGNOSIS — E11.00 TYPE 2 DIABETES MELLITUS WITH HYPEROSMOLARITY WITHOUT COMA, WITHOUT LONG-TERM CURRENT USE OF INSULIN (HCC): ICD-10-CM

## 2018-10-03 ENCOUNTER — OFFICE VISIT (OUTPATIENT)
Dept: INTERNAL MEDICINE CLINIC | Facility: CLINIC | Age: 58
End: 2018-10-03
Payer: COMMERCIAL

## 2018-10-03 VITALS
SYSTOLIC BLOOD PRESSURE: 142 MMHG | OXYGEN SATURATION: 96 % | DIASTOLIC BLOOD PRESSURE: 76 MMHG | TEMPERATURE: 98.2 F | HEIGHT: 63 IN | BODY MASS INDEX: 38.84 KG/M2 | HEART RATE: 117 BPM | WEIGHT: 219.2 LBS

## 2018-10-03 DIAGNOSIS — I44.7 LEFT BUNDLE BRANCH BLOCK (LBBB): ICD-10-CM

## 2018-10-03 DIAGNOSIS — Z23 NEED FOR INFLUENZA VACCINATION: ICD-10-CM

## 2018-10-03 DIAGNOSIS — I10 BENIGN ESSENTIAL HYPERTENSION: ICD-10-CM

## 2018-10-03 DIAGNOSIS — I25.10 ATHEROSCLEROSIS OF NATIVE CORONARY ARTERY OF NATIVE HEART WITHOUT ANGINA PECTORIS: ICD-10-CM

## 2018-10-03 DIAGNOSIS — E78.5 DYSLIPIDEMIA: ICD-10-CM

## 2018-10-03 DIAGNOSIS — Z12.11 SCREENING FOR COLON CANCER: Primary | ICD-10-CM

## 2018-10-03 DIAGNOSIS — Z72.0 TOBACCO ABUSE: ICD-10-CM

## 2018-10-03 DIAGNOSIS — E11.00 TYPE 2 DIABETES MELLITUS WITH HYPEROSMOLARITY WITHOUT COMA, UNSPECIFIED WHETHER LONG TERM INSULIN USE (HCC): ICD-10-CM

## 2018-10-03 PROCEDURE — 90471 IMMUNIZATION ADMIN: CPT | Performed by: NURSE PRACTITIONER

## 2018-10-03 PROCEDURE — 90682 RIV4 VACC RECOMBINANT DNA IM: CPT | Performed by: NURSE PRACTITIONER

## 2018-10-03 PROCEDURE — 99214 OFFICE O/P EST MOD 30 MIN: CPT | Performed by: NURSE PRACTITIONER

## 2018-10-03 NOTE — PROGRESS NOTES
Assessment/Plan:Patients A1C is 7 1 and she is taking Metformin, Januvia, and Glipizide  She did follow up with Cardiology and was started on Crestor and her Zocor was discontinued  She has not yet started her Nicotine patches yet due to her stress level and will start them soon  She has followed up with Hematology for her elevated WBC and is following back up with them in one year  She is going for her mammogram and GYN exam coming up  Will refer her to Podiatry and give her a script for a DEXA scan  She is deferring a colonoscopy and did have a FIT test done in 2017  She is up to date on her vaccines  Will give scripts to have repeat labs done and she will get the Influenza vaccine today  Will follow up with her in 6 months or sooner if need be  No problem-specific Assessment & Plan notes found for this encounter           Problem List Items Addressed This Visit     Atherosclerotic heart disease of native coronary artery without angina pectoris    Relevant Orders    Comprehensive metabolic panel    CBC and differential    TSH, 3rd generation with Free T4 reflex    Dyslipidemia    Relevant Orders    Comprehensive metabolic panel    CBC and differential    TSH, 3rd generation with Free T4 reflex    Lipid panel    Benign essential hypertension    Relevant Orders    Comprehensive metabolic panel    CBC and differential    TSH, 3rd generation with Free T4 reflex    Type 2 diabetes mellitus (HCC)    Relevant Orders    Comprehensive metabolic panel    CBC and differential    TSH, 3rd generation with Free T4 reflex    HEMOGLOBIN A1C W/ EAG ESTIMATION    Left bundle branch block (LBBB)    Relevant Orders    Comprehensive metabolic panel    CBC and differential    TSH, 3rd generation with Free T4 reflex    Tobacco abuse    Relevant Orders    Comprehensive metabolic panel    CBC and differential    TSH, 3rd generation with Free T4 reflex      Other Visit Diagnoses     Screening for colon cancer    -  Primary Subjective:      Patient ID: Sathish Valdovinos is a 62 y o  female  Debo Knowles is here today for a follow up visit  She is doing well today she has not yet had her lab work done yet but will be having this done tomorrow  She states her sugars have been running good and does keep track of them and takes them daily  She has not yet gone for her mammogram and Dexa scan but does plan on this in the near future  She is deferring a colonoscopy at this time  She is having a lot of stress with her brother and his drinking  She states she is still smoking around 1/2 PPD due to worrying about him and him possibly falling when she is not around  She also states her sister did fall and shatter her elbow so she has a lot going on right now  She denies any chest pain, SOB, or palpitations  She is following up with Cardiology and is due again in the next upcoming months  She offers no complaints of anxiety or depression  She offers no other new complaints today  The following portions of the patient's history were reviewed and updated as appropriate:   She  has a past medical history of Cardiac disease; Chronic neck pain; Diabetes mellitus ([address] ); Hyperlipidemia; Hypertension; Left bundle branch block; Mild depression ([address] ); and Tonsillar hypertrophy  She   Patient Active Problem List    Diagnosis Date Noted    Presence of drug-eluting stent in right coronary artery 06/05/2018    Left bundle branch block (LBBB) 06/05/2018    Tobacco abuse 06/05/2018    Leukocytosis 04/24/2018    Type 2 diabetes mellitus ([address] ) 02/28/2017    Obesity 08/09/2013    Anxiety 06/03/2013    Atherosclerotic heart disease of native coronary artery without angina pectoris 06/03/2013    Dyslipidemia 06/03/2013    Benign essential hypertension 05/13/2013     She  has a past surgical history that includes Cardiac surgery and Coronary angioplasty with stent (05/28/2013)    Her family history includes Metcalfe's disease in her brother; Alcohol abuse in her brother; Arthritis in her father and mother; Bipolar disorder in her brother; Breast cancer in her cousin and maternal aunt; Cardiomyopathy in her mother; Cerebral aneurysm in her brother; Colon cancer (age of onset: 66) in her mother; Coronary artery disease in her father and mother; Diabetes type II in her father and maternal uncle; Fibrocystic breast disease in her mother; Gout in her father; Hyperlipidemia in her father; Hypertension in her mother and sister; Hypothyroidism in her sister; Kidney disease in her father; Lung cancer in her maternal uncle; Other in her brother and sister; Peripheral vascular disease in her maternal aunt; Prostate cancer in her father; Rheum arthritis in her sister; Stroke in her father; Thrombocytopenia in her daughter  She  reports that she has been smoking Cigarettes  She has a 10 00 pack-year smoking history  She has never used smokeless tobacco  She reports that she does not drink alcohol or use drugs    Current Outpatient Prescriptions   Medication Sig Dispense Refill    ACCU-CHEK TSERING PLUS test strip TEST twice a day 100 each 3    ACCU-CHEK SOFTCLIX LANCETS lancets TEST twice a day 100 each 3    ALPRAZolam (XANAX) 0 25 mg tablet Take 0 25 mg by mouth daily at bedtime as needed for anxiety      aspirin 325 mg tablet Take 325 mg by mouth daily      Cholecalciferol (VITAMIN D3 PO) Take 2,000 Units by mouth 2 (two) times a day      cyanocobalamin 2000 MCG tablet Take 2,000 mcg by mouth daily      glipiZIDE (GLUCOTROL) 5 mg tablet Take 1 tablet (5 mg total) by mouth daily 30 tablet 3    losartan (COZAAR) 100 MG tablet Take 1 tablet (100 mg total) by mouth daily 90 tablet 3    metFORMIN (GLUCOPHAGE) 500 mg tablet TAKE 1 TABLET BY MOUTH IN THE MORNING AND 2 IN THE EVEVING 90 tablet 3    metoprolol tartrate (LOPRESSOR) 25 mg tablet Take 25 mg by mouth 2 (two) times a day      Multiple Vitamin-Folic Acid TABS Take by mouth      rosuvastatin (CRESTOR) 40 MG tablet Take 1 tablet (40 mg total) by mouth daily 90 tablet 3    sitaGLIPtin (JANUVIA) 100 mg tablet Take 1 tablet (100 mg total) by mouth daily 90 tablet 3    furosemide (LASIX) 40 mg tablet Take 1 tablet by mouth daily as needed        nicotine (NICODERM CQ) 14 mg/24hr TD 24 hr patch Place 1 patch on the skin every 24 hours (Patient not taking: Reported on 10/3/2018 ) 28 patch 0    nitroglycerin (NITROSTAT) 0 4 mg SL tablet Place 0 4 mg under the tongue every 5 (five) minutes as needed for chest pain       No current facility-administered medications for this visit        Current Outpatient Prescriptions on File Prior to Visit   Medication Sig    ACCU-CHEK TSERING PLUS test strip TEST twice a day    ACCU-CHEK SOFTCLIX LANCETS lancets TEST twice a day    ALPRAZolam (XANAX) 0 25 mg tablet Take 0 25 mg by mouth daily at bedtime as needed for anxiety    aspirin 325 mg tablet Take 325 mg by mouth daily    Cholecalciferol (VITAMIN D3 PO) Take 2,000 Units by mouth 2 (two) times a day    cyanocobalamin 2000 MCG tablet Take 2,000 mcg by mouth daily    glipiZIDE (GLUCOTROL) 5 mg tablet Take 1 tablet (5 mg total) by mouth daily    losartan (COZAAR) 100 MG tablet Take 1 tablet (100 mg total) by mouth daily    metFORMIN (GLUCOPHAGE) 500 mg tablet TAKE 1 TABLET BY MOUTH IN THE MORNING AND 2 IN THE EVEVING    metoprolol tartrate (LOPRESSOR) 25 mg tablet Take 25 mg by mouth 2 (two) times a day    Multiple Vitamin-Folic Acid TABS Take by mouth    rosuvastatin (CRESTOR) 40 MG tablet Take 1 tablet (40 mg total) by mouth daily    sitaGLIPtin (JANUVIA) 100 mg tablet Take 1 tablet (100 mg total) by mouth daily    furosemide (LASIX) 40 mg tablet Take 1 tablet by mouth daily as needed      nicotine (NICODERM CQ) 14 mg/24hr TD 24 hr patch Place 1 patch on the skin every 24 hours (Patient not taking: Reported on 10/3/2018 )    nitroglycerin (NITROSTAT) 0 4 mg SL tablet Place 0 4 mg under the tongue every 5 (five) minutes as needed for chest pain    [DISCONTINUED] ibuprofen (MOTRIN) 600 mg tablet Take 1 tablet by mouth every 6 (six) hours as needed for mild pain for up to 3 days     No current facility-administered medications on file prior to visit  She is allergic to hctz [hydrochlorothiazide]; wellbutrin [bupropion]; and ace inhibitors       Review of Systems   All other systems reviewed and are negative  Below is the patient's most recent value for Albumin, ALT, AST, BUN, Calcium, Chloride, Cholesterol, CO2, Creatinine, GFR, Glucose, HDL, Hematocrit, Hemoglobin, Hemoglobin A1C, LDL, Magnesium, Phosphorus, Platelets, Potassium, PSA, Sodium, Triglycerides, and WBC  Lab Results   Component Value Date    ALT 27 04/24/2018    AST 12 04/24/2018    BUN 11 04/24/2018    CALCIUM 9 2 04/24/2018     04/24/2018    CO2 28 04/24/2018    CREATININE 0 73 04/24/2018    HDL 39 (L) 06/19/2018    HCT 41 9 04/24/2018    HGB 13 8 04/24/2018    HGBA1C 7 2 (H) 06/19/2018     04/24/2018    K 4 0 04/24/2018     04/24/2018    TRIG 152 (H) 06/19/2018    WBC 13 37 (H) 04/24/2018     Note: for a comprehensive list of the patient's lab results, access the Results Review activity  Objective:      /76 (BP Location: Right arm, Patient Position: Sitting, Cuff Size: Large)   Pulse (!) 117   Temp 98 2 °F (36 8 °C) (Temporal)   Ht 5' 3" (1 6 m)   Wt 99 4 kg (219 lb 3 2 oz)   SpO2 96%   BMI 38 83 kg/m²          Physical Exam   Constitutional: She is oriented to person, place, and time  She appears well-developed and well-nourished  HENT:   Head: Normocephalic and atraumatic  Right Ear: External ear normal    Left Ear: External ear normal    Nose: Nose normal    Mouth/Throat: Oropharynx is clear and moist    Eyes: Pupils are equal, round, and reactive to light  Conjunctivae and EOM are normal    Neck: Normal range of motion  Neck supple     Cardiovascular: Normal rate, regular rhythm, normal heart sounds and intact distal pulses  Pulmonary/Chest: Effort normal and breath sounds normal    Abdominal: Soft  Bowel sounds are normal    Musculoskeletal: Normal range of motion  Neurological: She is alert and oriented to person, place, and time  She has normal reflexes  Skin: Skin is warm and dry  Psychiatric: She has a normal mood and affect  Her behavior is normal  Judgment and thought content normal    Vitals reviewed

## 2018-10-12 ENCOUNTER — TELEPHONE (OUTPATIENT)
Dept: INTERNAL MEDICINE CLINIC | Facility: CLINIC | Age: 58
End: 2018-10-12

## 2018-10-12 DIAGNOSIS — R06.83 SNORING: Primary | ICD-10-CM

## 2018-10-12 NOTE — TELEPHONE ENCOUNTER
Rhetta Deacon called the office and stated that the patient would like a home sleep study to be done  Script needs to go in the system and authorization needs to be done

## 2018-10-12 NOTE — TELEPHONE ENCOUNTER
[facility] sleep study lab called stating that [facility] will not pay for a home study   Please put another referral in for a diagnostic study at the hospital

## 2018-10-15 NOTE — TELEPHONE ENCOUNTER
I did look at my notes and I have nothing in there about a sleep study can you find out what symptoms she is having

## 2018-10-17 ENCOUNTER — TELEPHONE (OUTPATIENT)
Dept: INTERNAL MEDICINE CLINIC | Facility: CLINIC | Age: 58
End: 2018-10-17

## 2018-10-17 NOTE — TELEPHONE ENCOUNTER
[facility] Zam[facility] called [facility] and informed me that patient already had sleep study approved from 6/30/18-9/28/18 but never went for it  Angel Zamora has extended the authorization until end of October  Cardiology has originated with sleep order  Auth # [id number]  I called and spoke with Ina Fernandez from [facility] sleep lab and informed her that she can schedule the home sleep study

## 2018-10-17 NOTE — TELEPHONE ENCOUNTER
Kenney Farfan had called the office from [place] [address] and stated that the patient had already had request in for a home study and was approved but never had it done  I informed her that I didn't see anything in the chart and got a call that she needed one done  Kenney Farfan states that it had been approved from 6/30/18-9/28/18  Cardiology had originated the order  Kenney Farfan advised me that she will extend the approval until the end of October 2018  Jayleen Jeancarlos #[id number]  I called and spoke with Lion Minor from [facility] sleep lab and informed her that she needs to schedule for the home study

## 2018-11-02 DIAGNOSIS — I10 ESSENTIAL HYPERTENSION: Primary | ICD-10-CM

## 2018-11-19 DIAGNOSIS — E11.00 TYPE 2 DIABETES MELLITUS WITH HYPEROSMOLARITY WITHOUT COMA, WITHOUT LONG-TERM CURRENT USE OF INSULIN (HCC): ICD-10-CM

## 2018-11-19 RX ORDER — GLIPIZIDE 5 MG/1
5 TABLET ORAL DAILY
Qty: 30 TABLET | Refills: 3 | Status: SHIPPED | OUTPATIENT
Start: 2018-11-19 | End: 2019-03-09 | Stop reason: SDUPTHER

## 2019-01-30 DIAGNOSIS — E11.00 TYPE 2 DIABETES MELLITUS WITH HYPEROSMOLARITY WITHOUT COMA, WITHOUT LONG-TERM CURRENT USE OF INSULIN (HCC): ICD-10-CM

## 2019-03-09 DIAGNOSIS — E11.00 TYPE 2 DIABETES MELLITUS WITH HYPEROSMOLARITY WITHOUT COMA, WITHOUT LONG-TERM CURRENT USE OF INSULIN (HCC): ICD-10-CM

## 2019-03-10 RX ORDER — GLIPIZIDE 5 MG/1
TABLET ORAL
Qty: 30 TABLET | Refills: 3 | Status: SHIPPED | OUTPATIENT
Start: 2019-03-10 | End: 2019-07-05 | Stop reason: SDUPTHER

## 2019-03-26 DIAGNOSIS — E13.9 DIABETES 1.5, MANAGED AS TYPE 2 (HCC): ICD-10-CM

## 2019-03-26 RX ORDER — BLOOD SUGAR DIAGNOSTIC
STRIP MISCELLANEOUS
Qty: 100 EACH | Refills: 3 | Status: SHIPPED | OUTPATIENT
Start: 2019-03-26 | End: 2019-10-25 | Stop reason: SDUPTHER

## 2019-04-03 ENCOUNTER — OFFICE VISIT (OUTPATIENT)
Dept: INTERNAL MEDICINE CLINIC | Facility: CLINIC | Age: 59
End: 2019-04-03
Payer: COMMERCIAL

## 2019-04-03 VITALS
SYSTOLIC BLOOD PRESSURE: 142 MMHG | BODY MASS INDEX: 39.58 KG/M2 | DIASTOLIC BLOOD PRESSURE: 78 MMHG | HEIGHT: 63 IN | OXYGEN SATURATION: 95 % | HEART RATE: 105 BPM | WEIGHT: 223.4 LBS | TEMPERATURE: 98.1 F

## 2019-04-03 DIAGNOSIS — H04.129 DRY EYE: ICD-10-CM

## 2019-04-03 DIAGNOSIS — J01.10 ACUTE NON-RECURRENT FRONTAL SINUSITIS: Primary | ICD-10-CM

## 2019-04-03 DIAGNOSIS — H57.89 EYE DRAINAGE: ICD-10-CM

## 2019-04-03 PROCEDURE — 87147 CULTURE TYPE IMMUNOLOGIC: CPT | Performed by: NURSE PRACTITIONER

## 2019-04-03 PROCEDURE — 3008F BODY MASS INDEX DOCD: CPT | Performed by: NURSE PRACTITIONER

## 2019-04-03 PROCEDURE — 99214 OFFICE O/P EST MOD 30 MIN: CPT | Performed by: NURSE PRACTITIONER

## 2019-04-03 PROCEDURE — 87070 CULTURE OTHR SPECIMN AEROBIC: CPT | Performed by: NURSE PRACTITIONER

## 2019-04-03 PROCEDURE — 87205 SMEAR GRAM STAIN: CPT | Performed by: NURSE PRACTITIONER

## 2019-04-03 PROCEDURE — 87186 SC STD MICRODIL/AGAR DIL: CPT | Performed by: NURSE PRACTITIONER

## 2019-04-03 RX ORDER — AMOXICILLIN AND CLAVULANATE POTASSIUM 875; 125 MG/1; MG/1
1 TABLET, FILM COATED ORAL 2 TIMES DAILY
Qty: 20 TABLET | Refills: 0 | Status: SHIPPED | OUTPATIENT
Start: 2019-04-03 | End: 2019-04-13

## 2019-04-03 RX ORDER — FLUTICASONE PROPIONATE 50 MCG
1 SPRAY, SUSPENSION (ML) NASAL DAILY
Qty: 1 BOTTLE | Refills: 5 | Status: SHIPPED | OUTPATIENT
Start: 2019-04-03 | End: 2021-05-26 | Stop reason: SDUPTHER

## 2019-04-03 RX ORDER — CETIRIZINE HYDROCHLORIDE 10 MG/1
10 TABLET ORAL DAILY
Qty: 30 TABLET | Refills: 3 | Status: SHIPPED | OUTPATIENT
Start: 2019-04-03 | End: 2019-08-14 | Stop reason: SDUPTHER

## 2019-04-04 DIAGNOSIS — B95.8 STAPHYLOCOCCAL INFECTION: Primary | ICD-10-CM

## 2019-04-04 DIAGNOSIS — E11.9 TYPE 2 DIABETES MELLITUS WITHOUT COMPLICATION (HCC): ICD-10-CM

## 2019-04-04 RX ORDER — GENTAMICIN SULFATE 3 MG/ML
1 SOLUTION/ DROPS OPHTHALMIC EVERY 4 HOURS
Qty: 5 ML | Refills: 0 | Status: SHIPPED | OUTPATIENT
Start: 2019-04-04 | End: 2019-05-02

## 2019-04-05 LAB
BACTERIA WND AEROBE CULT: ABNORMAL
GRAM STN SPEC: ABNORMAL
GRAM STN SPEC: ABNORMAL

## 2019-04-05 RX ORDER — LANCETS
EACH MISCELLANEOUS
Qty: 100 EACH | Refills: 3 | Status: SHIPPED | OUTPATIENT
Start: 2019-04-05 | End: 2019-12-15 | Stop reason: SDUPTHER

## 2019-04-30 ENCOUNTER — APPOINTMENT (OUTPATIENT)
Dept: LAB | Facility: HOSPITAL | Age: 59
End: 2019-04-30
Payer: COMMERCIAL

## 2019-04-30 DIAGNOSIS — E11.00 TYPE 2 DIABETES MELLITUS WITH HYPEROSMOLARITY WITHOUT COMA, UNSPECIFIED WHETHER LONG TERM INSULIN USE (HCC): ICD-10-CM

## 2019-04-30 DIAGNOSIS — Z72.0 TOBACCO ABUSE: ICD-10-CM

## 2019-04-30 DIAGNOSIS — E78.5 DYSLIPIDEMIA: ICD-10-CM

## 2019-04-30 DIAGNOSIS — I10 BENIGN ESSENTIAL HYPERTENSION: ICD-10-CM

## 2019-04-30 DIAGNOSIS — I25.10 ATHEROSCLEROSIS OF NATIVE CORONARY ARTERY OF NATIVE HEART WITHOUT ANGINA PECTORIS: ICD-10-CM

## 2019-04-30 DIAGNOSIS — I44.7 LEFT BUNDLE BRANCH BLOCK (LBBB): ICD-10-CM

## 2019-04-30 LAB
ALBUMIN SERPL BCP-MCNC: 3.7 G/DL (ref 3.5–5)
ALP SERPL-CCNC: 108 U/L (ref 46–116)
ALT SERPL W P-5'-P-CCNC: 55 U/L (ref 12–78)
ANION GAP SERPL CALCULATED.3IONS-SCNC: 8 MMOL/L (ref 4–13)
AST SERPL W P-5'-P-CCNC: 30 U/L (ref 5–45)
BASOPHILS # BLD AUTO: 0.07 THOUSANDS/ΜL (ref 0–0.1)
BASOPHILS NFR BLD AUTO: 1 % (ref 0–1)
BILIRUB SERPL-MCNC: 0.2 MG/DL (ref 0.2–1)
BUN SERPL-MCNC: 9 MG/DL (ref 5–25)
CALCIUM SERPL-MCNC: 9 MG/DL (ref 8.3–10.1)
CHLORIDE SERPL-SCNC: 102 MMOL/L (ref 100–108)
CHOLEST SERPL-MCNC: 106 MG/DL (ref 50–200)
CO2 SERPL-SCNC: 25 MMOL/L (ref 21–32)
CREAT SERPL-MCNC: 1.05 MG/DL (ref 0.6–1.3)
EOSINOPHIL # BLD AUTO: 0.22 THOUSAND/ΜL (ref 0–0.61)
EOSINOPHIL NFR BLD AUTO: 2 % (ref 0–6)
ERYTHROCYTE [DISTWIDTH] IN BLOOD BY AUTOMATED COUNT: 15.9 % (ref 11.6–15.1)
EST. AVERAGE GLUCOSE BLD GHB EST-MCNC: 177 MG/DL
GFR SERPL CREATININE-BSD FRML MDRD: 59 ML/MIN/1.73SQ M
GLUCOSE P FAST SERPL-MCNC: 194 MG/DL (ref 65–99)
HBA1C MFR BLD: 7.8 % (ref 4.2–6.3)
HCT VFR BLD AUTO: 42.7 % (ref 34.8–46.1)
HDLC SERPL-MCNC: 40 MG/DL (ref 40–60)
HGB BLD-MCNC: 13.3 G/DL (ref 11.5–15.4)
IMM GRANULOCYTES # BLD AUTO: 0.04 THOUSAND/UL (ref 0–0.2)
IMM GRANULOCYTES NFR BLD AUTO: 0 % (ref 0–2)
LDLC SERPL CALC-MCNC: 45 MG/DL (ref 0–100)
LEFT EYE DIABETIC RETINOPATHY: NORMAL
LEFT EYE DIABETIC RETINOPATHY: NORMAL
LYMPHOCYTES # BLD AUTO: 3.16 THOUSANDS/ΜL (ref 0.6–4.47)
LYMPHOCYTES NFR BLD AUTO: 28 % (ref 14–44)
MCH RBC QN AUTO: 28.4 PG (ref 26.8–34.3)
MCHC RBC AUTO-ENTMCNC: 31.1 G/DL (ref 31.4–37.4)
MCV RBC AUTO: 91 FL (ref 82–98)
MONOCYTES # BLD AUTO: 0.75 THOUSAND/ΜL (ref 0.17–1.22)
MONOCYTES NFR BLD AUTO: 7 % (ref 4–12)
NEUTROPHILS # BLD AUTO: 7.03 THOUSANDS/ΜL (ref 1.85–7.62)
NEUTS SEG NFR BLD AUTO: 62 % (ref 43–75)
NONHDLC SERPL-MCNC: 66 MG/DL
NRBC BLD AUTO-RTO: 0 /100 WBCS
PLATELET # BLD AUTO: 255 THOUSANDS/UL (ref 149–390)
PMV BLD AUTO: 9.7 FL (ref 8.9–12.7)
POTASSIUM SERPL-SCNC: 4.2 MMOL/L (ref 3.5–5.3)
PROT SERPL-MCNC: 7.8 G/DL (ref 6.4–8.2)
RBC # BLD AUTO: 4.69 MILLION/UL (ref 3.81–5.12)
RIGHT EYE DIABETIC RETINOPATHY: NORMAL
RIGHT EYE DIABETIC RETINOPATHY: NORMAL
SODIUM SERPL-SCNC: 135 MMOL/L (ref 136–145)
TRIGL SERPL-MCNC: 106 MG/DL
TSH SERPL DL<=0.05 MIU/L-ACNC: 3.22 UIU/ML (ref 0.36–3.74)
WBC # BLD AUTO: 11.27 THOUSAND/UL (ref 4.31–10.16)

## 2019-04-30 PROCEDURE — 80053 COMPREHEN METABOLIC PANEL: CPT

## 2019-04-30 PROCEDURE — 84443 ASSAY THYROID STIM HORMONE: CPT

## 2019-04-30 PROCEDURE — 83036 HEMOGLOBIN GLYCOSYLATED A1C: CPT

## 2019-04-30 PROCEDURE — 36415 COLL VENOUS BLD VENIPUNCTURE: CPT

## 2019-04-30 PROCEDURE — 80061 LIPID PANEL: CPT

## 2019-04-30 PROCEDURE — 85025 COMPLETE CBC W/AUTO DIFF WBC: CPT

## 2019-05-02 ENCOUNTER — OFFICE VISIT (OUTPATIENT)
Dept: INTERNAL MEDICINE CLINIC | Facility: CLINIC | Age: 59
End: 2019-05-02
Payer: COMMERCIAL

## 2019-05-02 VITALS
SYSTOLIC BLOOD PRESSURE: 108 MMHG | DIASTOLIC BLOOD PRESSURE: 60 MMHG | HEIGHT: 63 IN | HEART RATE: 95 BPM | TEMPERATURE: 98.8 F | WEIGHT: 225.9 LBS | BODY MASS INDEX: 40.03 KG/M2 | OXYGEN SATURATION: 95 %

## 2019-05-02 DIAGNOSIS — E11.40 TYPE 2 DIABETES MELLITUS WITH DIABETIC NEUROPATHY, WITHOUT LONG-TERM CURRENT USE OF INSULIN (HCC): Primary | ICD-10-CM

## 2019-05-02 DIAGNOSIS — E66.01 MORBID OBESITY WITH BMI OF 40.0-44.9, ADULT (HCC): ICD-10-CM

## 2019-05-02 DIAGNOSIS — I10 BENIGN ESSENTIAL HYPERTENSION: ICD-10-CM

## 2019-05-02 DIAGNOSIS — Z72.0 TOBACCO ABUSE: ICD-10-CM

## 2019-05-02 DIAGNOSIS — Z12.39 SCREENING FOR BREAST CANCER: ICD-10-CM

## 2019-05-02 DIAGNOSIS — Z11.59 NEED FOR HEPATITIS C SCREENING TEST: ICD-10-CM

## 2019-05-02 DIAGNOSIS — E78.5 DYSLIPIDEMIA: ICD-10-CM

## 2019-05-02 DIAGNOSIS — Z12.11 SCREENING FOR COLON CANCER: ICD-10-CM

## 2019-05-02 DIAGNOSIS — Z12.4 SCREENING FOR CERVICAL CANCER: ICD-10-CM

## 2019-05-02 PROBLEM — J01.10 ACUTE NON-RECURRENT FRONTAL SINUSITIS: Status: RESOLVED | Noted: 2019-04-03 | Resolved: 2019-05-02

## 2019-05-02 PROCEDURE — 3074F SYST BP LT 130 MM HG: CPT | Performed by: NURSE PRACTITIONER

## 2019-05-02 PROCEDURE — 3078F DIAST BP <80 MM HG: CPT | Performed by: NURSE PRACTITIONER

## 2019-05-02 PROCEDURE — 99214 OFFICE O/P EST MOD 30 MIN: CPT | Performed by: NURSE PRACTITIONER

## 2019-05-02 RX ORDER — MINOXIDIL 50 MG/G
AEROSOL, FOAM TOPICAL
Refills: 0 | COMMUNITY
Start: 2019-04-30 | End: 2022-01-03 | Stop reason: ALTCHOICE

## 2019-05-23 DIAGNOSIS — I10 ESSENTIAL HYPERTENSION: ICD-10-CM

## 2019-05-23 PROCEDURE — 4010F ACE/ARB THERAPY RXD/TAKEN: CPT | Performed by: NURSE PRACTITIONER

## 2019-05-23 RX ORDER — LOSARTAN POTASSIUM 100 MG/1
TABLET ORAL
Qty: 90 TABLET | Refills: 3 | Status: SHIPPED | OUTPATIENT
Start: 2019-05-23 | End: 2020-03-02

## 2019-05-25 DIAGNOSIS — E11.00 TYPE 2 DIABETES MELLITUS WITH HYPEROSMOLARITY WITHOUT COMA, WITHOUT LONG-TERM CURRENT USE OF INSULIN (HCC): ICD-10-CM

## 2019-06-06 ENCOUNTER — OFFICE VISIT (OUTPATIENT)
Dept: CARDIOLOGY CLINIC | Facility: HOSPITAL | Age: 59
End: 2019-06-06
Payer: COMMERCIAL

## 2019-06-06 VITALS
WEIGHT: 221.4 LBS | DIASTOLIC BLOOD PRESSURE: 80 MMHG | HEIGHT: 63 IN | SYSTOLIC BLOOD PRESSURE: 142 MMHG | BODY MASS INDEX: 39.23 KG/M2

## 2019-06-06 DIAGNOSIS — I10 BENIGN ESSENTIAL HYPERTENSION: ICD-10-CM

## 2019-06-06 DIAGNOSIS — Z95.5 PRESENCE OF DRUG-ELUTING STENT IN RIGHT CORONARY ARTERY: ICD-10-CM

## 2019-06-06 DIAGNOSIS — I44.7 LEFT BUNDLE BRANCH BLOCK (LBBB): ICD-10-CM

## 2019-06-06 DIAGNOSIS — E78.5 DYSLIPIDEMIA: ICD-10-CM

## 2019-06-06 DIAGNOSIS — I25.10 ATHEROSCLEROSIS OF NATIVE CORONARY ARTERY OF NATIVE HEART WITHOUT ANGINA PECTORIS: Primary | ICD-10-CM

## 2019-06-06 PROCEDURE — 99214 OFFICE O/P EST MOD 30 MIN: CPT | Performed by: INTERNAL MEDICINE

## 2019-06-06 PROCEDURE — 93000 ELECTROCARDIOGRAM COMPLETE: CPT | Performed by: INTERNAL MEDICINE

## 2019-06-06 RX ORDER — CARVEDILOL 12.5 MG/1
12.5 TABLET ORAL 2 TIMES DAILY WITH MEALS
Qty: 180 TABLET | Refills: 3 | Status: SHIPPED | OUTPATIENT
Start: 2019-06-06 | End: 2020-05-08

## 2019-06-06 RX ORDER — NITROGLYCERIN 0.4 MG/1
0.4 TABLET SUBLINGUAL
Qty: 30 TABLET | Refills: 3 | Status: SHIPPED | OUTPATIENT
Start: 2019-06-06

## 2019-06-15 DIAGNOSIS — E78.5 DYSLIPIDEMIA: ICD-10-CM

## 2019-06-17 RX ORDER — ROSUVASTATIN CALCIUM 40 MG/1
TABLET, COATED ORAL
Qty: 90 TABLET | Refills: 3 | Status: SHIPPED | OUTPATIENT
Start: 2019-06-17 | End: 2020-05-08

## 2019-06-22 DIAGNOSIS — E13.9 DIABETES 1.5, MANAGED AS TYPE 2 (HCC): ICD-10-CM

## 2019-06-22 RX ORDER — SITAGLIPTIN 100 MG/1
TABLET, FILM COATED ORAL
Qty: 90 TABLET | Refills: 3 | Status: SHIPPED | OUTPATIENT
Start: 2019-06-22 | End: 2020-07-17 | Stop reason: SDUPTHER

## 2019-07-05 DIAGNOSIS — E11.00 TYPE 2 DIABETES MELLITUS WITH HYPEROSMOLARITY WITHOUT COMA, WITHOUT LONG-TERM CURRENT USE OF INSULIN (HCC): ICD-10-CM

## 2019-07-05 RX ORDER — GLIPIZIDE 5 MG/1
TABLET ORAL
Qty: 30 TABLET | Refills: 3 | Status: SHIPPED | OUTPATIENT
Start: 2019-07-05 | End: 2019-11-06 | Stop reason: SDUPTHER

## 2019-08-14 DIAGNOSIS — H57.89 EYE DRAINAGE: ICD-10-CM

## 2019-08-14 DIAGNOSIS — H04.129 DRY EYE: ICD-10-CM

## 2019-08-14 RX ORDER — CETIRIZINE HYDROCHLORIDE 10 MG/1
TABLET ORAL
Qty: 30 TABLET | Refills: 3 | Status: SHIPPED | OUTPATIENT
Start: 2019-08-14 | End: 2019-12-24 | Stop reason: SDUPTHER

## 2019-09-26 DIAGNOSIS — E11.00 TYPE 2 DIABETES MELLITUS WITH HYPEROSMOLARITY WITHOUT COMA, WITHOUT LONG-TERM CURRENT USE OF INSULIN (HCC): ICD-10-CM

## 2019-09-26 NOTE — TELEPHONE ENCOUNTER
I called and spoke to patient  She states that she takes metformin 500mg tabs - 2 pills in the morning and 1 pill in the evening

## 2019-10-10 DIAGNOSIS — I10 ESSENTIAL HYPERTENSION: ICD-10-CM

## 2019-10-25 DIAGNOSIS — E13.9 DIABETES 1.5, MANAGED AS TYPE 2 (HCC): ICD-10-CM

## 2019-10-25 RX ORDER — BLOOD SUGAR DIAGNOSTIC
STRIP MISCELLANEOUS
Qty: 100 EACH | Refills: 3 | Status: SHIPPED | OUTPATIENT
Start: 2019-10-25 | End: 2020-05-04

## 2019-10-30 ENCOUNTER — APPOINTMENT (OUTPATIENT)
Dept: LAB | Facility: HOSPITAL | Age: 59
End: 2019-10-30
Payer: COMMERCIAL

## 2019-10-30 DIAGNOSIS — E78.5 DYSLIPIDEMIA: ICD-10-CM

## 2019-10-30 DIAGNOSIS — Z72.0 TOBACCO ABUSE: ICD-10-CM

## 2019-10-30 DIAGNOSIS — Z12.39 SCREENING FOR BREAST CANCER: ICD-10-CM

## 2019-10-30 DIAGNOSIS — Z11.59 NEED FOR HEPATITIS C SCREENING TEST: ICD-10-CM

## 2019-10-30 DIAGNOSIS — E11.40 TYPE 2 DIABETES MELLITUS WITH DIABETIC NEUROPATHY, WITHOUT LONG-TERM CURRENT USE OF INSULIN (HCC): ICD-10-CM

## 2019-10-30 DIAGNOSIS — I10 BENIGN ESSENTIAL HYPERTENSION: ICD-10-CM

## 2019-10-30 LAB
ALBUMIN SERPL BCP-MCNC: 3.6 G/DL (ref 3.5–5)
ALP SERPL-CCNC: 118 U/L (ref 46–116)
ALT SERPL W P-5'-P-CCNC: 30 U/L (ref 12–78)
ANION GAP SERPL CALCULATED.3IONS-SCNC: 10 MMOL/L (ref 4–13)
AST SERPL W P-5'-P-CCNC: 17 U/L (ref 5–45)
BASOPHILS # BLD AUTO: 0.09 THOUSANDS/ΜL (ref 0–0.1)
BASOPHILS NFR BLD AUTO: 1 % (ref 0–1)
BILIRUB SERPL-MCNC: 0.2 MG/DL (ref 0.2–1)
BUN SERPL-MCNC: 11 MG/DL (ref 5–25)
CALCIUM SERPL-MCNC: 8.4 MG/DL (ref 8.3–10.1)
CHLORIDE SERPL-SCNC: 103 MMOL/L (ref 100–108)
CHOLEST SERPL-MCNC: 96 MG/DL (ref 50–200)
CO2 SERPL-SCNC: 23 MMOL/L (ref 21–32)
CREAT SERPL-MCNC: 1.01 MG/DL (ref 0.6–1.3)
EOSINOPHIL # BLD AUTO: 0.32 THOUSAND/ΜL (ref 0–0.61)
EOSINOPHIL NFR BLD AUTO: 2 % (ref 0–6)
ERYTHROCYTE [DISTWIDTH] IN BLOOD BY AUTOMATED COUNT: 15.5 % (ref 11.6–15.1)
EST. AVERAGE GLUCOSE BLD GHB EST-MCNC: 186 MG/DL
GFR SERPL CREATININE-BSD FRML MDRD: 61 ML/MIN/1.73SQ M
GLUCOSE P FAST SERPL-MCNC: 172 MG/DL (ref 65–99)
HBA1C MFR BLD: 8.1 % (ref 4.2–6.3)
HCT VFR BLD AUTO: 42.9 % (ref 34.8–46.1)
HCV AB SER QL: NORMAL
HDLC SERPL-MCNC: 39 MG/DL
HGB BLD-MCNC: 13.6 G/DL (ref 11.5–15.4)
IMM GRANULOCYTES # BLD AUTO: 0.06 THOUSAND/UL (ref 0–0.2)
IMM GRANULOCYTES NFR BLD AUTO: 0 % (ref 0–2)
LDLC SERPL CALC-MCNC: 34 MG/DL (ref 0–100)
LYMPHOCYTES # BLD AUTO: 3.5 THOUSANDS/ΜL (ref 0.6–4.47)
LYMPHOCYTES NFR BLD AUTO: 26 % (ref 14–44)
MCH RBC QN AUTO: 28.7 PG (ref 26.8–34.3)
MCHC RBC AUTO-ENTMCNC: 31.7 G/DL (ref 31.4–37.4)
MCV RBC AUTO: 91 FL (ref 82–98)
MONOCYTES # BLD AUTO: 0.99 THOUSAND/ΜL (ref 0.17–1.22)
MONOCYTES NFR BLD AUTO: 7 % (ref 4–12)
NEUTROPHILS # BLD AUTO: 8.6 THOUSANDS/ΜL (ref 1.85–7.62)
NEUTS SEG NFR BLD AUTO: 64 % (ref 43–75)
NONHDLC SERPL-MCNC: 57 MG/DL
NRBC BLD AUTO-RTO: 0 /100 WBCS
PLATELET # BLD AUTO: 267 THOUSANDS/UL (ref 149–390)
PMV BLD AUTO: 9.9 FL (ref 8.9–12.7)
POTASSIUM SERPL-SCNC: 4 MMOL/L (ref 3.5–5.3)
PROT SERPL-MCNC: 7.5 G/DL (ref 6.4–8.2)
RBC # BLD AUTO: 4.74 MILLION/UL (ref 3.81–5.12)
SODIUM SERPL-SCNC: 136 MMOL/L (ref 136–145)
T4 FREE SERPL-MCNC: 1.03 NG/DL (ref 0.76–1.46)
TRIGL SERPL-MCNC: 113 MG/DL
TSH SERPL DL<=0.05 MIU/L-ACNC: 3.99 UIU/ML (ref 0.36–3.74)
WBC # BLD AUTO: 13.56 THOUSAND/UL (ref 4.31–10.16)

## 2019-10-30 PROCEDURE — 36415 COLL VENOUS BLD VENIPUNCTURE: CPT

## 2019-10-30 PROCEDURE — 85025 COMPLETE CBC W/AUTO DIFF WBC: CPT

## 2019-10-30 PROCEDURE — 80061 LIPID PANEL: CPT

## 2019-10-30 PROCEDURE — 84439 ASSAY OF FREE THYROXINE: CPT

## 2019-10-30 PROCEDURE — 84443 ASSAY THYROID STIM HORMONE: CPT

## 2019-10-30 PROCEDURE — 86803 HEPATITIS C AB TEST: CPT

## 2019-10-30 PROCEDURE — 80053 COMPREHEN METABOLIC PANEL: CPT

## 2019-10-30 PROCEDURE — 83036 HEMOGLOBIN GLYCOSYLATED A1C: CPT

## 2019-11-04 ENCOUNTER — OFFICE VISIT (OUTPATIENT)
Dept: INTERNAL MEDICINE CLINIC | Facility: CLINIC | Age: 59
End: 2019-11-04
Payer: COMMERCIAL

## 2019-11-04 ENCOUNTER — HOSPITAL ENCOUNTER (OUTPATIENT)
Dept: RADIOLOGY | Facility: HOSPITAL | Age: 59
Discharge: HOME | End: 2019-11-04
Payer: COMMERCIAL

## 2019-11-04 VITALS
TEMPERATURE: 99.1 F | HEIGHT: 63 IN | HEART RATE: 96 BPM | BODY MASS INDEX: 39.78 KG/M2 | OXYGEN SATURATION: 96 % | DIASTOLIC BLOOD PRESSURE: 64 MMHG | WEIGHT: 224.5 LBS | SYSTOLIC BLOOD PRESSURE: 122 MMHG | RESPIRATION RATE: 14 BRPM

## 2019-11-04 DIAGNOSIS — Z23 NEED FOR PNEUMOCOCCAL VACCINATION: ICD-10-CM

## 2019-11-04 DIAGNOSIS — I44.7 LEFT BUNDLE BRANCH BLOCK (LBBB): ICD-10-CM

## 2019-11-04 DIAGNOSIS — Z12.11 SCREENING FOR COLON CANCER: ICD-10-CM

## 2019-11-04 DIAGNOSIS — Z78.0 POSTMENOPAUSAL: ICD-10-CM

## 2019-11-04 DIAGNOSIS — R79.89 ELEVATED TSH: ICD-10-CM

## 2019-11-04 DIAGNOSIS — M25.561 ACUTE PAIN OF RIGHT KNEE: ICD-10-CM

## 2019-11-04 DIAGNOSIS — I10 BENIGN ESSENTIAL HYPERTENSION: ICD-10-CM

## 2019-11-04 DIAGNOSIS — Z95.5 PRESENCE OF DRUG-ELUTING STENT IN RIGHT CORONARY ARTERY: ICD-10-CM

## 2019-11-04 DIAGNOSIS — Z12.39 SCREENING FOR BREAST CANCER: ICD-10-CM

## 2019-11-04 DIAGNOSIS — E11.9 TYPE 2 DIABETES MELLITUS WITHOUT COMPLICATION, WITHOUT LONG-TERM CURRENT USE OF INSULIN (HCC): Primary | ICD-10-CM

## 2019-11-04 PROBLEM — Z11.59 NEED FOR HEPATITIS C SCREENING TEST: Status: RESOLVED | Noted: 2019-05-02 | Resolved: 2019-11-04

## 2019-11-04 LAB
CREAT UR-MCNC: 186 MG/DL
MICROALBUMIN UR-MCNC: 242 MG/L (ref 0–20)
MICROALBUMIN/CREAT 24H UR: 130 MG/G CREATININE (ref 0–30)

## 2019-11-04 PROCEDURE — 90670 PCV13 VACCINE IM: CPT | Performed by: NURSE PRACTITIONER

## 2019-11-04 PROCEDURE — 82570 ASSAY OF URINE CREATININE: CPT | Performed by: NURSE PRACTITIONER

## 2019-11-04 PROCEDURE — 73562 X-RAY EXAM OF KNEE 3: CPT

## 2019-11-04 PROCEDURE — 82043 UR ALBUMIN QUANTITATIVE: CPT | Performed by: NURSE PRACTITIONER

## 2019-11-04 PROCEDURE — 99214 OFFICE O/P EST MOD 30 MIN: CPT | Performed by: NURSE PRACTITIONER

## 2019-11-04 PROCEDURE — 90471 IMMUNIZATION ADMIN: CPT | Performed by: NURSE PRACTITIONER

## 2019-11-04 RX ORDER — IBUPROFEN 800 MG/1
800 TABLET ORAL EVERY 8 HOURS PRN
Qty: 90 TABLET | Refills: 0 | Status: SHIPPED | OUTPATIENT
Start: 2019-11-04 | End: 2022-01-03 | Stop reason: ALTCHOICE

## 2019-11-04 NOTE — PROGRESS NOTES
Assessment/Plan:Patients A1C is 8 1 and she is taking Metformin, Januvia, and Glipizide  I did advise her to check her sugars more regular and if her A1C and will increase her Metformin to 1000 mg BID   She did follow up with Cardiology and was started on Crestor and her Zocor was discontinued   Her Lopressor was stopped and was started on Coreg and feels her sugars did go up since starting on this    She has followed up with Hematology for her elevated WBC and is following back up with them in one year  Doni Mariano is going for her mammogram and GYN exam coming up  Referrals were printed out again for patient   Foot exam normal in the office   She will get Cologuard done  Dinesh Pizano try and get Cologuard approved for her  She is up to date on her vaccines   Will obtain [employer] notes  She is up to date on eye exams  Will give Prevnar today  Will obtain XR of the right knee  Will repeat A1C in 3 months and will repeat TSH in 6 weeks  She was advised if any issues to call the office  No problem-specific Assessment & Plan notes found for this encounter           Problem List Items Addressed This Visit        Endocrine    Type 2 diabetes mellitus without complication, without long-term current use of insulin ([facility] [address] ) - Primary    Relevant Orders    Diabetic foot exam    Microalbumin / creatinine urine ratio    HEMOGLOBIN A1C W/ EAG ESTIMATION       Cardiovascular and Mediastinum    Benign essential hypertension    Left bundle branch block (LBBB)       Other    Screening for breast cancer    Relevant Orders    Mammo screening bilateral w 3d & cad    Screening for colon cancer    Relevant Orders    Cologuard    Acute pain of right knee    Relevant Medications    ibuprofen (MOTRIN) 800 mg tablet    Other Relevant Orders    XR knee 3 vw right non injury    RESOLVED: Presence of drug-eluting stent in right coronary artery      Other Visit Diagnoses     Elevated TSH        Relevant Orders    TSH, 3rd generation with Free T4 reflex    Postmenopausal        Relevant Orders    DXA bone density spine hip and pelvis    Need for pneumococcal vaccination        Relevant Orders    PNEUMOCOCCAL CONJUGATE VACCINE 13-VALENT GREATER THAN 6 MONTHS (Completed)            Subjective:      Patient ID: Constantin Pond is a 61 y o  female  Roe White is here today for a follow up visit  She states since starting her on her Coreg her sugars have been high  Her BP has been better but now her sugars are running high  She states she will let Cardiology know if they do not come down  She is having issues with her right knee having pain and feels her knee is locking when ambulating  She states she is having some swelling and it is getting worse  She denies any chest pain, SOB, or palpitations  She did have the Flu and Shingrix at [employer]  She would like the Prevnar vaccine  She states she will be due for her screenings and Cologuard  She does go to [facility] for eye exams  She offers no other issues  The following portions of the patient's history were reviewed and updated as appropriate:   She  has a past medical history of Cardiac disease, Chronic neck pain, Diabetes mellitus ([address] ), Hyperlipidemia, Hypertension, Left bundle branch block, Mild depression ([address] ), and Tonsillar hypertrophy    She   Patient Active Problem List    Diagnosis Date Noted    Screening for breast cancer 11/04/2019    Screening for colon cancer 11/04/2019    Acute pain of right knee 11/04/2019    Dry eye 04/03/2019    Eye drainage 04/03/2019    Left bundle branch block (LBBB) 06/05/2018    Tobacco abuse 06/05/2018    Leukocytosis 04/24/2018    Type 2 diabetes mellitus without complication, without long-term current use of insulin ([address] ) 02/28/2017    Obesity 08/09/2013    Anxiety 06/03/2013    Atherosclerotic heart disease of native coronary artery without angina pectoris 06/03/2013    Dyslipidemia 06/03/2013    Benign essential hypertension 05/13/2013     She  has a past surgical history that includes Cardiac surgery and Coronary angioplasty with stent (05/28/2013)  Her family history includes [place]'s disease in her brother; Alcohol abuse in her brother; Arthritis in her father and mother; Bipolar disorder in her brother; Breast cancer in her cousin and maternal aunt; Cardiomyopathy in her mother; Cerebral aneurysm in her brother; Colon cancer (age of onset: 66) in her mother; Coronary artery disease in her father and mother; Diabetes type II in her father and maternal uncle; Fibrocystic breast disease in her mother; Gout in her father; Hyperlipidemia in her father; Hypertension in her mother and sister; Hypothyroidism in her sister; Kidney disease in her father; Lung cancer in her maternal uncle; Other in her brother and sister; Peripheral vascular disease in her maternal aunt; Prostate cancer in her father; Rheum arthritis in her sister; Stroke in her father; Thrombocytopenia in her daughter  She  reports that she has been smoking cigarettes  She has a 10 00 pack-year smoking history  She has never used smokeless tobacco  She reports that she does not drink alcohol or use drugs    Current Outpatient Medications   Medication Sig Dispense Refill    ACCU-CHEK TSERING PLUS test strip TEST twice a day 100 each 3    ACCU-CHEK SOFTCLIX LANCETS lancets TEST twice a day 100 each 3    ALPRAZolam (XANAX) 0 25 mg tablet Take 0 25 mg by mouth daily at bedtime as needed for anxiety      aspirin 325 mg tablet Take 325 mg by mouth daily      carvedilol (COREG) 12 5 mg tablet Take 1 tablet (12 5 mg total) by mouth 2 (two) times a day with meals 180 tablet 3    cetirizine (ZyrTEC) 10 mg tablet take 1 tablet by mouth once daily 30 tablet 3    Cholecalciferol (VITAMIN D3 PO) Take 2,000 Units by mouth 2 (two) times a day      cyanocobalamin 2000 MCG tablet Take 2,000 mcg by mouth daily      fluticasone (FLONASE) 50 mcg/act nasal spray 1 spray into each nostril daily 1 Bottle 5    glipiZIDE (GLUCOTROL) 5 mg tablet take 1 tablet by mouth daily 30 tablet 3    JANUVIA 100 MG tablet take 1 tablet by mouth once daily 90 tablet 3    losartan (COZAAR) 100 MG tablet take 1 tablet by mouth once daily 90 tablet 3    metFORMIN (GLUCOPHAGE) 500 mg tablet Take 2 tablets in the am and one tablet in the even by mouth 90 tablet 3    Multiple Vitamin-Folic Acid TABS Take by mouth      nitroglycerin (NITROSTAT) 0 4 mg SL tablet Place 1 tablet (0 4 mg total) under the tongue every 5 (five) minutes as needed for chest pain 30 tablet 3    RA EYE ITCH RELIEF 0 025 % ophthalmic solution place 1 drop into both eyes twice a day  0    rosuvastatin (CRESTOR) 40 MG tablet take 1 tablet by mouth once daily 90 tablet 3    ibuprofen (MOTRIN) 800 mg tablet Take 1 tablet (800 mg total) by mouth every 8 (eight) hours as needed for mild pain 90 tablet 0    nicotine (NICODERM CQ) 14 mg/24hr TD 24 hr patch Place 1 patch on the skin every 24 hours (Patient not taking: Reported on 5/2/2019) 28 patch 0     No current facility-administered medications for this visit        Current Outpatient Medications on File Prior to Visit   Medication Sig    ACCU-CHEK TSERING PLUS test strip TEST twice a day    ACCU-CHEK SOFTCLIX LANCETS lancets TEST twice a day    ALPRAZolam (XANAX) 0 25 mg tablet Take 0 25 mg by mouth daily at bedtime as needed for anxiety    aspirin 325 mg tablet Take 325 mg by mouth daily    carvedilol (COREG) 12 5 mg tablet Take 1 tablet (12 5 mg total) by mouth 2 (two) times a day with meals    cetirizine (ZyrTEC) 10 mg tablet take 1 tablet by mouth once daily    Cholecalciferol (VITAMIN D3 PO) Take 2,000 Units by mouth 2 (two) times a day    cyanocobalamin 2000 MCG tablet Take 2,000 mcg by mouth daily    fluticasone (FLONASE) 50 mcg/act nasal spray 1 spray into each nostril daily    glipiZIDE (GLUCOTROL) 5 mg tablet take 1 tablet by mouth daily    JANUVIA 100 MG tablet take 1 tablet by mouth once daily  losartan (COZAAR) 100 MG tablet take 1 tablet by mouth once daily    metFORMIN (GLUCOPHAGE) 500 mg tablet Take 2 tablets in the am and one tablet in the even by mouth    Multiple Vitamin-Folic Acid TABS Take by mouth    nitroglycerin (NITROSTAT) 0 4 mg SL tablet Place 1 tablet (0 4 mg total) under the tongue every 5 (five) minutes as needed for chest pain    RA EYE ITCH RELIEF 0 025 % ophthalmic solution place 1 drop into both eyes twice a day    rosuvastatin (CRESTOR) 40 MG tablet take 1 tablet by mouth once daily    nicotine (NICODERM CQ) 14 mg/24hr TD 24 hr patch Place 1 patch on the skin every 24 hours (Patient not taking: Reported on 5/2/2019)    [DISCONTINUED] metoprolol tartrate (LOPRESSOR) 25 mg tablet take 1 tablet by mouth every 12 hours (Patient not taking: Reported on 11/4/2019)    [DISCONTINUED] nitroglycerin (NITROSTAT) 0 4 mg SL tablet Place 0 4 mg under the tongue every 5 (five) minutes as needed for chest pain     No current facility-administered medications on file prior to visit  She is allergic to hctz [hydrochlorothiazide]; wellbutrin [bupropion]; and ace inhibitors       Review of Systems   Constitutional: Negative  HENT: Negative  Eyes: Negative  Respiratory: Negative  Cardiovascular: Negative  Gastrointestinal: Negative  Endocrine: Negative  Genitourinary: Negative  Musculoskeletal: Negative  Right knee pain   Skin: Negative  Allergic/Immunologic: Negative  Neurological: Negative  Hematological: Negative  Psychiatric/Behavioral: Negative  Patient's shoes and socks removed  Right Foot/Ankle   Right Foot Inspection  Skin Exam: skin normal and skin intact no dry skin, no warmth, no callus, no erythema, no maceration, no abnormal color, no pre-ulcer, no ulcer and no callus                          Toe Exam: ROM and strength within normal limits  Sensory   Vibration: intact  Proprioception: intact   Monofilament testing: intact  Vascular  Capillary refills: < 3 seconds  The right DP pulse is 2+  The right PT pulse is 2+  Left Foot/Ankle  Left Foot Inspection  Skin Exam: skin normal and skin intactno dry skin, no warmth, no erythema, no maceration, normal color, no pre-ulcer, no ulcer and no callus                         Toe Exam: ROM and strength within normal limits                   Sensory   Vibration: intact  Proprioception: intact  Monofilament: intact  Vascular  Capillary refills: < 3 seconds  The left DP pulse is 2+  The left PT pulse is 2+  Assign Risk Category:  No deformity present; No loss of protective sensation; No weak pulses       Risk: 0      Below is the patient's most recent value for Albumin, ALT, AST, BUN, Calcium, Chloride, Cholesterol, CO2, Creatinine, GFR, Glucose, HDL, Hematocrit, Hemoglobin, Hemoglobin A1C, LDL, Magnesium, Phosphorus, Platelets, Potassium, PSA, Sodium, Triglycerides, and WBC  Lab Results   Component Value Date    ALT 30 10/30/2019    AST 17 10/30/2019    BUN 11 10/30/2019    CALCIUM 8 4 10/30/2019     10/30/2019    CO2 23 10/30/2019    CREATININE 1 01 10/30/2019    HDL 39 (L) 10/30/2019    HCT 42 9 10/30/2019    HGB 13 6 10/30/2019    HGBA1C 8 1 (H) 10/30/2019     10/30/2019    K 4 0 10/30/2019    TRIG 113 10/30/2019    WBC 13 56 (H) 10/30/2019     Note: for a comprehensive list of the patient's lab results, access the Results Review activity  Objective:      /64 (BP Location: Right arm, Patient Position: Sitting, Cuff Size: Large)   Pulse 96   Temp 99 1 °F (37 3 °C) (Temporal)   Resp 14   Ht 5' 3" (1 6 m)   Wt 102 kg (224 lb 8 oz)   SpO2 96%   BMI 39 77 kg/m²          Physical Exam   Constitutional: She is oriented to person, place, and time  She appears well-developed and well-nourished  HENT:   Head: Normocephalic and atraumatic     Right Ear: External ear normal    Left Ear: External ear normal    Nose: Nose normal    Mouth/Throat: Oropharynx is clear and moist    Eyes: Pupils are equal, round, and reactive to light  Conjunctivae and EOM are normal    Neck: Normal range of motion  Neck supple  Cardiovascular: Normal rate, regular rhythm, normal heart sounds and intact distal pulses  Pulses are no weak pulses  Pulses:       Dorsalis pedis pulses are 2+ on the right side, and 2+ on the left side  Posterior tibial pulses are 2+ on the right side, and 2+ on the left side  Pulmonary/Chest: Effort normal and breath sounds normal    Abdominal: Soft  Bowel sounds are normal    Musculoskeletal: Normal range of motion  She exhibits tenderness  Feet:    Right knee   Feet:   Right Foot:   Skin Integrity: Negative for ulcer, skin breakdown, erythema, warmth, callus or dry skin  Left Foot:   Skin Integrity: Negative for ulcer, skin breakdown, erythema, warmth, callus or dry skin  Neurological: She is alert and oriented to person, place, and time  Skin: Skin is warm and dry  Capillary refill takes less than 2 seconds  Psychiatric: She has a normal mood and affect  Her behavior is normal  Judgment and thought content normal    Vitals reviewed

## 2019-11-06 DIAGNOSIS — E11.00 TYPE 2 DIABETES MELLITUS WITH HYPEROSMOLARITY WITHOUT COMA, WITHOUT LONG-TERM CURRENT USE OF INSULIN (HCC): ICD-10-CM

## 2019-11-06 RX ORDER — GLIPIZIDE 5 MG/1
5 TABLET ORAL DAILY
Qty: 30 TABLET | Refills: 3 | Status: SHIPPED | OUTPATIENT
Start: 2019-11-06 | End: 2020-03-02 | Stop reason: SDUPTHER

## 2019-11-26 ENCOUNTER — APPOINTMENT (EMERGENCY)
Dept: RADIOLOGY | Facility: HOSPITAL | Age: 59
End: 2019-11-26
Payer: COMMERCIAL

## 2019-11-26 ENCOUNTER — HOSPITAL ENCOUNTER (EMERGENCY)
Facility: HOSPITAL | Age: 59
Discharge: HOME | End: 2019-11-26
Attending: EMERGENCY MEDICINE | Admitting: EMERGENCY MEDICINE
Payer: COMMERCIAL

## 2019-11-26 VITALS
SYSTOLIC BLOOD PRESSURE: 150 MMHG | OXYGEN SATURATION: 96 % | HEIGHT: 63 IN | RESPIRATION RATE: 20 BRPM | HEART RATE: 90 BPM | DIASTOLIC BLOOD PRESSURE: 66 MMHG | BODY MASS INDEX: 39.77 KG/M2 | TEMPERATURE: 98.2 F

## 2019-11-26 DIAGNOSIS — M25.561 ACUTE PAIN OF RIGHT KNEE: Primary | ICD-10-CM

## 2019-11-26 PROCEDURE — 99284 EMERGENCY DEPT VISIT MOD MDM: CPT | Performed by: EMERGENCY MEDICINE

## 2019-11-26 PROCEDURE — 73562 X-RAY EXAM OF KNEE 3: CPT

## 2019-11-26 PROCEDURE — 99283 EMERGENCY DEPT VISIT LOW MDM: CPT

## 2019-11-26 RX ORDER — OXYCODONE HYDROCHLORIDE AND ACETAMINOPHEN 5; 325 MG/1; MG/1
1 TABLET ORAL EVERY 4 HOURS PRN
Qty: 10 TABLET | Refills: 0 | Status: SHIPPED | OUTPATIENT
Start: 2019-11-26 | End: 2019-12-06

## 2019-11-26 RX ORDER — NAPROXEN 500 MG/1
500 TABLET ORAL ONCE
Status: COMPLETED | OUTPATIENT
Start: 2019-11-26 | End: 2019-11-26

## 2019-11-26 RX ORDER — OXYCODONE HYDROCHLORIDE AND ACETAMINOPHEN 5; 325 MG/1; MG/1
1 TABLET ORAL ONCE
Status: COMPLETED | OUTPATIENT
Start: 2019-11-26 | End: 2019-11-26

## 2019-11-26 RX ORDER — ONDANSETRON 4 MG/1
4 TABLET, ORALLY DISINTEGRATING ORAL ONCE
Status: COMPLETED | OUTPATIENT
Start: 2019-11-26 | End: 2019-11-26

## 2019-11-26 RX ORDER — NAPROXEN 500 MG/1
500 TABLET ORAL 2 TIMES DAILY WITH MEALS
Qty: 8 TABLET | Refills: 0 | Status: SHIPPED | OUTPATIENT
Start: 2019-11-26 | End: 2021-05-26 | Stop reason: ALTCHOICE

## 2019-11-26 RX ORDER — ONDANSETRON 4 MG/1
4 TABLET, FILM COATED ORAL EVERY 8 HOURS PRN
Qty: 30 TABLET | Refills: 0 | Status: SHIPPED | OUTPATIENT
Start: 2019-11-26 | End: 2020-05-04

## 2019-11-26 RX ADMIN — ONDANSETRON 4 MG: 4 TABLET, ORALLY DISINTEGRATING ORAL at 21:53

## 2019-11-26 RX ADMIN — OXYCODONE HYDROCHLORIDE AND ACETAMINOPHEN 1 TABLET: 5; 325 TABLET ORAL at 21:53

## 2019-11-26 RX ADMIN — NAPROXEN 500 MG: 500 TABLET ORAL at 21:53

## 2019-11-27 NOTE — ED PROVIDER NOTES
History  Chief Complaint   Patient presents with    Knee Pain     right knee hurts more than usual, gave out today, has been having problems with right knee for a couple months     HPI    Pt presents from home, hx of chronic right knee pain that began several weeks ago after "my knee buckled when I was walking down the steps, and it's never been the same since "  She states today she was ambulating, went to walk up the steps, and she had acute pain in which she was unable to bend or put weight on the knee  She denies falling or pain anywhere else  She feels that her knee is swollen  She has decreased ROM of the knee due to pain  She has not taken any medications for the knee  Prior to Admission Medications   Prescriptions Last Dose Informant Patient Reported? Taking?    ACCU-CHEK TSERING PLUS test strip  Self No No   Sig: TEST twice a day   ACCU-CHEK SOFTCLIX LANCETS lancets  Self No No   Sig: TEST twice a day   ALPRAZolam (XANAX) 0 25 mg tablet  Self Yes No   Sig: Take 0 25 mg by mouth daily at bedtime as needed for anxiety   Cholecalciferol (VITAMIN D3 PO)  Self Yes No   Sig: Take 2,000 Units by mouth 2 (two) times a day   JANUVIA 100 MG tablet  Self No No   Sig: take 1 tablet by mouth once daily   Multiple Vitamin-Folic Acid TABS  Self Yes No   Sig: Take by mouth   RA EYE ITCH RELIEF 0 025 % ophthalmic solution  Self Yes No   Sig: place 1 drop into both eyes twice a day   aspirin 325 mg tablet  Self Yes No   Sig: Take 325 mg by mouth daily   carvedilol (COREG) 12 5 mg tablet  Self No No   Sig: Take 1 tablet (12 5 mg total) by mouth 2 (two) times a day with meals   cetirizine (ZyrTEC) 10 mg tablet  Self No No   Sig: take 1 tablet by mouth once daily   cyanocobalamin 2000 MCG tablet  Self Yes No   Sig: Take 2,000 mcg by mouth daily   fluticasone (FLONASE) 50 mcg/act nasal spray  Self No No   Si spray into each nostril daily   glipiZIDE (GLUCOTROL) 5 mg tablet   No No   Sig: Take 1 tablet (5 mg total) by mouth daily   ibuprofen (MOTRIN) 800 mg tablet   No No   Sig: Take 1 tablet (800 mg total) by mouth every 8 (eight) hours as needed for mild pain   losartan (COZAAR) 100 MG tablet  Self No No   Sig: take 1 tablet by mouth once daily   metFORMIN (GLUCOPHAGE) 500 mg tablet  Self No No   Sig: Take 2 tablets in the am and one tablet in the even by mouth   nicotine (NICODERM CQ) 14 mg/24hr TD 24 hr patch  Self No No   Sig: Place 1 patch on the skin every 24 hours   Patient not taking: Reported on 5/2/2019   nitroglycerin (NITROSTAT) 0 4 mg SL tablet  Self No No   Sig: Place 1 tablet (0 4 mg total) under the tongue every 5 (five) minutes as needed for chest pain   rosuvastatin (CRESTOR) 40 MG tablet  Self No No   Sig: take 1 tablet by mouth once daily      Facility-Administered Medications: None       Past Medical History:   Diagnosis Date    Cardiac disease     MI May 25, 2013    Chronic neck pain     Last Assessed:11/28/16    Diabetes mellitus ([facility] [address] [facility]     Hyperlipidemia     Hypertension     Left bundle branch block     Last Assessed:12/5/13    Mild depression (HCC)     Last Assessed:11/28/16    Tonsillar hypertrophy     Last Assessed:8/27/13       Past Surgical History:   Procedure Laterality Date    CARDIAC SURGERY      1 stent placed    CORONARY ANGIOPLASTY WITH STENT PLACEMENT  05/28/2013    per [employer]: Cath stent 1 type drug-eluting, mild RCA       Family History   Problem Relation Age of Onset    Arthritis Mother     Colon cancer Mother 66    Coronary artery disease Mother     Fibrocystic breast disease Mother     Hypertension Mother    Claude Godinez Cardiomyopathy Mother         Ischemic Dilated Cardiomyopathy    Arthritis Father     Kidney disease Father         Chronic (NKF classification)    Coronary artery disease Father     Gout Father     Prostate cancer Father     Hyperlipidemia Father         Pure Hypercholesterolemia    Stroke Father         Stroke Syndrome    Diabetes type II Father    Claude Oviedoose Hypertension Sister     Hypothyroidism Sister     Other Sister         IVP- Solitary Kidney Calculus    Rheum arthritis Sister     Alcohol abuse Brother     Bipolar disorder Brother         NOS    Other Brother         IVP- Solitary Kidney Calculus    Jeff Davis's disease Brother     Cerebral aneurysm Brother         Ruptured    Breast cancer Maternal Aunt     Peripheral vascular disease Maternal Aunt     Breast cancer Cousin         2 maternal cousins    Lung cancer Maternal Uncle     Diabetes type II Maternal Uncle     Thrombocytopenia Daughter         Idiopathic Thrombocytopenia Purpura at 10 yo     I have reviewed and agree with the history as documented  Social History     Tobacco Use    Smoking status: Current Every Day Smoker     Packs/day: 0 50     Years: 20 00     Pack years: 10 00     Types: Cigarettes    Smokeless tobacco: Never Used   Substance Use Topics    Alcohol use: No    Drug use: No     Comment: occassionally         Review of Systems   Constitutional: Negative for activity change, appetite change, diaphoresis, fatigue and fever  HENT: Negative for congestion, facial swelling, mouth sores and trouble swallowing  Eyes: Negative for photophobia, discharge and visual disturbance  Respiratory: Negative for apnea, cough, shortness of breath and wheezing  Cardiovascular: Negative for chest pain and leg swelling  Gastrointestinal: Negative for abdominal pain, constipation, diarrhea, nausea and vomiting  Endocrine: Negative for heat intolerance and polydipsia  Genitourinary: Negative for dysuria, flank pain, frequency and hematuria  Musculoskeletal: Positive for arthralgias and myalgias  Negative for back pain, gait problem and neck pain  Skin: Negative for rash and wound  Allergic/Immunologic: Negative for immunocompromised state  Neurological: Negative for dizziness, syncope, weakness, light-headedness and headaches  Hematological: Negative for adenopathy  Psychiatric/Behavioral: Negative for agitation, confusion and self-injury  The patient is not nervous/anxious  Physical Exam  Physical Exam   Constitutional: She is oriented to person, place, and time  She appears well-developed and well-nourished  No distress  HENT:   Head: Normocephalic and atraumatic  Right Ear: External ear normal    Left Ear: External ear normal    Nose: Nose normal    Mouth/Throat: Oropharynx is clear and moist  No oropharyngeal exudate  Eyes: Pupils are equal, round, and reactive to light  Conjunctivae and EOM are normal  Right eye exhibits no discharge  Left eye exhibits no discharge  No scleral icterus  Neck: Normal range of motion  Neck supple  No JVD present  No tracheal deviation present  No thyromegaly present  Cardiovascular: Normal rate, regular rhythm and normal heart sounds  No murmur heard  Pulmonary/Chest: Effort normal and breath sounds normal  No stridor  No respiratory distress  She has no wheezes  She has no rales  She exhibits no tenderness  Abdominal: Soft  Bowel sounds are normal  She exhibits no distension and no mass  There is no tenderness  There is no rebound and no guarding  Musculoskeletal: She exhibits tenderness  She exhibits no edema or deformity  Legs:  Lymphadenopathy:     She has no cervical adenopathy  Neurological: She is alert and oriented to person, place, and time  She has normal reflexes  She displays normal reflexes  No cranial nerve deficit  She exhibits normal muscle tone  Coordination normal    Skin: Skin is warm and dry  No rash noted  She is not diaphoretic  No erythema  No pallor  Psychiatric: She has a normal mood and affect  Her behavior is normal  Judgment and thought content normal    Nursing note and vitals reviewed        Vital Signs  ED Triage Vitals [11/26/19 2017]   Temperature Pulse Respirations Blood Pressure SpO2   98 2 °F (36 8 °C) 98 20 (!) 186/84 97 %      Temp Source Heart Rate Source Patient Position - Orthostatic VS BP Location FiO2 (%)   Temporal Monitor Lying Left arm --      Pain Score       9           Vitals:    11/26/19 2017 11/26/19 2130 11/26/19 2200   BP: (!) 186/84 150/66    Pulse: 98 101 90   Patient Position - Orthostatic VS: Lying           Visual Acuity      ED Medications  Medications   oxyCODONE-acetaminophen (PERCOCET) 5-325 mg per tablet 1 tablet (1 tablet Oral Given 11/26/19 2153)   ondansetron (ZOFRAN-ODT) dispersible tablet 4 mg (4 mg Oral Given 11/26/19 2153)   naproxen (NAPROSYN) tablet 500 mg (500 mg Oral Given 11/26/19 2153)       Diagnostic Studies  Results Reviewed     None                 XR knee 3 views right non injury    (Results Pending)              Procedures  Procedures       ED Course                               MDM  Number of Diagnoses or Management Options  Acute pain of right knee:   Diagnosis management comments: IMP: knee strain versus contusion  Doubt vascular injury or fracture  Plan: check xray knee, give po narcotic pain meds, po anti-inflammatory and knee immobilizer prn   - xray knee no acute  - Pt with likely knee strain versus contusion  Pt is improved and will f/up w/ Ortho         Amount and/or Complexity of Data Reviewed  Tests in the radiology section of CPT®: ordered and reviewed  Decide to obtain previous medical records or to obtain history from someone other than the patient: yes  Review and summarize past medical records: yes  Independent visualization of images, tracings, or specimens: yes    Risk of Complications, Morbidity, and/or Mortality  Presenting problems: high  Diagnostic procedures: low  Management options: low    Patient Progress  Patient progress: improved      Disposition  Final diagnoses:   Acute pain of right knee     Time reflects when diagnosis was documented in both MDM as applicable and the Disposition within this note     Time User Action Codes Description Comment    11/26/2019 10:06 PM Olman Mcgill Add [M20 384] Acute pain of right knee       ED Disposition     ED Disposition Condition Date/Time Comment    Discharge Stable Tue Nov 26, 2019 10:05 PM Moni Quintero discharge to home/self care  Follow-up Information     Follow up With Specialties Details Why Contact Info    Braydon Evans MD Orthopedic Surgery Schedule an appointment as soon as possible for a visit in 1 day Return immediately, If symptoms worsen [address] [facility] [id number]  [phone]            Patient's Medications   Discharge Prescriptions    NAPROXEN (NAPROSYN) 500 MG TABLET    Take 1 tablet (500 mg total) by mouth 2 (two) times a day with meals for 8 doses       Start Date: 11/26/2019End Date: 11/30/2019       Order Dose: 500 mg       Quantity: 8 tablet    Refills: 0    ONDANSETRON (ZOFRAN) 4 MG TABLET    Take 1 tablet (4 mg total) by mouth every 8 (eight) hours as needed for nausea for up to 30 doses       Start Date: 11/26/2019End Date: --       Order Dose: 4 mg       Quantity: 30 tablet    Refills: 0    OXYCODONE-ACETAMINOPHEN (PERCOCET) 5-325 MG PER TABLET    Take 1 tablet by mouth every 4 (four) hours as needed for moderate pain for up to 10 daysMax Daily Amount: 6 tablets       Start Date: 11/26/2019End Date: 12/6/2019       Order Dose: 1 tablet       Quantity: 10 tablet    Refills: 0     No discharge procedures on file      ED Provider  Electronically Signed by           Norman Cardozo DO  11/26/19 3090

## 2019-12-11 ENCOUNTER — OFFICE VISIT (OUTPATIENT)
Dept: OBGYN CLINIC | Facility: CLINIC | Age: 59
End: 2019-12-11
Payer: COMMERCIAL

## 2019-12-11 VITALS
DIASTOLIC BLOOD PRESSURE: 83 MMHG | SYSTOLIC BLOOD PRESSURE: 155 MMHG | HEIGHT: 63 IN | BODY MASS INDEX: 39.34 KG/M2 | WEIGHT: 222 LBS | HEART RATE: 105 BPM

## 2019-12-11 DIAGNOSIS — S83.231D COMPLEX TEAR OF MEDIAL MENISCUS OF RIGHT KNEE AS CURRENT INJURY, SUBSEQUENT ENCOUNTER: Primary | ICD-10-CM

## 2019-12-11 PROCEDURE — 20610 DRAIN/INJ JOINT/BURSA W/O US: CPT | Performed by: ORTHOPAEDIC SURGERY

## 2019-12-11 PROCEDURE — 99203 OFFICE O/P NEW LOW 30 MIN: CPT | Performed by: ORTHOPAEDIC SURGERY

## 2019-12-11 RX ORDER — METHYLPREDNISOLONE ACETATE 40 MG/ML
2 INJECTION, SUSPENSION INTRA-ARTICULAR; INTRALESIONAL; INTRAMUSCULAR; SOFT TISSUE
Status: COMPLETED | OUTPATIENT
Start: 2019-12-11 | End: 2019-12-11

## 2019-12-11 RX ORDER — BUPIVACAINE HYDROCHLORIDE 2.5 MG/ML
4 INJECTION, SOLUTION INFILTRATION; PERINEURAL
Status: COMPLETED | OUTPATIENT
Start: 2019-12-11 | End: 2019-12-11

## 2019-12-11 RX ADMIN — BUPIVACAINE HYDROCHLORIDE 4 ML: 2.5 INJECTION, SOLUTION INFILTRATION; PERINEURAL at 13:20

## 2019-12-11 RX ADMIN — METHYLPREDNISOLONE ACETATE 2 ML: 40 INJECTION, SUSPENSION INTRA-ARTICULAR; INTRALESIONAL; INTRAMUSCULAR; SOFT TISSUE at 13:20

## 2019-12-11 NOTE — PROGRESS NOTES
Chief Complaint   right knee pain 2 months    History Of Presenting Illness  Giselle Ruiz 1960 presents with  Right knee pain 2 months  Onset when she twisted the coming down stairs  Since then she has had multiple episodes of giving way of the knee  Patient has sense of instability in the knee  Patient seen and treated in the emergency room  Patient presents for evaluation with x-rays  Pain mainly present medial joint line  Patient is well otherwise except diabetes  Patient continues to smoke    Patient is moderately obese      Current Medications  Current Outpatient Medications   Medication Sig Dispense Refill    ACCU-CHEK TSERING PLUS test strip TEST twice a day 100 each 3    ACCU-CHEK SOFTCLIX LANCETS lancets TEST twice a day 100 each 3    ALPRAZolam (XANAX) 0 25 mg tablet Take 0 25 mg by mouth daily at bedtime as needed for anxiety      aspirin 325 mg tablet Take 325 mg by mouth daily      carvedilol (COREG) 12 5 mg tablet Take 1 tablet (12 5 mg total) by mouth 2 (two) times a day with meals 180 tablet 3    cetirizine (ZyrTEC) 10 mg tablet take 1 tablet by mouth once daily 30 tablet 3    Cholecalciferol (VITAMIN D3 PO) Take 2,000 Units by mouth 2 (two) times a day      cyanocobalamin 2000 MCG tablet Take 2,000 mcg by mouth daily      fluticasone (FLONASE) 50 mcg/act nasal spray 1 spray into each nostril daily 1 Bottle 5    glipiZIDE (GLUCOTROL) 5 mg tablet Take 1 tablet (5 mg total) by mouth daily 30 tablet 3    ibuprofen (MOTRIN) 800 mg tablet Take 1 tablet (800 mg total) by mouth every 8 (eight) hours as needed for mild pain 90 tablet 0    JANUVIA 100 MG tablet take 1 tablet by mouth once daily 90 tablet 3    losartan (COZAAR) 100 MG tablet take 1 tablet by mouth once daily 90 tablet 3    metFORMIN (GLUCOPHAGE) 500 mg tablet Take 2 tablets in the am and one tablet in the even by mouth 90 tablet 3    Multiple Vitamin-Folic Acid TABS Take by mouth      nitroglycerin (NITROSTAT) 0 4 mg SL tablet Place 1 tablet (0 4 mg total) under the tongue every 5 (five) minutes as needed for chest pain 30 tablet 3    ondansetron (ZOFRAN) 4 mg tablet Take 1 tablet (4 mg total) by mouth every 8 (eight) hours as needed for nausea for up to 30 doses 30 tablet 0    rosuvastatin (CRESTOR) 40 MG tablet take 1 tablet by mouth once daily 90 tablet 3    naproxen (NAPROSYN) 500 mg tablet Take 1 tablet (500 mg total) by mouth 2 (two) times a day with meals for 8 doses 8 tablet 0    nicotine (NICODERM CQ) 14 mg/24hr TD 24 hr patch Place 1 patch on the skin every 24 hours (Patient not taking: Reported on 5/2/2019) 28 patch 0    RA EYE ITCH RELIEF 0 025 % ophthalmic solution place 1 drop into both eyes twice a day  0     No current facility-administered medications for this visit          Current Problems    Active Problems:   Patient Active Problem List    Diagnosis Date Noted    Screening for breast cancer 11/04/2019    Screening for colon cancer 11/04/2019    Acute pain of right knee 11/04/2019    Dry eye 04/03/2019    Eye drainage 04/03/2019    Left bundle branch block (LBBB) 06/05/2018    Tobacco abuse 06/05/2018    Leukocytosis 04/24/2018    Type 2 diabetes mellitus without complication, without long-term current use of insulin ([facility] [address] ) 02/28/2017    Obesity 08/09/2013    Anxiety 06/03/2013    Atherosclerotic heart disease of native coronary artery without angina pectoris 06/03/2013    Dyslipidemia 06/03/2013    Benign essential hypertension 05/13/2013         Review of Systems:    General: negative for - chills, fatigue, fever,  weight gain or weight loss  Psychological: negative for - anxiety, behavioral disorder, concentration difficulties  Ophthalmic: negative for - blurry vision, decreased vision, double vision,      Past Medical History:   Past Medical History:   Diagnosis Date    Cardiac disease     MI May 25, 2013    Chronic neck pain     Last Assessed:11/28/16    Diabetes mellitus ([facility] [address] )  Hyperlipidemia     Hypertension     Left bundle branch block     Last Assessed:12/5/13    Mild depression ([address] )     Last Assessed:11/28/16    Tonsillar hypertrophy     Last Assessed:8/27/13       Past Surgical History:   Past Surgical History:   Procedure Laterality Date    CARDIAC SURGERY      1 stent placed    CORONARY ANGIOPLASTY WITH STENT PLACEMENT  05/28/2013    per [employer]: Cath stent 1 type drug-eluting, mild RCA       Family History:  Family history reviewed and non-contributory  Family History   Problem Relation Age of Onset    Arthritis Mother     Colon cancer Mother 66    Coronary artery disease Mother     Fibrocystic breast disease Mother     Hypertension Mother     Cardiomyopathy Mother         Ischemic Dilated Cardiomyopathy    Arthritis Father     Kidney disease Father         Chronic (NKF classification)    Coronary artery disease Father     Gout Father     Prostate cancer Father     Hyperlipidemia Father         Pure Hypercholesterolemia    Stroke Father         Stroke Syndrome    Diabetes type II Father     Hypertension Sister     Hypothyroidism Sister     Other Sister         IVP- Solitary Kidney Calculus    Rheum arthritis Sister     Alcohol abuse Brother     Bipolar disorder Brother         NOS    Other Brother         IVP- Solitary Kidney Calculus    Francois's disease Brother     Cerebral aneurysm Brother         Ruptured    Breast cancer Maternal Aunt     Peripheral vascular disease Maternal Aunt     Breast cancer Cousin         2 maternal cousins    Lung cancer Maternal Uncle     Diabetes type II Maternal Uncle     Thrombocytopenia Daughter         Idiopathic Thrombocytopenia Purpura at 10 yo       Social History:  Social History     Socioeconomic History    Marital status:      Spouse name: None    Number of children: 2    Years of education: None    Highest education level: None   Occupational History    Occupation: admin office manager     Comment: [employer]   Social Needs    Financial resource strain: None    Food insecurity:     Worry: None     Inability: None    Transportation needs:     Medical: None     Non-medical: None   Tobacco Use    Smoking status: Current Every Day Smoker     Packs/day: 0 50     Years: 20 00     Pack years: 10 00     Types: Cigarettes    Smokeless tobacco: Never Used   Substance and Sexual Activity    Alcohol use: No    Drug use: No     Comment: occassionally     Sexual activity: None   Lifestyle    Physical activity:     Days per week: None     Minutes per session: None    Stress: None   Relationships    Social connections:     Talks on phone: None     Gets together: None     Attends Latter day service: None     Active member of club or organization: None     Attends meetings of clubs or organizations: None     Relationship status: None    Intimate partner violence:     Fear of current or ex partner: None     Emotionally abused: None     Physically abused: None     Forced sexual activity: None   Other Topics Concern    None   Social History Narrative    None       Allergies: Allergies   Allergen Reactions    Hctz [Hydrochlorothiazide] Shortness Of Breath and Dizziness    Wellbutrin [Bupropion] Shortness Of Breath     Category:  Adverse Reaction;     Ace Inhibitors Cough           Physical ExaminationBP 155/83   Pulse 105   Ht 5' 3" (1 6 m)   Wt 101 kg (222 lb)   BMI 39 33 kg/m²   Gen: Alert and oriented to person, place, time  HEENT: EOMI, eyes clear, moist mucus membranes, hearing intact      Orthopedic Exam   right knee minimal effusion   Full extension, flexion to 120, medial joint line tenderness Angelina's positive   radiographs normal except early DJD changes          Impression   right knee pain due to complex medial meniscal tear        Plan     discussed treatment with the patient   right knee injected with steroid and local anesthetic   patient will ice the knee, use over-the-counter pain medication, do home exercise program, and start physical therapy   patient counseled on smoking cessation   follow-up in 6 weeks   if the patient is still symptomatic we will arrange for an MRI  Large joint arthrocentesis: R knee  Date/Time: 12/11/2019 1:20 PM  Consent given by: patient  Site marked: site marked  Supporting Documentation  Indications: pain and joint swelling   Procedure Details  Location: knee - R knee  Preparation: Patient was prepped and draped in the usual sterile fashion  Needle size: 22 G  Ultrasound guidance: no  Approach: anterolateral  Medications administered: 4 mL bupivacaine 0 25 %; 2 mL methylPREDNISolone acetate 40 mg/mL    Patient tolerance: patient tolerated the procedure well with no immediate complications  Dressing:  Sterile dressing applied          Paxton Dave MD        Portions of the record may have been created with voice recognition software  Occasional wrong word or "sound a like" substitutions may have occurred due to the inherent limitations of voice recognition software  Read the chart carefully and recognize, using context, where substitutions have occurred

## 2019-12-11 NOTE — PATIENT INSTRUCTIONS
Meniscus Tear   AMBULATORY CARE:   A meniscus tear  is a tear in the cartilage of your knee  The meniscus is a piece of cartilage (strong tissue) between your thighbone and shinbone  The meniscus helps to cushion your knee joint and keep it stable  Common symptoms include the following:   · A pop or tear when the injury happens    · Pain and swelling    · Tenderness    · Stiffness    · Popping, catching, or locking of your knee    · Not being able to extend your knee fully  Call 911 for any of the following:   · Your arm or leg feels warm, tender, and painful  It may look swollen and red  Seek care immediately if:   · You cannot move your knee at all  Contact your healthcare provider if:   · Your symptoms do not improve with treatment  · You have questions or concerns about your condition or care  Treatment for a meniscus tear  depends on the type of tear you have  Some types of meniscus tears can heal on their own  You may need any of the following:  · NSAIDs , such as ibuprofen, help decrease swelling, pain, and fever  This medicine is available with or without a doctor's order  NSAIDs can cause stomach bleeding or kidney problems in certain people  If you take blood thinner medicine, always ask if NSAIDs are safe for you  Always read the medicine label and follow directions  Do not give these medicines to children under 10months of age without direction from your child's healthcare provider  · Rest  your knee  Avoid activities that make the swelling or pain worse  You may need to avoid putting weight on your leg while you have pain  Your healthcare provider may recommend that you use crutches  · Apply ice  on your knee for 15 to 20 minutes every hour or as directed  Use an ice pack, or put crushed ice in a plastic bag  Cover it with a towel  Ice helps prevent tissue damage and decreases swelling and pain      · Compress  your knee with an elastic bandage, air cast, medical boot, or splint to reduce swelling  Ask your healthcare provider which compression device to use, and how tight it should be  · Elevate  your knee above the level of your heart as often as you can  This will help decrease swelling and pain  Prop your knee on pillows or blankets to keep it elevated comfortably  · Surgery  may be needed if your symptoms do not improve  Your healthcare provider may trim away or repair damaged tissue  Follow up with your healthcare provider as directed:  Write down your questions so you remember to ask them during your visits  © 2017 [employer]  Information is for End User's use only and may not be sold, redistributed or otherwise used for commercial purposes  All illustrations and images included in CareNotes® are the copyrighted property of [employer]  or [employer]  The above information is an  only  It is not intended as medical advice for individual conditions or treatments  Talk to your doctor, nurse or pharmacist before following any medical regimen to see if it is safe and effective for you  Cigarette Smoking and Your Health   AMBULATORY CARE:   Risks to your health if you smoke:  Nicotine and other chemicals found in tobacco damage every cell in your body  Even if you are a light smoker, you have an increased risk for cancer, heart disease, and lung disease  If you are pregnant or have diabetes, smoking increases your risk for complications  Benefits to your health if you stop smoking:   · You decrease respiratory symptoms such as coughing, wheezing, and shortness of breath  · You reduce your risk for cancers of the lung, mouth, throat, kidney, bladder, pancreas, stomach, and cervix  If you already have cancer, you increase the benefits of chemotherapy  You also reduce your risk for cancer returning or a second cancer from developing  · You reduce your risk for heart disease, blood clots, heart attack, and stroke       · You reduce your risk for lung infections, and diseases such as pneumonia, asthma, chronic bronchitis, and emphysema  · Your circulation improves  More oxygen can be delivered to your body  If you have diabetes, you lower your risk for complications, such as kidney, artery, and eye diseases  You also lower your risk for nerve damage  Nerve damage can lead to amputations, poor vision, and blindness  · You improve your body's ability to heal and to fight infections  Benefits to the health of others if you stop smoking:  Tobacco is harmful to nonsmokers who breathe in your secondhand smoke  The following are ways the health of others around you may improve when you stop smoking:  · You lower the risks for lung cancer and heart disease in nonsmoking adults  · If you are pregnant, you lower the risk for miscarriage, early delivery, low birth weight, and stillbirth  You also lower your baby's risk for SIDS, obesity, developmental delay, and neurobehavioral problems, such as ADHD  · If you have children, you lower their risk for ear infections, colds, pneumonia, bronchitis, and asthma  For more information and support to stop smoking:   · [employer]  Phone: 8- 556 - [phone]  Web Address: www [employer]  Follow up with your healthcare provider as directed:  Write down your questions so you remember to ask them during your visits  © 2017 [employer] [place] Information is for End User's use only and may not be sold, redistributed or otherwise used for commercial purposes  All illustrations and images included in CareNotes® are the copyrighted property of [employer]  or [employer]  The above information is an  only  It is not intended as medical advice for individual conditions or treatments  Talk to your doctor, nurse or pharmacist before following any medical regimen to see if it is safe and effective for you

## 2019-12-15 DIAGNOSIS — E11.9 TYPE 2 DIABETES MELLITUS WITHOUT COMPLICATION (HCC): ICD-10-CM

## 2019-12-15 RX ORDER — LANCETS
EACH MISCELLANEOUS
Qty: 100 EACH | Refills: 0 | Status: SHIPPED | OUTPATIENT
Start: 2019-12-15 | End: 2020-03-18 | Stop reason: SDUPTHER

## 2019-12-24 DIAGNOSIS — H57.89 EYE DRAINAGE: ICD-10-CM

## 2019-12-24 DIAGNOSIS — H04.129 DRY EYE: ICD-10-CM

## 2019-12-26 RX ORDER — CETIRIZINE HYDROCHLORIDE 10 MG/1
10 TABLET ORAL DAILY
Qty: 30 TABLET | Refills: 3 | Status: SHIPPED | OUTPATIENT
Start: 2019-12-26 | End: 2020-06-04 | Stop reason: SDUPTHER

## 2020-01-15 DIAGNOSIS — E11.00 TYPE 2 DIABETES MELLITUS WITH HYPEROSMOLARITY WITHOUT COMA, WITHOUT LONG-TERM CURRENT USE OF INSULIN (HCC): ICD-10-CM

## 2020-03-02 DIAGNOSIS — I10 ESSENTIAL HYPERTENSION: ICD-10-CM

## 2020-03-02 DIAGNOSIS — E11.00 TYPE 2 DIABETES MELLITUS WITH HYPEROSMOLARITY WITHOUT COMA, WITHOUT LONG-TERM CURRENT USE OF INSULIN (HCC): ICD-10-CM

## 2020-03-02 RX ORDER — LOSARTAN POTASSIUM 100 MG/1
TABLET ORAL
Qty: 90 TABLET | Refills: 3 | Status: SHIPPED | OUTPATIENT
Start: 2020-03-02 | End: 2020-05-24 | Stop reason: SDUPTHER

## 2020-03-02 RX ORDER — GLIPIZIDE 5 MG/1
5 TABLET ORAL DAILY
Qty: 30 TABLET | Refills: 3 | Status: SHIPPED | OUTPATIENT
Start: 2020-03-02 | End: 2020-07-17 | Stop reason: SDUPTHER

## 2020-03-18 DIAGNOSIS — E11.9 TYPE 2 DIABETES MELLITUS WITHOUT COMPLICATION (HCC): ICD-10-CM

## 2020-03-18 RX ORDER — LANCETS
EACH MISCELLANEOUS
Qty: 100 EACH | Refills: 0 | Status: SHIPPED | OUTPATIENT
Start: 2020-03-18 | End: 2020-05-04

## 2020-04-07 ENCOUNTER — TELEPHONE (OUTPATIENT)
Dept: INTERNAL MEDICINE CLINIC | Facility: CLINIC | Age: 60
End: 2020-04-07

## 2020-05-04 ENCOUNTER — TELEPHONE (OUTPATIENT)
Dept: ADMINISTRATIVE | Facility: OTHER | Age: 60
End: 2020-05-04

## 2020-05-04 ENCOUNTER — TELEMEDICINE (OUTPATIENT)
Dept: INTERNAL MEDICINE CLINIC | Facility: CLINIC | Age: 60
End: 2020-05-04
Payer: COMMERCIAL

## 2020-05-04 VITALS — BODY MASS INDEX: 39.34 KG/M2 | HEIGHT: 63 IN | WEIGHT: 222 LBS

## 2020-05-04 DIAGNOSIS — E11.9 TYPE 2 DIABETES MELLITUS WITHOUT COMPLICATION, WITHOUT LONG-TERM CURRENT USE OF INSULIN (HCC): Primary | ICD-10-CM

## 2020-05-04 DIAGNOSIS — I10 BENIGN ESSENTIAL HYPERTENSION: ICD-10-CM

## 2020-05-04 DIAGNOSIS — E78.5 DYSLIPIDEMIA: ICD-10-CM

## 2020-05-04 DIAGNOSIS — I44.7 LEFT BUNDLE BRANCH BLOCK (LBBB): ICD-10-CM

## 2020-05-04 PROBLEM — Z12.39 SCREENING FOR BREAST CANCER: Status: RESOLVED | Noted: 2019-11-04 | Resolved: 2020-05-04

## 2020-05-04 PROBLEM — H04.129 DRY EYE: Status: RESOLVED | Noted: 2019-04-03 | Resolved: 2020-05-04

## 2020-05-04 PROBLEM — M25.561 ACUTE PAIN OF RIGHT KNEE: Status: RESOLVED | Noted: 2019-11-04 | Resolved: 2020-05-04

## 2020-05-04 PROBLEM — H57.89 EYE DRAINAGE: Status: RESOLVED | Noted: 2019-04-03 | Resolved: 2020-05-04

## 2020-05-04 PROBLEM — Z12.11 SCREENING FOR COLON CANCER: Status: RESOLVED | Noted: 2019-11-04 | Resolved: 2020-05-04

## 2020-05-04 PROCEDURE — 99214 OFFICE O/P EST MOD 30 MIN: CPT | Performed by: NURSE PRACTITIONER

## 2020-05-04 PROCEDURE — 3008F BODY MASS INDEX DOCD: CPT | Performed by: INTERNAL MEDICINE

## 2020-05-04 RX ORDER — LANCETS
1 EACH MISCELLANEOUS
COMMUNITY
End: 2020-12-07

## 2020-05-04 RX ORDER — BLOOD-GLUCOSE METER
EACH MISCELLANEOUS
COMMUNITY
End: 2022-01-03 | Stop reason: CLARIF

## 2020-05-08 DIAGNOSIS — E78.5 DYSLIPIDEMIA: ICD-10-CM

## 2020-05-08 DIAGNOSIS — I10 BENIGN ESSENTIAL HYPERTENSION: ICD-10-CM

## 2020-05-08 RX ORDER — ROSUVASTATIN CALCIUM 40 MG/1
TABLET, COATED ORAL
Qty: 90 TABLET | Refills: 3 | Status: SHIPPED | OUTPATIENT
Start: 2020-05-08 | End: 2020-06-01

## 2020-05-08 RX ORDER — CARVEDILOL 12.5 MG/1
TABLET ORAL
Qty: 180 TABLET | Refills: 3 | Status: SHIPPED | OUTPATIENT
Start: 2020-05-08 | End: 2020-06-01

## 2020-05-14 DIAGNOSIS — E11.00 TYPE 2 DIABETES MELLITUS WITH HYPEROSMOLARITY WITHOUT COMA, WITHOUT LONG-TERM CURRENT USE OF INSULIN (HCC): ICD-10-CM

## 2020-05-24 DIAGNOSIS — I10 ESSENTIAL HYPERTENSION: ICD-10-CM

## 2020-05-24 PROCEDURE — 4010F ACE/ARB THERAPY RXD/TAKEN: CPT | Performed by: INTERNAL MEDICINE

## 2020-05-24 RX ORDER — LOSARTAN POTASSIUM 100 MG/1
TABLET ORAL
Qty: 90 TABLET | Refills: 3 | Status: SHIPPED | OUTPATIENT
Start: 2020-05-24 | End: 2021-05-15

## 2020-05-31 DIAGNOSIS — E78.5 DYSLIPIDEMIA: ICD-10-CM

## 2020-05-31 DIAGNOSIS — I10 BENIGN ESSENTIAL HYPERTENSION: ICD-10-CM

## 2020-06-01 RX ORDER — CARVEDILOL 12.5 MG/1
TABLET ORAL
Qty: 180 TABLET | Refills: 3 | Status: SHIPPED | OUTPATIENT
Start: 2020-06-01 | End: 2021-05-25

## 2020-06-01 RX ORDER — ROSUVASTATIN CALCIUM 40 MG/1
TABLET, COATED ORAL
Qty: 90 TABLET | Refills: 3 | Status: SHIPPED | OUTPATIENT
Start: 2020-06-01 | End: 2021-05-25

## 2020-06-04 DIAGNOSIS — H57.89 EYE DRAINAGE: ICD-10-CM

## 2020-06-04 DIAGNOSIS — H04.129 DRY EYE: ICD-10-CM

## 2020-06-04 DIAGNOSIS — E11.00 TYPE 2 DIABETES MELLITUS WITH HYPEROSMOLARITY WITHOUT COMA, WITHOUT LONG-TERM CURRENT USE OF INSULIN (HCC): ICD-10-CM

## 2020-06-04 RX ORDER — CETIRIZINE HYDROCHLORIDE 10 MG/1
10 TABLET ORAL DAILY
Qty: 30 TABLET | Refills: 3 | Status: SHIPPED | OUTPATIENT
Start: 2020-06-04 | End: 2020-11-15

## 2020-06-30 ENCOUNTER — OFFICE VISIT (OUTPATIENT)
Dept: CARDIOLOGY CLINIC | Facility: HOSPITAL | Age: 60
End: 2020-06-30
Payer: COMMERCIAL

## 2020-06-30 VITALS
WEIGHT: 219 LBS | DIASTOLIC BLOOD PRESSURE: 62 MMHG | SYSTOLIC BLOOD PRESSURE: 134 MMHG | HEART RATE: 97 BPM | BODY MASS INDEX: 38.79 KG/M2

## 2020-06-30 DIAGNOSIS — E78.5 DYSLIPIDEMIA: ICD-10-CM

## 2020-06-30 DIAGNOSIS — I25.10 ATHEROSCLEROSIS OF NATIVE CORONARY ARTERY OF NATIVE HEART WITHOUT ANGINA PECTORIS: Primary | ICD-10-CM

## 2020-06-30 DIAGNOSIS — I44.7 LEFT BUNDLE BRANCH BLOCK (LBBB): ICD-10-CM

## 2020-06-30 DIAGNOSIS — Z95.5 PRESENCE OF DRUG COATED STENT IN RIGHT CORONARY ARTERY: ICD-10-CM

## 2020-06-30 DIAGNOSIS — Z72.0 TOBACCO ABUSE: ICD-10-CM

## 2020-06-30 DIAGNOSIS — I10 BENIGN ESSENTIAL HYPERTENSION: ICD-10-CM

## 2020-06-30 DIAGNOSIS — E66.01 CLASS 2 SEVERE OBESITY DUE TO EXCESS CALORIES WITH SERIOUS COMORBIDITY AND BODY MASS INDEX (BMI) OF 39.0 TO 39.9 IN ADULT (HCC): ICD-10-CM

## 2020-06-30 PROCEDURE — 3075F SYST BP GE 130 - 139MM HG: CPT | Performed by: INTERNAL MEDICINE

## 2020-06-30 PROCEDURE — 2022F DILAT RTA XM EVC RTNOPTHY: CPT | Performed by: INTERNAL MEDICINE

## 2020-06-30 PROCEDURE — 99214 OFFICE O/P EST MOD 30 MIN: CPT | Performed by: INTERNAL MEDICINE

## 2020-06-30 PROCEDURE — 93000 ELECTROCARDIOGRAM COMPLETE: CPT | Performed by: INTERNAL MEDICINE

## 2020-06-30 PROCEDURE — 3078F DIAST BP <80 MM HG: CPT | Performed by: INTERNAL MEDICINE

## 2020-06-30 RX ORDER — FUROSEMIDE 20 MG/1
20 TABLET ORAL AS NEEDED
Qty: 30 TABLET | Refills: 6 | Status: SHIPPED | OUTPATIENT
Start: 2020-06-30 | End: 2021-01-31 | Stop reason: SDUPTHER

## 2020-07-17 DIAGNOSIS — E13.9 DIABETES 1.5, MANAGED AS TYPE 2 (HCC): ICD-10-CM

## 2020-07-17 DIAGNOSIS — E11.00 TYPE 2 DIABETES MELLITUS WITH HYPEROSMOLARITY WITHOUT COMA, WITHOUT LONG-TERM CURRENT USE OF INSULIN (HCC): ICD-10-CM

## 2020-07-17 RX ORDER — GLIPIZIDE 5 MG/1
5 TABLET ORAL DAILY
Qty: 30 TABLET | Refills: 3 | Status: SHIPPED | OUTPATIENT
Start: 2020-07-17 | End: 2020-11-09

## 2020-08-20 ENCOUNTER — TELEPHONE (OUTPATIENT)
Dept: INTERNAL MEDICINE CLINIC | Facility: CLINIC | Age: 60
End: 2020-08-20

## 2020-08-20 DIAGNOSIS — R21 RASH: Primary | ICD-10-CM

## 2020-08-20 RX ORDER — NYSTATIN 100000 [USP'U]/G
POWDER TOPICAL 2 TIMES DAILY
Qty: 60 G | Refills: 3 | Status: SHIPPED | OUTPATIENT
Start: 2020-08-20 | End: 2021-05-26 | Stop reason: SDUPTHER

## 2020-08-20 NOTE — TELEPHONE ENCOUNTER
Jose Crockett did ask to have a fill of fluocinonide to the pharmacy  She is having a rash under her breasts

## 2020-09-15 DIAGNOSIS — E11.00 TYPE 2 DIABETES MELLITUS WITH HYPEROSMOLARITY WITHOUT COMA, WITHOUT LONG-TERM CURRENT USE OF INSULIN (HCC): ICD-10-CM

## 2020-09-18 DIAGNOSIS — L23.7 POISON IVY: Primary | ICD-10-CM

## 2020-09-18 RX ORDER — PREDNISONE 10 MG/1
TABLET ORAL
Qty: 18 TABLET | Refills: 0 | Status: SHIPPED | OUTPATIENT
Start: 2020-09-18 | End: 2021-05-26 | Stop reason: ALTCHOICE

## 2020-10-07 DIAGNOSIS — I10 ESSENTIAL HYPERTENSION: ICD-10-CM

## 2020-11-07 DIAGNOSIS — E11.00 TYPE 2 DIABETES MELLITUS WITH HYPEROSMOLARITY WITHOUT COMA, WITHOUT LONG-TERM CURRENT USE OF INSULIN (HCC): ICD-10-CM

## 2020-11-09 RX ORDER — GLIPIZIDE 5 MG/1
TABLET ORAL
Qty: 30 TABLET | Refills: 3 | Status: SHIPPED | OUTPATIENT
Start: 2020-11-09 | End: 2021-03-01

## 2020-11-13 DIAGNOSIS — H57.89 EYE DRAINAGE: ICD-10-CM

## 2020-11-13 DIAGNOSIS — H04.129 DRY EYE: ICD-10-CM

## 2020-11-15 RX ORDER — CETIRIZINE HYDROCHLORIDE 10 MG/1
TABLET ORAL
Qty: 30 TABLET | Refills: 3 | Status: SHIPPED | OUTPATIENT
Start: 2020-11-15 | End: 2021-04-18

## 2020-11-16 RX ORDER — CETIRIZINE HYDROCHLORIDE 10 MG/1
10 TABLET ORAL DAILY
Qty: 30 TABLET | Refills: 0 | OUTPATIENT
Start: 2020-11-16

## 2020-12-06 DIAGNOSIS — E11.9 TYPE 2 DIABETES MELLITUS WITHOUT COMPLICATION, WITHOUT LONG-TERM CURRENT USE OF INSULIN (HCC): Primary | ICD-10-CM

## 2020-12-07 RX ORDER — LANCETS 33 GAUGE
EACH MISCELLANEOUS
Qty: 100 EACH | Refills: 3 | Status: SHIPPED | OUTPATIENT
Start: 2020-12-07 | End: 2022-01-24

## 2020-12-07 RX ORDER — BLOOD SUGAR DIAGNOSTIC
STRIP MISCELLANEOUS
Qty: 100 EACH | Refills: 3 | Status: SHIPPED | OUTPATIENT
Start: 2020-12-07 | End: 2020-12-19 | Stop reason: SDUPTHER

## 2020-12-19 DIAGNOSIS — E11.9 TYPE 2 DIABETES MELLITUS WITHOUT COMPLICATION, WITHOUT LONG-TERM CURRENT USE OF INSULIN (HCC): ICD-10-CM

## 2020-12-21 RX ORDER — BLOOD SUGAR DIAGNOSTIC
STRIP MISCELLANEOUS
Qty: 100 EACH | Refills: 0 | Status: SHIPPED | OUTPATIENT
Start: 2020-12-21 | End: 2021-03-12

## 2021-01-11 ENCOUNTER — IMMUNIZATIONS (OUTPATIENT)
Dept: FAMILY MEDICINE CLINIC | Facility: HOSPITAL | Age: 61
End: 2021-01-11

## 2021-01-11 DIAGNOSIS — Z23 ENCOUNTER FOR IMMUNIZATION: ICD-10-CM

## 2021-01-11 PROCEDURE — 91301 SARS-COV-2 / COVID-19 MRNA VACCINE (MODERNA) 100 MCG: CPT

## 2021-01-11 PROCEDURE — 0011A SARS-COV-2 / COVID-19 MRNA VACCINE (MODERNA) 100 MCG: CPT

## 2021-01-31 DIAGNOSIS — I44.7 LEFT BUNDLE BRANCH BLOCK (LBBB): ICD-10-CM

## 2021-01-31 RX ORDER — FUROSEMIDE 20 MG/1
TABLET ORAL
Qty: 30 TABLET | Refills: 6 | Status: SHIPPED | OUTPATIENT
Start: 2021-01-31 | End: 2021-08-31

## 2021-02-08 ENCOUNTER — IMMUNIZATIONS (OUTPATIENT)
Dept: FAMILY MEDICINE CLINIC | Facility: HOSPITAL | Age: 61
End: 2021-02-08

## 2021-02-08 DIAGNOSIS — Z23 ENCOUNTER FOR IMMUNIZATION: Primary | ICD-10-CM

## 2021-02-08 PROCEDURE — 91301 SARS-COV-2 / COVID-19 MRNA VACCINE (MODERNA) 100 MCG: CPT

## 2021-02-08 PROCEDURE — 0012A SARS-COV-2 / COVID-19 MRNA VACCINE (MODERNA) 100 MCG: CPT

## 2021-03-01 DIAGNOSIS — E11.00 TYPE 2 DIABETES MELLITUS WITH HYPEROSMOLARITY WITHOUT COMA, WITHOUT LONG-TERM CURRENT USE OF INSULIN (HCC): ICD-10-CM

## 2021-03-01 RX ORDER — GLIPIZIDE 5 MG/1
TABLET ORAL
Qty: 30 TABLET | Refills: 3 | Status: SHIPPED | OUTPATIENT
Start: 2021-03-01 | End: 2021-06-28

## 2021-03-12 DIAGNOSIS — E11.9 TYPE 2 DIABETES MELLITUS WITHOUT COMPLICATION, WITHOUT LONG-TERM CURRENT USE OF INSULIN (HCC): ICD-10-CM

## 2021-03-12 RX ORDER — BLOOD SUGAR DIAGNOSTIC
STRIP MISCELLANEOUS
Qty: 100 EACH | Refills: 3 | Status: SHIPPED | OUTPATIENT
Start: 2021-03-12 | End: 2021-03-15

## 2021-03-15 DIAGNOSIS — E11.9 TYPE 2 DIABETES MELLITUS WITHOUT COMPLICATION, WITHOUT LONG-TERM CURRENT USE OF INSULIN (HCC): ICD-10-CM

## 2021-03-15 RX ORDER — BLOOD SUGAR DIAGNOSTIC
STRIP MISCELLANEOUS
Qty: 100 EACH | Refills: 0 | Status: SHIPPED | OUTPATIENT
Start: 2021-03-15 | End: 2021-04-14 | Stop reason: SDUPTHER

## 2021-03-15 RX ORDER — BLOOD SUGAR DIAGNOSTIC
STRIP MISCELLANEOUS
Qty: 100 EACH | Refills: 0 | Status: SHIPPED | OUTPATIENT
Start: 2021-03-15 | End: 2021-03-15

## 2021-03-15 RX ORDER — BLOOD SUGAR DIAGNOSTIC
STRIP MISCELLANEOUS
Qty: 100 EACH | Refills: 3 | Status: SHIPPED | OUTPATIENT
Start: 2021-03-15 | End: 2021-04-14

## 2021-03-26 DIAGNOSIS — E11.00 TYPE 2 DIABETES MELLITUS WITH HYPEROSMOLARITY WITHOUT COMA, WITHOUT LONG-TERM CURRENT USE OF INSULIN (HCC): ICD-10-CM

## 2021-04-14 DIAGNOSIS — E11.9 TYPE 2 DIABETES MELLITUS WITHOUT COMPLICATION, WITHOUT LONG-TERM CURRENT USE OF INSULIN (HCC): ICD-10-CM

## 2021-04-14 RX ORDER — BLOOD SUGAR DIAGNOSTIC
STRIP MISCELLANEOUS
Qty: 100 EACH | Refills: 5 | Status: SHIPPED | OUTPATIENT
Start: 2021-04-14 | End: 2022-07-15

## 2021-04-18 DIAGNOSIS — H57.89 EYE DRAINAGE: ICD-10-CM

## 2021-04-18 DIAGNOSIS — H04.129 DRY EYE: ICD-10-CM

## 2021-04-18 RX ORDER — CETIRIZINE HYDROCHLORIDE 10 MG/1
TABLET ORAL
Qty: 30 TABLET | Refills: 3 | Status: SHIPPED | OUTPATIENT
Start: 2021-04-18 | End: 2021-10-29

## 2021-05-15 DIAGNOSIS — I10 ESSENTIAL HYPERTENSION: ICD-10-CM

## 2021-05-15 PROCEDURE — 4010F ACE/ARB THERAPY RXD/TAKEN: CPT | Performed by: NURSE PRACTITIONER

## 2021-05-15 RX ORDER — LOSARTAN POTASSIUM 100 MG/1
TABLET ORAL
Qty: 90 TABLET | Refills: 3 | Status: SHIPPED | OUTPATIENT
Start: 2021-05-15 | End: 2022-05-16

## 2021-05-25 DIAGNOSIS — I10 BENIGN ESSENTIAL HYPERTENSION: ICD-10-CM

## 2021-05-25 DIAGNOSIS — E78.5 DYSLIPIDEMIA: ICD-10-CM

## 2021-05-25 RX ORDER — ROSUVASTATIN CALCIUM 40 MG/1
TABLET, COATED ORAL
Qty: 90 TABLET | Refills: 3 | Status: SHIPPED | OUTPATIENT
Start: 2021-05-25 | End: 2022-05-24

## 2021-05-25 RX ORDER — CARVEDILOL 12.5 MG/1
TABLET ORAL
Qty: 180 TABLET | Refills: 3 | Status: SHIPPED | OUTPATIENT
Start: 2021-05-25 | End: 2022-05-24

## 2021-05-26 ENCOUNTER — OFFICE VISIT (OUTPATIENT)
Dept: INTERNAL MEDICINE CLINIC | Facility: CLINIC | Age: 61
End: 2021-05-26
Payer: COMMERCIAL

## 2021-05-26 VITALS
HEIGHT: 64 IN | DIASTOLIC BLOOD PRESSURE: 80 MMHG | OXYGEN SATURATION: 97 % | TEMPERATURE: 96.6 F | SYSTOLIC BLOOD PRESSURE: 126 MMHG | HEART RATE: 93 BPM | WEIGHT: 222.1 LBS | BODY MASS INDEX: 37.92 KG/M2

## 2021-05-26 DIAGNOSIS — E11.9 TYPE 2 DIABETES MELLITUS WITHOUT COMPLICATION, WITHOUT LONG-TERM CURRENT USE OF INSULIN (HCC): ICD-10-CM

## 2021-05-26 DIAGNOSIS — M54.41 ACUTE BILATERAL LOW BACK PAIN WITH BILATERAL SCIATICA: ICD-10-CM

## 2021-05-26 DIAGNOSIS — I25.10 ATHEROSCLEROSIS OF NATIVE CORONARY ARTERY OF NATIVE HEART WITHOUT ANGINA PECTORIS: ICD-10-CM

## 2021-05-26 DIAGNOSIS — Z00.00 ROUTINE ADULT HEALTH MAINTENANCE: Primary | ICD-10-CM

## 2021-05-26 DIAGNOSIS — I44.7 LEFT BUNDLE BRANCH BLOCK (LBBB): ICD-10-CM

## 2021-05-26 DIAGNOSIS — I10 BENIGN ESSENTIAL HYPERTENSION: ICD-10-CM

## 2021-05-26 DIAGNOSIS — E78.5 DYSLIPIDEMIA: ICD-10-CM

## 2021-05-26 DIAGNOSIS — J01.10 ACUTE NON-RECURRENT FRONTAL SINUSITIS: ICD-10-CM

## 2021-05-26 DIAGNOSIS — E55.9 VITAMIN D DEFICIENCY: ICD-10-CM

## 2021-05-26 DIAGNOSIS — Z12.31 ENCOUNTER FOR SCREENING MAMMOGRAM FOR MALIGNANT NEOPLASM OF BREAST: ICD-10-CM

## 2021-05-26 DIAGNOSIS — M54.42 ACUTE BILATERAL LOW BACK PAIN WITH BILATERAL SCIATICA: ICD-10-CM

## 2021-05-26 DIAGNOSIS — R21 RASH: ICD-10-CM

## 2021-05-26 LAB — SL AMB POCT HEMOGLOBIN AIC: 7.3 (ref ?–6.5)

## 2021-05-26 PROCEDURE — 3725F SCREEN DEPRESSION PERFORMED: CPT | Performed by: NURSE PRACTITIONER

## 2021-05-26 PROCEDURE — 83036 HEMOGLOBIN GLYCOSYLATED A1C: CPT | Performed by: NURSE PRACTITIONER

## 2021-05-26 PROCEDURE — 3074F SYST BP LT 130 MM HG: CPT | Performed by: NURSE PRACTITIONER

## 2021-05-26 PROCEDURE — 3051F HG A1C>EQUAL 7.0%<8.0%: CPT | Performed by: NURSE PRACTITIONER

## 2021-05-26 PROCEDURE — 3079F DIAST BP 80-89 MM HG: CPT | Performed by: NURSE PRACTITIONER

## 2021-05-26 PROCEDURE — 3008F BODY MASS INDEX DOCD: CPT | Performed by: NURSE PRACTITIONER

## 2021-05-26 PROCEDURE — 99396 PREV VISIT EST AGE 40-64: CPT | Performed by: NURSE PRACTITIONER

## 2021-05-26 RX ORDER — FLUTICASONE PROPIONATE 50 MCG
1 SPRAY, SUSPENSION (ML) NASAL DAILY
Qty: 15.8 ML | Refills: 5 | Status: SHIPPED | OUTPATIENT
Start: 2021-05-26 | End: 2022-05-31 | Stop reason: SDUPTHER

## 2021-05-26 RX ORDER — METHYLPREDNISOLONE 4 MG/1
TABLET ORAL
Qty: 21 EACH | Refills: 0 | Status: SHIPPED | OUTPATIENT
Start: 2021-05-26 | End: 2022-01-03 | Stop reason: ALTCHOICE

## 2021-05-26 RX ORDER — NYSTATIN 100000 [USP'U]/G
POWDER TOPICAL 2 TIMES DAILY
Qty: 60 G | Refills: 3 | Status: SHIPPED | OUTPATIENT
Start: 2021-05-26 | End: 2021-11-30 | Stop reason: SDUPTHER

## 2021-05-26 NOTE — PROGRESS NOTES
Assessment/Plan:Patients A1C is 7 3 and she is taking Metformin, Januvia, and Glipizide  Will recheck fasting labs  She did follow up with Cardiology and was started on Crestor and her Zocor was discontinued   Her Lopressor was stopped and was started on Coreg and feels her sugars did go up since starting on this    She has followed up with Hematology for her elevated WBC and is following back up with them in one year  Aure Blanca has still not had her mammogram and GYN exam done     Foot exam normal at last visit  She is up to date on her vaccines   Will obtain [facility] notes  She is due for eye exam  She has not yet completed her Cologuard  Will call a medrol dose pack in for her back and did advise if symptoms continued will send to PT         No problem-specific Assessment & Plan notes found for this encounter           Problem List Items Addressed This Visit        Endocrine    Type 2 diabetes mellitus without complication, without long-term current use of insulin (HCC)    Relevant Orders    POCT hemoglobin A1c (Completed)    Comprehensive metabolic panel    CBC and differential    TSH, 3rd generation with Free T4 reflex       Cardiovascular and Mediastinum    Atherosclerotic heart disease of native coronary artery without angina pectoris    Relevant Orders    Comprehensive metabolic panel    CBC and differential    TSH, 3rd generation with Free T4 reflex    Benign essential hypertension    Relevant Orders    Comprehensive metabolic panel    CBC and differential    TSH, 3rd generation with Free T4 reflex    Left bundle branch block (LBBB)    Relevant Orders    Comprehensive metabolic panel    CBC and differential    TSH, 3rd generation with Free T4 reflex       Nervous and Auditory    Acute bilateral low back pain with bilateral sciatica    Relevant Medications    methylPREDNISolone 4 MG tablet therapy pack    Other Relevant Orders    Comprehensive metabolic panel    CBC and differential    TSH, 3rd generation with Free T4 reflex       Other    Dyslipidemia    Relevant Orders    Comprehensive metabolic panel    CBC and differential    TSH, 3rd generation with Free T4 reflex    Lipid panel    Routine adult health maintenance - Primary    Relevant Orders    Comprehensive metabolic panel    CBC and differential    TSH, 3rd generation with Free T4 reflex    Encounter for screening mammogram for malignant neoplasm of breast    Relevant Orders    Mammo screening bilateral w 3d & cad    Comprehensive metabolic panel    CBC and differential    TSH, 3rd generation with Free T4 reflex    BMI 38 0-38 9,adult    Relevant Orders    Comprehensive metabolic panel    CBC and differential    TSH, 3rd generation with Free T4 reflex      Other Visit Diagnoses     Acute non-recurrent frontal sinusitis        Relevant Medications    fluticasone (FLONASE) 50 mcg/act nasal spray    Other Relevant Orders    Comprehensive metabolic panel    CBC and differential    TSH, 3rd generation with Free T4 reflex    Rash        Relevant Medications    nystatin (MYCOSTATIN) powder    Other Relevant Orders    Comprehensive metabolic panel    CBC and differential    TSH, 3rd generation with Free T4 reflex    Vitamin D deficiency        Relevant Orders    Vitamin D 25 hydroxy            Subjective:      Patient ID: Renny Briggs is a 61 y o  female   Mix is for a routine wellness  She is having flare up of her back pain and is having pain in her legs and lower back  She states she is feeling better but her legs are feeling weak  She states she was on the verge of tears it was so bad  She is seeing Cardiology in July and is checking her sugars and they are stable  She is due for a mammogram and bone density  She did have both covid vaccines  She has not had a recent eye exam  She is deferring GYN  She is due for labs  She offers no other issues        The following portions of the patient's history were reviewed and updated as appropriate:   She  has a past medical history of Cardiac disease, Chronic neck pain, Diabetes mellitus ([facility] [address] ), Hyperlipidemia, Hypertension, Left bundle branch block, Mild depression ([facility] [address] ), and Tonsillar hypertrophy  She   Patient Active Problem List    Diagnosis Date Noted    Routine adult health maintenance 05/26/2021    Encounter for screening mammogram for malignant neoplasm of breast 05/26/2021    Acute bilateral low back pain with bilateral sciatica 05/26/2021    BMI 38 0-38 9,adult 05/26/2021    Presence of drug coated stent in right coronary artery 06/05/2018    Left bundle branch block (LBBB) 06/05/2018    Tobacco abuse 06/05/2018    Leukocytosis 04/24/2018    Type 2 diabetes mellitus without complication, without long-term current use of insulin ([facility] [address] ) 02/28/2017    Obesity 08/09/2013    Anxiety 06/03/2013    Atherosclerotic heart disease of native coronary artery without angina pectoris 06/03/2013    Dyslipidemia 06/03/2013    Benign essential hypertension 05/13/2013     She  has a past surgical history that includes Cardiac surgery and Coronary angioplasty with stent (05/28/2013)  Her family history includes [place]'s disease in her brother; Alcohol abuse in her brother; Arthritis in her father and mother; Bipolar disorder in her brother; Breast cancer in her cousin and maternal aunt; Cardiomyopathy in her mother; Cerebral aneurysm in her brother; Colon cancer (age of onset: 66) in her mother; Coronary artery disease in her father and mother; Diabetes type II in her father and maternal uncle; Fibrocystic breast disease in her mother; Gout in her father; Hyperlipidemia in her father; Hypertension in her mother and sister; Hypothyroidism in her sister; Kidney disease in her father; Lung cancer in her maternal uncle; Other in her brother and sister; Peripheral vascular disease in her maternal aunt; Prostate cancer in her father; Rheum arthritis in her sister; Stroke in her father; Thrombocytopenia in her daughter    She reports that she has been smoking cigarettes  She has a 10 00 pack-year smoking history  She has never used smokeless tobacco  She reports that she does not drink alcohol or use drugs    Current Outpatient Medications   Medication Sig Dispense Refill    ALPRAZolam (XANAX) 0 25 mg tablet Take 0 25 mg by mouth daily at bedtime as needed for anxiety      aspirin 325 mg tablet Take 325 mg by mouth daily      carvedilol (COREG) 12 5 mg tablet take 1 tablet by mouth twice a day with meals 180 tablet 3    cetirizine (ZyrTEC) 10 mg tablet take 1 tablet by mouth once daily 30 tablet 3    Cholecalciferol (VITAMIN D3 PO) Take 2,000 Units by mouth 2 (two) times a day      cyanocobalamin 2000 MCG tablet Take 2,000 mcg by mouth daily      fluticasone (FLONASE) 50 mcg/act nasal spray 1 spray into each nostril daily 15 8 mL 5    furosemide (LASIX) 20 mg tablet take 1 tablet by mouth if needed FOR LOWER EXTREMITY EDEMA 30 tablet 6    glipiZIDE (GLUCOTROL) 5 mg tablet take 1 tablet by mouth daily 30 tablet 3    Glucose Blood (ONETOUCH TEST VI) by In Vitro route      glucose blood (OneTouch Verio) test strip Test 3 times daily 100 each 5    ibuprofen (MOTRIN) 800 mg tablet Take 1 tablet (800 mg total) by mouth every 8 (eight) hours as needed for mild pain 90 tablet 0    Lancets (OneTouch Delica Plus [id number]) MISC use 1 LANCET to TEST BLOOD SUGAR three times a day 100 each 3    losartan (COZAAR) 100 MG tablet take 1 tablet by mouth once daily 90 tablet 3    metFORMIN (GLUCOPHAGE) 500 mg tablet take 2 tablets by mouth every morning and 2 tablets by mouth every evening 120 tablet 5    Multiple Vitamin-Folic Acid TABS Take by mouth      nitroglycerin (NITROSTAT) 0 4 mg SL tablet Place 1 tablet (0 4 mg total) under the tongue every 5 (five) minutes as needed for chest pain 30 tablet 3    nystatin (MYCOSTATIN) powder Apply topically 2 (two) times a day 60 g 3    RA EYE ITCH RELIEF 0 025 % ophthalmic solution place 1 drop into both eyes twice a day  0    rosuvastatin (CRESTOR) 40 MG tablet take 1 tablet by mouth once daily 90 tablet 3    sitaGLIPtin (Januvia) 100 mg tablet Take 1 tablet (100 mg total) by mouth daily 90 tablet 3    Blood Glucose Monitoring Suppl (ONE TOUCH ULTRA 2) w/Device KIT by Does not apply route      methylPREDNISolone 4 MG tablet therapy pack Use as directed on package 21 each 0    metoprolol tartrate (LOPRESSOR) 25 mg tablet take 1 tablet by mouth every 12 hours 180 tablet 3    nicotine (NICODERM CQ) 14 mg/24hr TD 24 hr patch Place 1 patch on the skin every 24 hours (Patient not taking: Reported on 5/2/2019) 28 patch 0     No current facility-administered medications for this visit        Current Outpatient Medications on File Prior to Visit   Medication Sig    ALPRAZolam (XANAX) 0 25 mg tablet Take 0 25 mg by mouth daily at bedtime as needed for anxiety    aspirin 325 mg tablet Take 325 mg by mouth daily    carvedilol (COREG) 12 5 mg tablet take 1 tablet by mouth twice a day with meals    cetirizine (ZyrTEC) 10 mg tablet take 1 tablet by mouth once daily    Cholecalciferol (VITAMIN D3 PO) Take 2,000 Units by mouth 2 (two) times a day    cyanocobalamin 2000 MCG tablet Take 2,000 mcg by mouth daily    furosemide (LASIX) 20 mg tablet take 1 tablet by mouth if needed FOR LOWER EXTREMITY EDEMA    glipiZIDE (GLUCOTROL) 5 mg tablet take 1 tablet by mouth daily    Glucose Blood (ONETOUCH TEST VI) by In Vitro route    glucose blood (OneTouch Verio) test strip Test 3 times daily    ibuprofen (MOTRIN) 800 mg tablet Take 1 tablet (800 mg total) by mouth every 8 (eight) hours as needed for mild pain    Lancets (OneTouch Delica Plus [id number]) MISC use 1 LANCET to TEST BLOOD SUGAR three times a day    losartan (COZAAR) 100 MG tablet take 1 tablet by mouth once daily    metFORMIN (GLUCOPHAGE) 500 mg tablet take 2 tablets by mouth every morning and 2 tablets by mouth every evening    Multiple Vitamin-Folic Acid TABS Take by mouth    nitroglycerin (NITROSTAT) 0 4 mg SL tablet Place 1 tablet (0 4 mg total) under the tongue every 5 (five) minutes as needed for chest pain    RA EYE ITCH RELIEF 0 025 % ophthalmic solution place 1 drop into both eyes twice a day    rosuvastatin (CRESTOR) 40 MG tablet take 1 tablet by mouth once daily    sitaGLIPtin (Januvia) 100 mg tablet Take 1 tablet (100 mg total) by mouth daily    [DISCONTINUED] fluticasone (FLONASE) 50 mcg/act nasal spray 1 spray into each nostril daily    [DISCONTINUED] nystatin (MYCOSTATIN) powder Apply topically 2 (two) times a day    Blood Glucose Monitoring Suppl (ONE TOUCH ULTRA 2) w/Device KIT by Does not apply route    metoprolol tartrate (LOPRESSOR) 25 mg tablet take 1 tablet by mouth every 12 hours    nicotine (NICODERM CQ) 14 mg/24hr TD 24 hr patch Place 1 patch on the skin every 24 hours (Patient not taking: Reported on 5/2/2019)    [DISCONTINUED] furosemide (LASIX) 20 mg tablet Take 1 tablet (20 mg total) by mouth as needed (prn lower extremity edema)    [DISCONTINUED] naproxen (NAPROSYN) 500 mg tablet Take 1 tablet (500 mg total) by mouth 2 (two) times a day with meals for 8 doses (Patient not taking: Reported on 5/26/2021)    [DISCONTINUED] predniSONE 10 mg tablet 30 mg by mouth daily for 3 days, then 20 mg by mouth daily for 3 days, then 10 mg by mouth daily for 3 days, then stop     No current facility-administered medications on file prior to visit  She is allergic to hctz [hydrochlorothiazide]; wellbutrin [bupropion]; and ace inhibitors       Review of Systems   Musculoskeletal: Positive for back pain  All other systems reviewed and are negative  Patient's shoes and socks removed  Right Foot/Ankle   Right Foot Inspection  Skin Exam: skin normal and skin intact no dry skin, no warmth, no callus, no erythema, no maceration, no abnormal color, no pre-ulcer, no ulcer and no callus                          Toe Exam: ROM and strength within normal limits  Sensory   Vibration: intact  Proprioception: intact   Monofilament testing: intact  Vascular  Capillary refills: < 3 seconds  The right DP pulse is 2+  The right PT pulse is 2+  Left Foot/Ankle  Left Foot Inspection  Skin Exam: skin normal and skin intactno dry skin, no warmth, no erythema, no maceration, normal color, no pre-ulcer, no ulcer and no callus                         Toe Exam: ROM and strength within normal limits                   Sensory   Vibration: intact  Proprioception: intact  Monofilament: intact  Vascular  Capillary refills: < 3 seconds  The left DP pulse is 2+  The left PT pulse is 2+  Assign Risk Category:  No deformity present; No loss of protective sensation; No weak pulses       Risk: 0      Objective:      /80 (BP Location: Left arm, Patient Position: Sitting, Cuff Size: Large)   Pulse 93   Temp (!) 96 6 °F (35 9 °C) (Temporal)   Ht 5' 4" (1 626 m)   Wt 101 kg (222 lb 1 6 oz)   SpO2 97%   BMI 38 12 kg/m²          Physical Exam  Vitals signs reviewed  Constitutional:       Appearance: Normal appearance  She is obese  HENT:      Head: Normocephalic and atraumatic  Right Ear: Tympanic membrane, ear canal and external ear normal       Left Ear: Tympanic membrane, ear canal and external ear normal       Nose: Nose normal       Mouth/Throat:      Mouth: Mucous membranes are moist       Pharynx: Oropharynx is clear  Eyes:      Extraocular Movements: Extraocular movements intact  Conjunctiva/sclera: Conjunctivae normal       Pupils: Pupils are equal, round, and reactive to light  Neck:      Musculoskeletal: Normal range of motion and neck supple  Cardiovascular:      Rate and Rhythm: Normal rate and regular rhythm  Pulses: Normal pulses  no weak pulses          Dorsalis pedis pulses are 2+ on the right side and 2+ on the left side  Posterior tibial pulses are 2+ on the right side and 2+ on the left side  Heart sounds: Normal heart sounds  Pulmonary:      Effort: Pulmonary effort is normal       Breath sounds: Normal breath sounds  Abdominal:      General: Abdomen is flat  Bowel sounds are normal       Palpations: Abdomen is soft  Musculoskeletal: Normal range of motion  Feet:    Feet:      Right foot:      Skin integrity: No ulcer, skin breakdown, erythema, warmth, callus or dry skin  Left foot:      Skin integrity: No ulcer, skin breakdown, erythema, warmth, callus or dry skin  Skin:     General: Skin is warm and dry  Capillary Refill: Capillary refill takes less than 2 seconds  Neurological:      General: No focal deficit present  Mental Status: She is alert and oriented to person, place, and time  Mental status is at baseline  Psychiatric:         Mood and Affect: Mood normal          Behavior: Behavior normal          Thought Content: Thought content normal          Judgment: Judgment normal          BMI Counseling: Body mass index is 38 12 kg/m²  The BMI is above normal  Nutrition recommendations include reducing portion sizes, decreasing overall calorie intake, 3-5 servings of fruits/vegetables daily, reducing fast food intake, consuming healthier snacks, decreasing soda and/or juice intake, moderation in carbohydrate intake, increasing intake of lean protein, reducing intake of saturated fat and trans fat and reducing intake of cholesterol

## 2021-05-26 NOTE — PATIENT INSTRUCTIONS
Low Fat Diet   AMBULATORY CARE:   A low-fat diet  is an eating plan that is low in total fat, unhealthy fat, and cholesterol  You may need to follow a low-fat diet if you have trouble digesting or absorbing fat  You may also need to follow this diet if you have high cholesterol  You can also lower your cholesterol by increasing the amount of fiber in your diet  Soluble fiber is a type of fiber that helps to decrease cholesterol levels  Different types of fat in food:   · Limit unhealthy fats  A diet that is high in cholesterol, saturated fat, and trans fat may cause unhealthy cholesterol levels  Unhealthy cholesterol levels increase your risk of heart disease  ? Cholesterol:  Limit intake of cholesterol to less than 200 mg per day  Cholesterol is found in meat, eggs, and dairy  ? Saturated fat:  Limit saturated fat to less than 7% of your total daily calories  Ask your dietitian how many calories you need each day  Saturated fat is found in butter, cheese, ice cream, whole milk, and palm oil  Saturated fat is also found in meat, such as beef, pork, chicken skin, and processed meats  Processed meats include sausage, hot dogs, and bologna  ? Trans fat:  Avoid trans fat as much as possible  Trans fat is used in fried and baked foods  Foods that say trans fat free on the label may still have up to 0 5 grams of trans fat per serving  · Include healthy fats  Replace foods that are high in saturated and trans fat with foods high in healthy fats  This may help to decrease high cholesterol levels  ? Monounsaturated fats: These are found in avocados, nuts, and vegetable oils, such as olive, canola, and sunflower oil  ? Polyunsaturated fats: These can be found in vegetable oils, such as soybean or corn oil  Omega-3 fats can help to decrease the risk of heart disease  Omega-3 fats are found in fish, such as salmon, herring, trout, and tuna   Omega-3 fats can also be found in plant foods, such as walnuts, flaxseed, soybeans, and canola oil  Foods to limit or avoid:   · Grains:      ? Snacks that are made with partially hydrogenated oils, such as chips, regular crackers, and butter-flavored popcorn    ? High-fat baked goods, such as biscuits, croissants, doughnuts, pies, cookies, and pastries    · Dairy:      ? Whole milk, 2% milk, and yogurt and ice cream made with whole milk    ? Half and half creamer, heavy cream, and whipping cream    ? Cheese, cream cheese, and sour cream    · Meats and proteins:      ? High-fat cuts of meat (T-bone steak, regular hamburger, and ribs)    ? Fried meat, poultry (turkey and chicken), and fish    ? Poultry (chicken and turkey) with skin    ? Cold cuts (salami or bologna), hot dogs, molina, and sausage    ? Whole eggs and egg yolks    · Vegetables and fruits with added fat:      ? Fried vegetables or vegetables in butter or high-fat sauces, such as cream or cheese sauces    ? Fried fruit or fruit served with butter or cream    · Fats:      ? Butter, stick margarine, and shortening    ? Coconut, palm oil, and palm kernel oil    Foods to include:   · Grains:      ? Whole-grain breads, cereals, pasta, and brown rice    ? Low-fat crackers and pretzels    · Vegetables and fruits:      ? Fresh, frozen, or canned vegetables (no salt or low-sodium)    ? Fresh, frozen, dried, or canned fruit (canned in light syrup or fruit juice)    ? Avocado    · Low-fat dairy products:      ? Nonfat (skim) or 1% milk    ? Nonfat or low-fat cheese, yogurt, and cottage cheese    · Meats and proteins:      ? Chicken or turkey with no skin    ? Baked or broiled fish    ? Lean beef and pork (loin, round, extra lean hamburger)    ? Beans and peas, unsalted nuts, soy products    ? Egg whites and substitutes    ? Seeds and nuts    · Fats:      ? Unsaturated oil, such as canola, olive, peanut, soybean, or sunflower oil    ? Soft or liquid margarine and vegetable oil spread    ?  Low-fat salad dressing    Other ways to decrease fat:   · Read food labels before you buy foods  Choose foods that have less than 30% of calories from fat  Choose low-fat or fat-free dairy products  Remember that fat free does not mean calorie free  These foods still contain calories, and too many calories can lead to weight gain  · Trim fat from meat and avoid fried food  Trim all visible fat from meat before you cook it  Remove the skin from poultry  Do not ott meat, fish, or poultry  Bake, roast, boil, or broil these foods instead  Avoid fried foods  Eat a baked potato instead of [place] [employer] fries  Steam vegetables instead of sautéing them in butter  · Add less fat to foods  Use imitation molina bits on salads and baked potatoes instead of regular molina bits  Use fat-free or low-fat salad dressings instead of regular dressings  Use low-fat or nonfat butter-flavored topping instead of regular butter or margarine on popcorn and other foods  Ways to decrease fat in recipes:  Replace high-fat ingredients with low-fat or nonfat ones  This may cause baked goods to be drier than usual  You may need to use nonfat cooking spray on pans to prevent food from sticking  You also may need to change the amount of other ingredients, such as water, in the recipe  Try the following:  · Use low-fat or light margarine instead of regular margarine or shortening  · Use lean ground turkey breast or chicken, or lean ground beef (less than 5% fat) instead of hamburger  · Add 1 teaspoon of canola oil to 8 ounces of skim milk instead of using cream or half and half  · Use grated zucchini, carrots, or apples in breads instead of coconut  · Use blenderized, low-fat cottage cheese, plain tofu, or low-fat ricotta cheese instead of cream cheese  · Use 1 egg white and 1 teaspoon of canola oil, or use ¼ cup (2 ounces) of fat-free egg substitute instead of a whole egg       · Replace half of the oil that is called for in a recipe with applesauce when you bake  Use 3 tablespoons of cocoa powder and 1 tablespoon of canola oil instead of a square of baking chocolate  How to increase fiber:  Eat enough high-fiber foods to get 20 to 30 grams of fiber every day  Slowly increase your fiber intake to avoid stomach cramps, gas, and other problems  · Eat 3 ounces of whole-grain foods each day  An ounce is about 1 slice of bread  Eat whole-grain breads, such as whole-wheat bread  Whole wheat, whole-wheat flour, or other whole grains should be listed as the first ingredient on the food label  Replace white flour with whole-grain flour or use half of each in recipes  Whole-grain flour is heavier than white flour, so you may have to add more yeast or baking powder  · Eat a high-fiber cereal for breakfast   Oatmeal is a good source of soluble fiber  Look for cereals that have bran or fiber in the name  Choose whole-grain products, such as brown rice, barley, and whole-wheat pasta  · Eat more beans, peas, and lentils  For example, add beans to soups or salads  Eat at least 5 cups of fruits and vegetables each day  Eat fruits and vegetables with the peel because the peel is high in fiber  © Copyright [address] Information is for End User's use only and may not be sold, redistributed or otherwise used for commercial purposes  All illustrations and images included in CareNotes® are the copyrighted property of A D A M , [employer]  or [address] [place] [address]  The above information is an  only  It is not intended as medical advice for individual conditions or treatments  Talk to your doctor, nurse or pharmacist before following any medical regimen to see if it is safe and effective for you  Heart Healthy Diet   AMBULATORY CARE:   A heart healthy diet  is an eating plan low in unhealthy fats and sodium (salt)  The plan is high in healthy fats and fiber   A heart healthy diet helps improve your cholesterol levels and lowers your risk for heart disease and stroke  A dietitian will teach you how to read and understand food labels  Heart healthy diet guidelines to follow:   · Choose foods that contain healthy fats  ? Unsaturated fats  include monounsaturated and polyunsaturated fats  Unsaturated fat is found in foods such as soybean, canola, olive, corn, and safflower oils  It is also found in soft tub margarine that is made with liquid vegetable oil  ? Omega-3 fat  is found in certain fish, such as salmon, tuna, and trout, and in walnuts and flaxseed  Eat fish high in omega-3 fats at least 2 times a week  · Get 20 to 30 grams of fiber each day  Fruits, vegetables, whole-grain foods, and legumes (cooked beans) are good sources of fiber  · Limit or do not have unhealthy fats  ? Cholesterol  is found in animal foods, such as eggs and lobster, and in dairy products made from whole milk  Limit cholesterol to less than 200 mg each day  ? Saturated fat  is found in meats, such as molina and hamburger  It is also found in chicken or turkey skin, whole milk, and butter  Limit saturated fat to less than 7% of your total daily calories  ? Trans fat  is found in packaged foods, such as potato chips and cookies  It is also in hard margarine, some fried foods, and shortening  Do not eat foods that contain trans fats  · Limit sodium as directed  You may be told to limit sodium to 2,000 to 2,300 mg each day  Choose low-sodium or no-salt-added foods  Add little or no salt to food you prepare  Use herbs and spices in place of salt  Include the following in your heart healthy plan:  Ask your dietitian or healthcare provider how many servings to have from each of the following food groups:  · Grains:      ? Whole-wheat breads, cereals, and pastas, and brown rice    ? Low-fat, low-sodium crackers and chips    · Vegetables:      ? Broccoli, green beans, green peas, and spinach    ? Collards, kale, and lima beans    ?  Carrots, sweet potatoes, tomatoes, and peppers    ? Canned vegetables with no salt added    · Fruits:      ? Bananas, peaches, pears, and pineapple    ? Grapes, raisins, and dates    ? Oranges, tangerines, grapefruit, orange juice, and grapefruit juice    ? Apricots, mangoes, melons, and papaya    ? Raspberries and strawberries    ? Canned fruit with no added sugar    · Low-fat dairy:      ? Nonfat (skim) milk, 1% milk, and low-fat almond, cashew, or soy milks fortified with calcium    ? Low-fat cheese, regular or frozen yogurt, and cottage cheese    · Meats and proteins:      ? Lean cuts of beef and pork (loin, leg, round), skinless chicken and turkey    ? Legumes, soy products, egg whites, or nuts    Limit or do not include the following in your heart healthy plan:   · Unhealthy fats and oils:      ? Whole or 2% milk, cream cheese, sour cream, or cheese    ? High-fat cuts of beef (T-bone steaks, ribs), chicken or turkey with skin, and organ meats such as liver    ? Butter, stick margarine, shortening, and cooking oils such as coconut or palm oil    · Foods and liquids high in sodium:      ? Packaged foods, such as frozen dinners, cookies, macaroni and cheese, and cereals with more than 300 mg of sodium per serving    ? Vegetables with added sodium, such as instant potatoes, vegetables with added sauces, or regular canned vegetables    ? Cured or smoked meats, such as hot dogs, molina, and sausage    ? High-sodium ketchup, barbecue sauce, salad dressing, pickles, olives, soy sauce, or miso    · Foods and liquids high in sugar:      ? Candy, cake, cookies, pies, or doughnuts    ? Soft drinks (soda), sports drinks, or sweetened tea    ? Canned or dry mixes for cakes, soups, sauces, or gravies    Other healthy heart guidelines:   · Do not smoke  Nicotine and other chemicals in cigarettes and cigars can cause lung and heart damage  Ask your healthcare provider for information if you currently smoke and need help to quit  E-cigarettes or smokeless tobacco still contain nicotine  Talk to your healthcare provider before you use these products  · Limit or do not drink alcohol as directed  Alcohol can damage your heart and raise your blood pressure  Your healthcare provider may give you specific daily and weekly limits  The general recommended limit is 1 drink a day for women 21 or older and for men 72 or older  Do not have more than 3 drinks in a day or 7 in a week  The recommended limit is 2 drinks a day for men 24to 59years of age  Do not have more than 4 drinks in a day or 14 in a week  A drink of alcohol is 12 ounces of beer, 5 ounces of wine, or 1½ ounces of liquor  · Exercise regularly  Exercise can help you maintain a healthy weight and improve your blood pressure and cholesterol levels  Regular exercise can also decrease your risk for heart problems  Ask your healthcare provider about the best exercise plan for you  Do not start an exercise program without asking your healthcare provider  Follow up with your doctor or cardiologist as directed:  Write down your questions so you remember to ask them during your visits  © Copyright [address] Information is for End User's use only and may not be sold, redistributed or otherwise used for commercial purposes  All illustrations and images included in CareNotes® are the copyrighted property of A D A M [employer]  or [address] [place] [address]  The above information is an  only  It is not intended as medical advice for individual conditions or treatments  Talk to your doctor, nurse or pharmacist before following any medical regimen to see if it is safe and effective for you

## 2021-06-01 ENCOUNTER — TELEPHONE (OUTPATIENT)
Dept: ADMINISTRATIVE | Facility: OTHER | Age: 61
End: 2021-06-01

## 2021-06-01 NOTE — TELEPHONE ENCOUNTER
----- Message from Vee Fox sent at 6/1/2021  9:49 AM EDT -----  06/01/21 9:49 AM    Hello, our patient Joan Garland has had Diabetic Eye Exam completed/performed  Please assist in updating the patient chart by making an External outreach to [facility] located in [place]         Thank you,  Dwayne Boo MA   [facility] [place]

## 2021-06-01 NOTE — LETTER
Diabetic Eye Exam Form    Date Requested: 21  Patient: Glynn Castaneda  Patient : [id number]   Referring Provider: ANA Lim Junior    Dilated Retinal Exam, Optomap-Iris Exam, or Fundus Photography Done         Yes ([place] one above)         No     Date of Diabetic Eye Exam ______________________________  Left Eye      Exam did show retinopathy    Exam did not show retinopathy         Mild       Moderate       None       Proliferative       Severe     Right Eye     Exam did show retinopathy    Exam did not show retinopathy         Mild       Moderate       None       Proliferative       Severe     Comments __________________________________________________________    Practice Providing Exam ______________________________________________    Exam Performed By (print name) _______________________________________      Provider Signature ___________________________________________________      These reports are needed for  compliance    Please fax this completed form and a copy of the Diabetic Eye Exam report to our office located at [place] [address] as soon as possible to [phone] zeina Hidalgo: Phone [phone]    We thank you for your assistance in treating our mutual patient

## 2021-06-01 NOTE — TELEPHONE ENCOUNTER
Upon review of the In Basket request and the patient's chart, initial outreach has been made via fax, please see Contacts section for details       Thank you  Maria Guadalupe Prince

## 2021-06-04 NOTE — TELEPHONE ENCOUNTER
Upon review of the In Basket request we were able to locate, review, and update the patient chart as requested for Diabetic Eye Exam     Any additional questions or concerns should be emailed to the Practice Liaisons via [email or web address] email, please do not reply via In Basket      Thank you  Vincenzo Vazquez

## 2021-06-28 DIAGNOSIS — E11.00 TYPE 2 DIABETES MELLITUS WITH HYPEROSMOLARITY WITHOUT COMA, WITHOUT LONG-TERM CURRENT USE OF INSULIN (HCC): ICD-10-CM

## 2021-06-28 RX ORDER — GLIPIZIDE 5 MG/1
TABLET ORAL
Qty: 30 TABLET | Refills: 3 | Status: SHIPPED | OUTPATIENT
Start: 2021-06-28 | End: 2021-11-01

## 2021-07-05 DIAGNOSIS — E13.9 DIABETES 1.5, MANAGED AS TYPE 2 (HCC): ICD-10-CM

## 2021-07-06 RX ORDER — SITAGLIPTIN 100 MG/1
TABLET, FILM COATED ORAL
Qty: 90 TABLET | Refills: 3 | Status: SHIPPED | OUTPATIENT
Start: 2021-07-06 | End: 2021-11-30

## 2021-07-28 NOTE — PROGRESS NOTES
Cardiology Follow Up    [facility]  1960  [id number]  [address] [address]    1  Atherosclerosis of native coronary artery of native heart without angina pectoris     2  Presence of drug coated stent in right coronary artery     3  Benign essential hypertension     4  Dyslipidemia     5  Left bundle branch block (LBBB)  POCT ECG    Echo complete with contrast if indicated   6  Tobacco abuse  nicotine (NICODERM CQ) 7 mg/24hr TD 24 hr patch   7  BMI 38 0-38 9,adult         Discussion/Summary:  Ms Lakeshia Allen is a pleasant 44-year-old female who presents to the office today for routine follow-up  From a cardiac standpoint she offers no complaints  She is sedentary but otherwise asymptomatic  Her blood pressure is slightly elevated in the office today  It was well controlled when she saw her primary care provider  No changes were made to her medication regimen  She does have the capability to check it at home and I have asked her to do so  A low-salt diet was reinforced  She also has untreated obstructive sleep apnea which is likely contributor  She declines re-evaluation as she states she could not tolerate a mask  I have no recent assessment of her lipids  Her last lipids from 2019 reveal acceptable numbers with the exception of a low HDL for which therapeutic lifestyle modifications were recommended  She is due to have these reassessed by her primary care provider in the near future and I will await the results  Smoking cessation was again advised  She apparently had a adverse reaction to Wellbutrin  She does not wish to attempt Chantix given her father did not tolerate this well  A prescription was provided for nicotine patches      After her last visit given her left bundle-branch block I advised a repeat echocardiogram which she never had performed  Another prescription was provided  I will see her back in the office in one year  Interval History:   Ms Octaviano Alvarez is a pleasant 63-year-old female who presents to the office today for routine follow-up  Since her last visit she has been feeling well  She is sedentary due to issues with her back  With the activity she is able to perform she denies any exertional chest pain or shortness of breath  She denies any signs or symptoms of congestive heart failure including paroxysmal nocturnal dyspnea, orthopnea, acute weight gain or increasing abdominal girth  She does report lower extremity edema, worse as the day goes on which improves upon awakening  She denies lightheadedness, syncope or presyncope  She denies palpitations or symptoms of claudication  She continues to smoke about a half a pack of cigarettes per day      Problem List     Anxiety    Atherosclerotic heart disease of native coronary artery without angina pectoris    Overview Signed 3/19/2018 12:47 PM by ANA Dykes     Description: s/p AMERICA ([employer]ience) mid RCA 05/28/2013         Dyslipidemia    Hypertension    Obesity    Type 2 diabetes mellitus (HCC)    Leukocytosis        Past Medical History:   Diagnosis Date    Cardiac disease     MI May 25, 2013    Chronic neck pain     Last Assessed:11/28/16    Diabetes mellitus ([facility] [address] )     Hyperlipidemia     Hypertension     Left bundle branch block     Last Assessed:12/5/13    Mild depression ([facility] [address] )     Last Assessed:11/28/16    Tonsillar hypertrophy     Last Assessed:8/27/13     Social History     Socioeconomic History    Marital status:      Spouse name: Not on file    Number of children: 2    Years of education: Not on file    Highest education level: Not on file   Occupational History    Occupation: admin      Comment: [place]   Tobacco Use    Smoking status: Current Every Day Smoker     Packs/day: 0 50     Years: 20 00     Pack years: 10  00     Types: Cigarettes    Smokeless tobacco: Never Used   Vaping Use    Vaping Use: Never used   Substance and Sexual Activity    Alcohol use: No    Drug use: No     Comment: occassionally     Sexual activity: Not on file   Other Topics Concern    Not on file   Social History Narrative    Not on file     Social Determinants of Health     Financial Resource Strain:     Difficulty of Paying Living Expenses:    Food Insecurity:     Worried About Running Out of Food in the Last Year:     Ran Out of Food in the Last Year:    Transportation Needs:     Lack of Transportation (Medical):      Lack of Transportation (Non-Medical):    Physical Activity:     Days of Exercise per Week:     Minutes of Exercise per Session:    Stress:     Feeling of Stress :    Social Connections:     Frequency of Communication with Friends and Family:     Frequency of Social Gatherings with Friends and Family:     Attends Holiness Services:     Active Member of Clubs or Organizations:     Attends Club or Organization Meetings:     Marital Status:    Intimate Partner Violence:     Fear of Current or Ex-Partner:     Emotionally Abused:     Physically Abused:     Sexually Abused:       Family History   Problem Relation Age of Onset    Arthritis Mother     Colon cancer Mother 66    Coronary artery disease Mother     Fibrocystic breast disease Mother     Hypertension Mother     Cardiomyopathy Mother         Ischemic Dilated Cardiomyopathy    Arthritis Father     Kidney disease Father         Chronic (NKF classification)    Coronary artery disease Father     Gout Father     Prostate cancer Father     Hyperlipidemia Father         Pure Hypercholesterolemia    Stroke Father         Stroke Syndrome    Diabetes type II Father     Hypertension Sister     Hypothyroidism Sister     Other Sister         IVP- Solitary Kidney Calculus    Rheum arthritis Sister     Alcohol abuse Brother     Bipolar disorder Brother         NOS    Other Brother         IVP- Solitary Kidney Calculus    Francois's disease Brother     Cerebral aneurysm Brother         Ruptured    Breast cancer Maternal Aunt     Peripheral vascular disease Maternal Aunt     Breast cancer Cousin         2 maternal cousins    Lung cancer Maternal Uncle     Diabetes type II Maternal Uncle     Thrombocytopenia Daughter         Idiopathic Thrombocytopenia Purpura at 10 yo     Past Surgical History:   Procedure Laterality Date    CARDIAC SURGERY      1 stent placed    CORONARY ANGIOPLASTY WITH STENT PLACEMENT  05/28/2013    per [employer]: Cath stent 1 type drug-eluting, mild RCA       Current Outpatient Medications:     ALPRAZolam (XANAX) 0 25 mg tablet, Take 0 25 mg by mouth daily at bedtime as needed for anxiety, Disp: , Rfl:     aspirin 325 mg tablet, Take 325 mg by mouth daily, Disp: , Rfl:     carvedilol (COREG) 12 5 mg tablet, take 1 tablet by mouth twice a day with meals, Disp: 180 tablet, Rfl: 3    cetirizine (ZyrTEC) 10 mg tablet, take 1 tablet by mouth once daily, Disp: 30 tablet, Rfl: 3    Cholecalciferol (VITAMIN D3 PO), Take 2,000 Units by mouth 2 (two) times a day, Disp: , Rfl:     cyanocobalamin 2000 MCG tablet, Take 2,000 mcg by mouth daily, Disp: , Rfl:     fluticasone (FLONASE) 50 mcg/act nasal spray, 1 spray into each nostril daily, Disp: 15 8 mL, Rfl: 5    furosemide (LASIX) 20 mg tablet, take 1 tablet by mouth if needed FOR LOWER EXTREMITY EDEMA, Disp: 30 tablet, Rfl: 6    glipiZIDE (GLUCOTROL) 5 mg tablet, take 1 tablet by mouth daily, Disp: 30 tablet, Rfl: 3    Glucose Blood (ONETOUCH TEST VI), by In Vitro route, Disp: , Rfl:     glucose blood (OneTouch Verio) test strip, Test 3 times daily, Disp: 100 each, Rfl: 5    ibuprofen (MOTRIN) 800 mg tablet, Take 1 tablet (800 mg total) by mouth every 8 (eight) hours as needed for mild pain, Disp: 90 tablet, Rfl: 0    Januvia 100 MG tablet, take 1 tablet by mouth once daily, Disp: 90 tablet, Rfl: 3    Lancets (OneTouch Delica Plus OGABNO83W) MISC, use 1 LANCET to TEST BLOOD SUGAR three times a day, Disp: 100 each, Rfl: 3    losartan (COZAAR) 100 MG tablet, take 1 tablet by mouth once daily, Disp: 90 tablet, Rfl: 3    metFORMIN (GLUCOPHAGE) 500 mg tablet, take 2 tablets by mouth every morning and 2 tablets by mouth every evening (Patient taking differently: take 1 tablet by mouth every morning and 2 tablets by mouth every evening), Disp: 120 tablet, Rfl: 5    Multiple Vitamin-Folic Acid TABS, Take by mouth, Disp: , Rfl:     nitroglycerin (NITROSTAT) 0 4 mg SL tablet, Place 1 tablet (0 4 mg total) under the tongue every 5 (five) minutes as needed for chest pain, Disp: 30 tablet, Rfl: 3    nystatin (MYCOSTATIN) powder, Apply topically 2 (two) times a day, Disp: 60 g, Rfl: 3    RA EYE ITCH RELIEF 0 025 % ophthalmic solution, place 1 drop into both eyes twice a day, Disp: , Rfl: 0    rosuvastatin (CRESTOR) 40 MG tablet, take 1 tablet by mouth once daily, Disp: 90 tablet, Rfl: 3    Blood Glucose Monitoring Suppl (ONE TOUCH ULTRA 2) w/Device KIT, by Does not apply route, Disp: , Rfl:     methylPREDNISolone 4 MG tablet therapy pack, Use as directed on package, Disp: 21 each, Rfl: 0    nicotine (NICODERM CQ) 14 mg/24hr TD 24 hr patch, Place 1 patch on the skin every 24 hours (Patient not taking: Reported on 5/2/2019), Disp: 28 patch, Rfl: 0    nicotine (NICODERM CQ) 7 mg/24hr TD 24 hr patch, Place 1 patch on the skin every 24 hours, Disp: 28 patch, Rfl: 3  Allergies   Allergen Reactions    Hctz [Hydrochlorothiazide] Shortness Of Breath and Dizziness    Wellbutrin [Bupropion] Shortness Of Breath     Category:  Adverse Reaction;     Ace Inhibitors Cough       Labs:     Chemistry        Component Value Date/Time    K 4 0 10/30/2019 1024     10/30/2019 1024    CO2 23 10/30/2019 1024    BUN 11 10/30/2019 1024    CREATININE 1 01 10/30/2019 1024        Component Value Date/Time CALCIUM 8 4 10/30/2019 1024    ALKPHOS 118 (H) 10/30/2019 1024    AST 17 10/30/2019 1024    ALT 30 10/30/2019 1024            No results found for: CHOL  Lab Results   Component Value Date    HDL 39 (L) 10/30/2019    HDL 40 04/30/2019    HDL 39 (L) 06/19/2018     Lab Results   Component Value Date    LDLCALC 34 10/30/2019    LDLCALC 45 04/30/2019    LDLCALC 62 06/19/2018     Lab Results   Component Value Date    TRIG 113 10/30/2019    TRIG 106 04/30/2019    TRIG 152 (H) 06/19/2018     No results found for: CHOLHDL    Imaging: No results found  ECG:  Normal sinus rhythm, left bundle branch block       Review of Systems   Cardiovascular: Positive for leg swelling  Negative for claudication, cyanosis, near-syncope and orthopnea  All other systems reviewed and are negative  Vitals:    07/29/21 1239   BP: 140/80   Pulse: 86     Vitals:    07/29/21 1239   Weight: 99 2 kg (218 lb 12 8 oz)     Height: 5' 4" (162 6 cm)   Body mass index is 37 56 kg/m²      Physical Exam:  General:  Alert and cooperative, appears stated age  HEENT:  PERRLA, EOMI, no scleral icterus, no conjunctival pallor  Neck:  No lymphadenopathy, no thyromegaly, no carotid bruits, no elevated JVP  Heart:  Regular rate and rhythm, normal S1/S2, no S3/S4, no murmur  Lungs:  Clear to auscultation bilaterally   Abdomen:  Soft, non-tender, positive bowel sounds, no rebound or guarding,   no organomegaly   Extremities:  No clubbing, cyanosis or edema   Vascular:  2+ pedal pulses  Skin:  No rashes or lesions on exposed skin  Neurologic:  Cranial nerves II-XII grossly intact without focal deficits

## 2021-07-29 ENCOUNTER — OFFICE VISIT (OUTPATIENT)
Dept: CARDIOLOGY CLINIC | Facility: HOSPITAL | Age: 61
End: 2021-07-29
Payer: COMMERCIAL

## 2021-07-29 VITALS
BODY MASS INDEX: 37.36 KG/M2 | SYSTOLIC BLOOD PRESSURE: 140 MMHG | HEIGHT: 64 IN | WEIGHT: 218.8 LBS | HEART RATE: 86 BPM | DIASTOLIC BLOOD PRESSURE: 80 MMHG

## 2021-07-29 DIAGNOSIS — I44.7 LEFT BUNDLE BRANCH BLOCK (LBBB): ICD-10-CM

## 2021-07-29 DIAGNOSIS — E78.5 DYSLIPIDEMIA: ICD-10-CM

## 2021-07-29 DIAGNOSIS — I25.10 ATHEROSCLEROSIS OF NATIVE CORONARY ARTERY OF NATIVE HEART WITHOUT ANGINA PECTORIS: Primary | ICD-10-CM

## 2021-07-29 DIAGNOSIS — Z72.0 TOBACCO ABUSE: ICD-10-CM

## 2021-07-29 DIAGNOSIS — I10 BENIGN ESSENTIAL HYPERTENSION: ICD-10-CM

## 2021-07-29 DIAGNOSIS — Z95.5 PRESENCE OF DRUG COATED STENT IN RIGHT CORONARY ARTERY: ICD-10-CM

## 2021-07-29 PROCEDURE — 3008F BODY MASS INDEX DOCD: CPT | Performed by: INTERNAL MEDICINE

## 2021-07-29 PROCEDURE — 93000 ELECTROCARDIOGRAM COMPLETE: CPT | Performed by: INTERNAL MEDICINE

## 2021-07-29 PROCEDURE — 3077F SYST BP >= 140 MM HG: CPT | Performed by: INTERNAL MEDICINE

## 2021-07-29 PROCEDURE — 3079F DIAST BP 80-89 MM HG: CPT | Performed by: INTERNAL MEDICINE

## 2021-07-29 PROCEDURE — 99214 OFFICE O/P EST MOD 30 MIN: CPT | Performed by: INTERNAL MEDICINE

## 2021-08-31 DIAGNOSIS — I44.7 LEFT BUNDLE BRANCH BLOCK (LBBB): ICD-10-CM

## 2021-08-31 RX ORDER — FUROSEMIDE 20 MG/1
TABLET ORAL
Qty: 30 TABLET | Refills: 6 | Status: SHIPPED | OUTPATIENT
Start: 2021-08-31 | End: 2022-01-03 | Stop reason: ALTCHOICE

## 2021-10-04 DIAGNOSIS — E11.00 TYPE 2 DIABETES MELLITUS WITH HYPEROSMOLARITY WITHOUT COMA, WITHOUT LONG-TERM CURRENT USE OF INSULIN (HCC): Primary | ICD-10-CM

## 2021-10-27 DIAGNOSIS — H04.129 DRY EYE: ICD-10-CM

## 2021-10-27 DIAGNOSIS — H57.89 EYE DRAINAGE: ICD-10-CM

## 2021-10-29 RX ORDER — CETIRIZINE HYDROCHLORIDE 10 MG/1
TABLET ORAL
Qty: 30 TABLET | Refills: 3 | Status: SHIPPED | OUTPATIENT
Start: 2021-10-29 | End: 2022-04-14

## 2021-10-30 DIAGNOSIS — E11.00 TYPE 2 DIABETES MELLITUS WITH HYPEROSMOLARITY WITHOUT COMA, WITHOUT LONG-TERM CURRENT USE OF INSULIN (HCC): ICD-10-CM

## 2021-11-01 RX ORDER — GLIPIZIDE 5 MG/1
TABLET ORAL
Qty: 30 TABLET | Refills: 3 | Status: SHIPPED | OUTPATIENT
Start: 2021-11-01 | End: 2021-11-15

## 2021-11-14 DIAGNOSIS — E11.00 TYPE 2 DIABETES MELLITUS WITH HYPEROSMOLARITY WITHOUT COMA, WITHOUT LONG-TERM CURRENT USE OF INSULIN (HCC): ICD-10-CM

## 2021-11-15 RX ORDER — GLIPIZIDE 5 MG/1
TABLET ORAL
Qty: 30 TABLET | Refills: 3 | Status: SHIPPED | OUTPATIENT
Start: 2021-11-15 | End: 2022-03-25

## 2021-11-22 ENCOUNTER — APPOINTMENT (OUTPATIENT)
Dept: LAB | Facility: HOSPITAL | Age: 61
End: 2021-11-22
Payer: COMMERCIAL

## 2021-11-22 DIAGNOSIS — I25.10 ATHEROSCLEROSIS OF NATIVE CORONARY ARTERY OF NATIVE HEART WITHOUT ANGINA PECTORIS: ICD-10-CM

## 2021-11-22 DIAGNOSIS — M54.42 ACUTE BILATERAL LOW BACK PAIN WITH BILATERAL SCIATICA: ICD-10-CM

## 2021-11-22 DIAGNOSIS — E11.9 TYPE 2 DIABETES MELLITUS WITHOUT COMPLICATION, WITHOUT LONG-TERM CURRENT USE OF INSULIN (HCC): ICD-10-CM

## 2021-11-22 DIAGNOSIS — I10 BENIGN ESSENTIAL HYPERTENSION: ICD-10-CM

## 2021-11-22 DIAGNOSIS — I44.7 LEFT BUNDLE BRANCH BLOCK (LBBB): ICD-10-CM

## 2021-11-22 DIAGNOSIS — J01.10 ACUTE NON-RECURRENT FRONTAL SINUSITIS: ICD-10-CM

## 2021-11-22 DIAGNOSIS — E78.5 DYSLIPIDEMIA: ICD-10-CM

## 2021-11-22 DIAGNOSIS — Z12.31 ENCOUNTER FOR SCREENING MAMMOGRAM FOR MALIGNANT NEOPLASM OF BREAST: ICD-10-CM

## 2021-11-22 DIAGNOSIS — R21 RASH: ICD-10-CM

## 2021-11-22 DIAGNOSIS — E55.9 VITAMIN D DEFICIENCY: ICD-10-CM

## 2021-11-22 DIAGNOSIS — M54.41 ACUTE BILATERAL LOW BACK PAIN WITH BILATERAL SCIATICA: ICD-10-CM

## 2021-11-22 DIAGNOSIS — Z00.00 ROUTINE ADULT HEALTH MAINTENANCE: ICD-10-CM

## 2021-11-22 LAB
25(OH)D3 SERPL-MCNC: 47.5 NG/ML (ref 30–100)
ALBUMIN SERPL BCP-MCNC: 3.9 G/DL (ref 3.5–5)
ALP SERPL-CCNC: 102 U/L (ref 46–116)
ALT SERPL W P-5'-P-CCNC: 42 U/L (ref 12–78)
ANION GAP SERPL CALCULATED.3IONS-SCNC: 9 MMOL/L (ref 4–13)
AST SERPL W P-5'-P-CCNC: 26 U/L (ref 5–45)
BASOPHILS # BLD AUTO: 0.08 THOUSANDS/ΜL (ref 0–0.1)
BASOPHILS NFR BLD AUTO: 1 % (ref 0–1)
BILIRUB SERPL-MCNC: 0.27 MG/DL (ref 0.2–1)
BUN SERPL-MCNC: 12 MG/DL (ref 5–25)
CALCIUM SERPL-MCNC: 8.6 MG/DL (ref 8.3–10.1)
CHLORIDE SERPL-SCNC: 104 MMOL/L (ref 100–108)
CHOLEST SERPL-MCNC: 101 MG/DL
CO2 SERPL-SCNC: 25 MMOL/L (ref 21–32)
CREAT SERPL-MCNC: 0.99 MG/DL (ref 0.6–1.3)
EOSINOPHIL # BLD AUTO: 0.25 THOUSAND/ΜL (ref 0–0.61)
EOSINOPHIL NFR BLD AUTO: 2 % (ref 0–6)
ERYTHROCYTE [DISTWIDTH] IN BLOOD BY AUTOMATED COUNT: 16 % (ref 11.6–15.1)
GFR SERPL CREATININE-BSD FRML MDRD: 62 ML/MIN/1.73SQ M
GLUCOSE P FAST SERPL-MCNC: 201 MG/DL (ref 65–99)
HCT VFR BLD AUTO: 45.1 % (ref 34.8–46.1)
HDLC SERPL-MCNC: 43 MG/DL
HGB BLD-MCNC: 14.1 G/DL (ref 11.5–15.4)
IMM GRANULOCYTES # BLD AUTO: 0.05 THOUSAND/UL (ref 0–0.2)
IMM GRANULOCYTES NFR BLD AUTO: 0 % (ref 0–2)
LDLC SERPL CALC-MCNC: 28 MG/DL (ref 0–100)
LYMPHOCYTES # BLD AUTO: 3.29 THOUSANDS/ΜL (ref 0.6–4.47)
LYMPHOCYTES NFR BLD AUTO: 26 % (ref 14–44)
MCH RBC QN AUTO: 26.7 PG (ref 26.8–34.3)
MCHC RBC AUTO-ENTMCNC: 31.3 G/DL (ref 31.4–37.4)
MCV RBC AUTO: 85 FL (ref 82–98)
MONOCYTES # BLD AUTO: 0.96 THOUSAND/ΜL (ref 0.17–1.22)
MONOCYTES NFR BLD AUTO: 7 % (ref 4–12)
NEUTROPHILS # BLD AUTO: 8.28 THOUSANDS/ΜL (ref 1.85–7.62)
NEUTS SEG NFR BLD AUTO: 64 % (ref 43–75)
NONHDLC SERPL-MCNC: 58 MG/DL
NRBC BLD AUTO-RTO: 0 /100 WBCS
PLATELET # BLD AUTO: 261 THOUSANDS/UL (ref 149–390)
PMV BLD AUTO: 10.4 FL (ref 8.9–12.7)
POTASSIUM SERPL-SCNC: 4 MMOL/L (ref 3.5–5.3)
PROT SERPL-MCNC: 7.4 G/DL (ref 6.4–8.2)
RBC # BLD AUTO: 5.28 MILLION/UL (ref 3.81–5.12)
SODIUM SERPL-SCNC: 138 MMOL/L (ref 136–145)
TRIGL SERPL-MCNC: 150 MG/DL
TSH SERPL DL<=0.05 MIU/L-ACNC: 2.87 UIU/ML (ref 0.36–3.74)
WBC # BLD AUTO: 12.91 THOUSAND/UL (ref 4.31–10.16)

## 2021-11-22 PROCEDURE — 36415 COLL VENOUS BLD VENIPUNCTURE: CPT

## 2021-11-22 PROCEDURE — 85025 COMPLETE CBC W/AUTO DIFF WBC: CPT

## 2021-11-22 PROCEDURE — 80061 LIPID PANEL: CPT

## 2021-11-22 PROCEDURE — 82306 VITAMIN D 25 HYDROXY: CPT

## 2021-11-22 PROCEDURE — 80053 COMPREHEN METABOLIC PANEL: CPT

## 2021-11-22 PROCEDURE — 84443 ASSAY THYROID STIM HORMONE: CPT

## 2021-11-30 ENCOUNTER — OFFICE VISIT (OUTPATIENT)
Dept: INTERNAL MEDICINE CLINIC | Facility: CLINIC | Age: 61
End: 2021-11-30
Payer: COMMERCIAL

## 2021-11-30 VITALS
HEIGHT: 64 IN | OXYGEN SATURATION: 97 % | SYSTOLIC BLOOD PRESSURE: 142 MMHG | HEART RATE: 99 BPM | BODY MASS INDEX: 37.56 KG/M2 | DIASTOLIC BLOOD PRESSURE: 84 MMHG | WEIGHT: 220 LBS | TEMPERATURE: 97.1 F

## 2021-11-30 DIAGNOSIS — E11.9 TYPE 2 DIABETES MELLITUS WITHOUT COMPLICATION, WITHOUT LONG-TERM CURRENT USE OF INSULIN (HCC): Primary | ICD-10-CM

## 2021-11-30 DIAGNOSIS — Z23 NEED FOR PNEUMOCOCCAL VACCINATION: ICD-10-CM

## 2021-11-30 DIAGNOSIS — B37.2 SKIN YEAST INFECTION: ICD-10-CM

## 2021-11-30 DIAGNOSIS — E78.5 DYSLIPIDEMIA: ICD-10-CM

## 2021-11-30 DIAGNOSIS — Z12.11 SCREENING FOR COLON CANCER: ICD-10-CM

## 2021-11-30 DIAGNOSIS — I25.10 ATHEROSCLEROSIS OF NATIVE CORONARY ARTERY OF NATIVE HEART WITHOUT ANGINA PECTORIS: ICD-10-CM

## 2021-11-30 DIAGNOSIS — I10 BENIGN ESSENTIAL HYPERTENSION: ICD-10-CM

## 2021-11-30 DIAGNOSIS — R21 RASH: ICD-10-CM

## 2021-11-30 DIAGNOSIS — Z23 NEED FOR INFLUENZA VACCINATION: ICD-10-CM

## 2021-11-30 DIAGNOSIS — H93.11 TINNITUS, RIGHT EAR: ICD-10-CM

## 2021-11-30 LAB — SL AMB POCT HEMOGLOBIN AIC: 8 (ref ?–6.5)

## 2021-11-30 PROCEDURE — 90682 RIV4 VACC RECOMBINANT DNA IM: CPT | Performed by: NURSE PRACTITIONER

## 2021-11-30 PROCEDURE — 90471 IMMUNIZATION ADMIN: CPT | Performed by: NURSE PRACTITIONER

## 2021-11-30 PROCEDURE — 90732 PPSV23 VACC 2 YRS+ SUBQ/IM: CPT | Performed by: NURSE PRACTITIONER

## 2021-11-30 PROCEDURE — 90472 IMMUNIZATION ADMIN EACH ADD: CPT | Performed by: NURSE PRACTITIONER

## 2021-11-30 PROCEDURE — 83036 HEMOGLOBIN GLYCOSYLATED A1C: CPT | Performed by: NURSE PRACTITIONER

## 2021-11-30 PROCEDURE — 99214 OFFICE O/P EST MOD 30 MIN: CPT | Performed by: NURSE PRACTITIONER

## 2021-11-30 RX ORDER — NYSTATIN 100000 U/G
CREAM TOPICAL 2 TIMES DAILY
Qty: 30 G | Refills: 0 | Status: SHIPPED | OUTPATIENT
Start: 2021-11-30 | End: 2022-07-06 | Stop reason: SDUPTHER

## 2021-11-30 RX ORDER — FLUCONAZOLE 150 MG/1
TABLET ORAL
Qty: 3 TABLET | Refills: 0 | Status: SHIPPED | OUTPATIENT
Start: 2021-11-30 | End: 2021-12-02

## 2021-11-30 RX ORDER — NYSTATIN 100000 [USP'U]/G
POWDER TOPICAL 2 TIMES DAILY
Qty: 60 G | Refills: 3 | Status: SHIPPED | OUTPATIENT
Start: 2021-11-30 | End: 2022-07-06 | Stop reason: SDUPTHER

## 2021-11-30 RX ORDER — SEMAGLUTIDE 1.34 MG/ML
INJECTION, SOLUTION SUBCUTANEOUS
Qty: 1.5 ML | Refills: 3 | Status: SHIPPED | OUTPATIENT
Start: 2021-11-30 | End: 2022-01-03 | Stop reason: CLARIF

## 2021-12-08 ENCOUNTER — TELEPHONE (OUTPATIENT)
Dept: ADMINISTRATIVE | Facility: OTHER | Age: 61
End: 2021-12-08

## 2021-12-14 ENCOUNTER — TELEMEDICINE (OUTPATIENT)
Dept: INTERNAL MEDICINE CLINIC | Facility: CLINIC | Age: 61
End: 2021-12-14
Payer: COMMERCIAL

## 2021-12-14 DIAGNOSIS — R10.32 LLQ PAIN: Primary | ICD-10-CM

## 2021-12-14 DIAGNOSIS — R39.9 UTI SYMPTOMS: ICD-10-CM

## 2021-12-14 PROBLEM — Z12.11 SCREENING FOR COLON CANCER: Status: RESOLVED | Noted: 2021-11-30 | Resolved: 2021-12-14

## 2021-12-14 PROBLEM — B37.2 SKIN YEAST INFECTION: Status: RESOLVED | Noted: 2021-11-30 | Resolved: 2021-12-14

## 2021-12-14 PROCEDURE — 99213 OFFICE O/P EST LOW 20 MIN: CPT | Performed by: NURSE PRACTITIONER

## 2021-12-15 ENCOUNTER — APPOINTMENT (OUTPATIENT)
Dept: LAB | Facility: HOSPITAL | Age: 61
End: 2021-12-15
Payer: COMMERCIAL

## 2021-12-15 DIAGNOSIS — R31.9 HEMATURIA, UNSPECIFIED TYPE: ICD-10-CM

## 2021-12-15 DIAGNOSIS — R10.32 LLQ PAIN: Primary | ICD-10-CM

## 2021-12-15 DIAGNOSIS — R39.9 UTI SYMPTOMS: ICD-10-CM

## 2021-12-15 LAB
BACTERIA UR QL AUTO: NORMAL /HPF
BILIRUB UR QL STRIP: NEGATIVE
CLARITY UR: ABNORMAL
COLOR UR: YELLOW
GLUCOSE UR STRIP-MCNC: ABNORMAL MG/DL
HGB UR QL STRIP.AUTO: NEGATIVE
KETONES UR STRIP-MCNC: NEGATIVE MG/DL
LEUKOCYTE ESTERASE UR QL STRIP: NEGATIVE
NITRITE UR QL STRIP: NEGATIVE
NON-SQ EPI CELLS URNS QL MICRO: NORMAL /HPF
PH UR STRIP.AUTO: 6.5 [PH]
PROT UR STRIP-MCNC: ABNORMAL MG/DL
RBC #/AREA URNS AUTO: NORMAL /HPF
SP GR UR STRIP.AUTO: 1.01 (ref 1–1.03)
UROBILINOGEN UR QL STRIP.AUTO: 0.2 E.U./DL
WBC #/AREA URNS AUTO: NORMAL /HPF

## 2021-12-15 PROCEDURE — 87086 URINE CULTURE/COLONY COUNT: CPT

## 2021-12-15 PROCEDURE — 81001 URINALYSIS AUTO W/SCOPE: CPT

## 2021-12-17 LAB — BACTERIA UR CULT: NORMAL

## 2021-12-27 ENCOUNTER — HOSPITAL ENCOUNTER (OUTPATIENT)
Dept: CT IMAGING | Facility: HOSPITAL | Age: 61
Discharge: HOME | End: 2021-12-27
Payer: COMMERCIAL

## 2021-12-27 DIAGNOSIS — N85.8 UTERINE MASS: Primary | ICD-10-CM

## 2021-12-27 DIAGNOSIS — R31.9 HEMATURIA, UNSPECIFIED TYPE: ICD-10-CM

## 2021-12-27 DIAGNOSIS — R10.32 LLQ PAIN: ICD-10-CM

## 2021-12-27 PROCEDURE — 74177 CT ABD & PELVIS W/CONTRAST: CPT

## 2021-12-27 PROCEDURE — G1004 CDSM NDSC: HCPCS

## 2021-12-27 RX ADMIN — IOHEXOL 100 ML: 350 INJECTION, SOLUTION INTRAVENOUS at 11:29

## 2022-01-03 ENCOUNTER — OFFICE VISIT (OUTPATIENT)
Dept: OBGYN CLINIC | Facility: MEDICAL CENTER | Age: 62
End: 2022-01-03
Payer: COMMERCIAL

## 2022-01-03 VITALS
DIASTOLIC BLOOD PRESSURE: 78 MMHG | BODY MASS INDEX: 37.22 KG/M2 | WEIGHT: 218 LBS | SYSTOLIC BLOOD PRESSURE: 122 MMHG | HEIGHT: 64 IN

## 2022-01-03 DIAGNOSIS — E11.9 TYPE 2 DIABETES MELLITUS WITHOUT COMPLICATION, WITHOUT LONG-TERM CURRENT USE OF INSULIN (HCC): Primary | ICD-10-CM

## 2022-01-03 DIAGNOSIS — N85.8 UTERINE MASS: ICD-10-CM

## 2022-01-03 DIAGNOSIS — N95.0 POST-MENOPAUSE BLEEDING: ICD-10-CM

## 2022-01-03 DIAGNOSIS — Z12.4 SCREENING FOR CERVICAL CANCER: ICD-10-CM

## 2022-01-03 DIAGNOSIS — R19.00 PELVIC MASS: Primary | ICD-10-CM

## 2022-01-03 PROCEDURE — G0145 SCR C/V CYTO,THINLAYER,RESCR: HCPCS | Performed by: OBSTETRICS & GYNECOLOGY

## 2022-01-03 PROCEDURE — 99203 OFFICE O/P NEW LOW 30 MIN: CPT | Performed by: OBSTETRICS & GYNECOLOGY

## 2022-01-03 PROCEDURE — 88305 TISSUE EXAM BY PATHOLOGIST: CPT | Performed by: PATHOLOGY

## 2022-01-03 PROCEDURE — 58100 BIOPSY OF UTERUS LINING: CPT | Performed by: OBSTETRICS & GYNECOLOGY

## 2022-01-03 PROCEDURE — G0476 HPV COMBO ASSAY CA SCREEN: HCPCS | Performed by: OBSTETRICS & GYNECOLOGY

## 2022-01-03 RX ORDER — SITAGLIPTIN 100 MG/1
100 TABLET, FILM COATED ORAL DAILY
Qty: 30 TABLET | Refills: 3 | Status: SHIPPED | OUTPATIENT
Start: 2022-01-03 | End: 2022-04-28

## 2022-01-03 RX ORDER — SITAGLIPTIN 100 MG/1
TABLET, FILM COATED ORAL
COMMUNITY
Start: 2021-11-30 | End: 2022-01-03 | Stop reason: SDUPTHER

## 2022-01-03 NOTE — PROGRESS NOTES
Endometrial biopsy    Date/Time: 1/3/2022 12:48 PM  Performed by: Ole Vance MD  Authorized by: Ole Vance MD   Universal Protocol:  Consent: Verbal consent obtained  Written consent obtained  Risks and benefits: risks, benefits and alternatives were discussed  Consent given by: patient  Patient understanding: patient states understanding of the procedure being performed  Patient consent: the patient's understanding of the procedure matches consent given  Procedure consent: procedure consent does not match procedure scheduled (unscheduled)  Required items: required blood products, implants, devices, and special equipment available      Indication:     Indications: Post-menopausal bleeding      Chronicity of post-menopausal bleeding:  New    Progression of post-menopausal bleeding:  Resolved  Procedure:     Procedure: endometrial biopsy with Pipelle      A bivalve speculum was placed in the vagina: yes      Cervix cleaned and prepped: no (see below)      The cervix was dilated: no      Uterus sounded: yes      Uterus sound depth (cm):  8    Specimen collected: specimen collected and sent to pathology      Specimen collected comment:  Scant    Patient tolerated procedure well with no complications: yes    Findings:     Uterus size:  >14 weeks  Comments:     Procedure comments:  Cervix unable to be visualized with speculum  Speculum removed and bimanual exam performed  Cervix high and anterior in vagina, oriented acutely posterior secondary to rotation of uterus  Speculum was replaced and a second attempt was made to visualize  Partial visualization was achieved and attempt made to grasp cervix with tenaculum  Cervix could be grasped, but not repositioned easily with traction  Attempt abandoned due to risk of trauma  A final attempt was made over gloved hands, which was successful to a depth of 8cm  A scant sample was obtained and sent for pathology       Patient tolerated the procedure well with no immediate complications

## 2022-01-03 NOTE — PROGRESS NOTES
Assessment:  64 y o  [id number] postmenopausal woman presents with an abnormal pelvic CT  Imaging favors uterine mass as fibroid, however unclear if adnexal component is same or separate  Plan:  Diagnoses and all orders for this visit:    Pelvic mass  Post-menopause bleeding  -     EMB/Tissue Exam  -     MRI pelvis female wo and w contrast; Future  -     ; Future  -     Endometrial biopsy  -     Liquid-based pap, screening  - Recommend surgical excision given size/mass effect  Patient agrees  Discussed exam and strong possibility that this may occur via ROMÁN  Recommend further evaluation to reasonably exclude malignancy and for surgical planning        __________________________________________________________________    Subjective   Jeanie Carter is a 64 y o  [id number] postmenopausal woman presents with an abnormal pelvic CT  Reports she was initially seen by her PCP after developing a stabbing pain on her left lower abdomen/side  Symptoms came on suddenly  She went to the restroom and noticed she was bleeding  Bled for 24hr and then resolved  Not heavy  Symptoms felt similar to a ruptured cyst she had before  As the days passed, her pain became less buut is still occurring with certain movements/positions  Has never radiated  GYN hx largely unremarkable  Reports no known pathology and she has been in menopause for 6-7yrs without bleeding  Last 2yrs of menstrual life had AUB (q2wk menses, then gone 10mo before restarting), but no hx of heavy or irregular menses before  Last pap before 2005, no hx abnormals  No hx STDs, [id number] (2x SVDs)  Reviewed report with the patient  She appears to have a large fibroid, which we discussed in detail  She also has some concern by left adnexa  DDx includes calcified fibroid vs primary ovarian pathology (complex cyst vs neoplasm)   I discussed the differences in surgery pending suspicion for malignant process, in which case I would recommend completion by an oncologist  Discussed high likelihood based on exam for requiring an abdominal approach  Discussed hysterectomy in detail, recovery, and expectations  Patient is agreeable to this plan  The following portions of the patient's history were reviewed and updated as appropriate: allergies, current medications, past family history, past medical history, past social history, past surgical history and problem list     Review of Systems  Review of Systems   Constitutional: Negative for chills, fatigue and fever  Respiratory: Negative for cough and shortness of breath  Cardiovascular: Negative for palpitations  Gastrointestinal: Negative for abdominal pain and diarrhea  Genitourinary: Positive for frequency, menstrual problem, pelvic pain, urgency and vaginal bleeding (resolved)  Negative for dysuria, flank pain, genital sores, hematuria, vaginal discharge and vaginal pain  Objective  /78   Ht 5' 4" (1 626 m)   Wt 98 9 kg (218 lb)   BMI 37 42 kg/m²      Physical Exam:  Physical Exam  Exam conducted with a chaperone present  Constitutional:       General: She is not in acute distress  Appearance: Normal appearance  She is not ill-appearing, toxic-appearing or diaphoretic  Eyes:      General: No scleral icterus  Right eye: No discharge  Left eye: No discharge  Conjunctiva/sclera: Conjunctivae normal    Cardiovascular:      Rate and Rhythm: Normal rate  Pulmonary:      Effort: Pulmonary effort is normal  No respiratory distress  Abdominal:      General: There is distension  Palpations: There is mass  Tenderness: There is no abdominal tenderness  There is no guarding or rebound  Hernia: No hernia is present  Genitourinary:     General: Normal vulva  Exam position: Lithotomy position  Labia:         Right: No rash, tenderness or lesion (benign appearing labial hypertrophy)  Left: No rash, tenderness or lesion         Urethra: No prolapse, urethral swelling or urethral lesion  Vagina: No signs of injury  No vaginal discharge, erythema, tenderness or bleeding  Cervix: No cervical motion tenderness  Uterus: Deviated, enlarged (20wk size) and fixed  Not tender and no uterine prolapse  Adnexa:         Right: No mass (unable to discretely examine secondary to grossly enlarged uterus  no separate palpable mass)  Left: No mass  Comments: Cervix is unable to be visualized secondary to distortion from pelvic mass  Upper vagina tilted/deviated with mass  Cervix is slightly nodular but normal in size/shape, psb secondary to nabothian cysts  A pap and EMB were accomplished with tactile guidance, but may be suboptimal    Musculoskeletal:         General: No swelling  Skin:     General: Skin is warm and dry  Coloration: Skin is not jaundiced or pale  Findings: No bruising or erythema  Neurological:      Mental Status: She is alert  Psychiatric:         Mood and Affect: Mood normal          Behavior: Behavior normal          Thought Content: Thought content normal          Judgment: Judgment normal            Reviewed:  CT A&P (12/27/21)  "There is a large pelvic mass which extends above the level of the umbilicus, essentially centered in the pelvic midline, perhaps slightly eccentric toward the right, compressing the right ureter, as well as compressing the urinary bladder and displacing adjacent bowel loops and measuring approximately 17 6 x 13 5 x 12 7 cm  The mass may be arising from the posterior wall of the uterus otherwise displacing the uterus anteriorly  There are small foci of fat density within the mass but also areas of hyperdensity consistent with vascularity or subtle calcification    The left adnexa is abnormal, with curvilinear area of enhancement possibly vessels or corpus luteum and surrounding small amount of ill-defined hypodensity perhaps loculated fluid "    An independent image review was also completed with the patient by this provider

## 2022-01-05 ENCOUNTER — TELEPHONE (OUTPATIENT)
Dept: OBGYN CLINIC | Facility: MEDICAL CENTER | Age: 62
End: 2022-01-05

## 2022-01-05 LAB
HPV HR 12 DNA CVX QL NAA+PROBE: NEGATIVE
HPV16 DNA CVX QL NAA+PROBE: NEGATIVE
HPV18 DNA CVX QL NAA+PROBE: NEGATIVE

## 2022-01-05 NOTE — TELEPHONE ENCOUNTER
----- Message from Joel Murrieta RN sent at 1/4/2022  7:51 AM EST -----  Regarding: FW: prior auth MRI    ----- Message -----  From: Sachin Epperson MD  Sent: 1/3/2022  12:44 PM EST  To: Lisa Dumont MA, Joel Murrieta RN  Subject: prior auth MRI                                   I ordered an MRI for  Mix to help better characterize a pelvic mass  Can you check if it requires prior auth?

## 2022-01-06 NOTE — TELEPHONE ENCOUNTER
MRI approved auth#[id number]  Attempted to call pt lala for her to schedule MRI at [facility]

## 2022-01-10 LAB
LAB AP GYN PRIMARY INTERPRETATION: NORMAL
Lab: NORMAL

## 2022-01-19 ENCOUNTER — APPOINTMENT (OUTPATIENT)
Dept: LAB | Facility: HOSPITAL | Age: 62
End: 2022-01-19
Attending: OBSTETRICS & GYNECOLOGY
Payer: COMMERCIAL

## 2022-01-19 ENCOUNTER — HOSPITAL ENCOUNTER (OUTPATIENT)
Dept: MRI IMAGING | Facility: HOSPITAL | Age: 62
Discharge: HOME | End: 2022-01-19
Attending: OBSTETRICS & GYNECOLOGY
Payer: COMMERCIAL

## 2022-01-19 DIAGNOSIS — R19.00 PELVIC MASS: ICD-10-CM

## 2022-01-19 PROCEDURE — 86304 IMMUNOASSAY TUMOR CA 125: CPT

## 2022-01-19 PROCEDURE — 36415 COLL VENOUS BLD VENIPUNCTURE: CPT

## 2022-01-19 PROCEDURE — A9585 GADOBUTROL INJECTION: HCPCS | Performed by: OBSTETRICS & GYNECOLOGY

## 2022-01-19 PROCEDURE — 72197 MRI PELVIS W/O & W/DYE: CPT

## 2022-01-19 PROCEDURE — G1004 CDSM NDSC: HCPCS

## 2022-01-19 RX ADMIN — GADOBUTROL 9 ML: 604.72 INJECTION INTRAVENOUS at 15:40

## 2022-01-20 LAB — CANCER AG125 SERPL-ACNC: 6.1 U/ML (ref 0–30)

## 2022-01-24 ENCOUNTER — PREP FOR PROCEDURE (OUTPATIENT)
Dept: OBGYN CLINIC | Facility: MEDICAL CENTER | Age: 62
End: 2022-01-24

## 2022-01-24 ENCOUNTER — TELEPHONE (OUTPATIENT)
Dept: OBGYN CLINIC | Facility: MEDICAL CENTER | Age: 62
End: 2022-01-24

## 2022-01-24 DIAGNOSIS — Z01.818 PRE-OP TESTING: Primary | ICD-10-CM

## 2022-01-24 DIAGNOSIS — N95.0 POSTMENOPAUSAL BLEEDING: Primary | ICD-10-CM

## 2022-01-24 DIAGNOSIS — R19.00 PELVIC MASS: ICD-10-CM

## 2022-01-24 DIAGNOSIS — E11.9 TYPE 2 DIABETES MELLITUS WITHOUT COMPLICATION, WITHOUT LONG-TERM CURRENT USE OF INSULIN (HCC): ICD-10-CM

## 2022-01-24 RX ORDER — LANCETS 33 GAUGE
EACH MISCELLANEOUS
Qty: 100 EACH | Refills: 1 | Status: SHIPPED | OUTPATIENT
Start: 2022-01-24 | End: 2022-07-15

## 2022-01-24 NOTE — TELEPHONE ENCOUNTER
LMOV for pt to call the office  Surgery date 2/28/2022  Needs to schedule H&P and post-op  with Dr Hang Akbar  and Preop with her PCP

## 2022-01-25 RX ORDER — CEFAZOLIN SODIUM 2 G/50ML
2000 SOLUTION INTRAVENOUS ONCE
Status: CANCELLED | OUTPATIENT
Start: 2022-01-25 | End: 2022-01-25

## 2022-01-26 ENCOUNTER — TELEPHONE (OUTPATIENT)
Dept: INTERNAL MEDICINE CLINIC | Facility: CLINIC | Age: 62
End: 2022-01-26

## 2022-01-26 NOTE — TELEPHONE ENCOUNTER
Pt called for pre-op clearance and spoke to [facility]  I reviewed with Dr Kenzie Archer, who stated she will need cardiac clearance secondary to her past history    I called Bal Hartman and informed her and she will return call after she talks to them

## 2022-01-26 NOTE — PROGRESS NOTES
Cardiology Follow Up    Adrian Gatica  1960  [id number]  [facility] [place] [address] [place]  [place] [address]  [phone]    1  Pre-operative cardiovascular examination     2  Coronary artery disease involving native coronary artery of native heart without angina pectoris     3  Presence of drug-eluting stent in right coronary artery     4  Benign essential hypertension     5  Dyslipidemia     6  Tobacco abuse         Discussion/Summary:  She presents today for cardiac clearance prior to total abdominal hysterectomy  She has no cardiopulmonary complaints  She has a good functional capacity of at least 4-7 METS  She has no symptoms of angina or decompensated heart failure  She does have a chronic left bundle branch block  Due to this and prior stenting she was asked to undergo a repeat echocardiogram in the past but this was not scheduled  I have asked her to have this done prior to her surgery  Therefore, cardiac clearance is pending this result  She should remain on aspirin, statin, and beta-blocker therapy  Her blood pressure was initially elevated but did come down upon my recheck  No changes will be made at this time  Her lipids are at goal on current statin therapy  Smoking cessation was encouraged  She has signs/symptoms of obstructive sleep apnea but states she would not be able to tolerate a CPAP  Thus a sleep study has been deferred  She will RTO in July for her 1 year follow up with Dr Jefe Daniels or sooner if necessary  She will call with any concerns  Interval History:   Adrian Gatica is a 64 y o  female with coronary artery disease s/p AMERICA to the mid RCA in 2013, hypertension, dyslipidemia, diabetes, tobacco abuse who presents to the office today for cardiac clearance       She was recently found to have a pelvic mass thought to be most likely a large fibroid although there was some concern by her left adnexa suspicious for a calcified fibroid vs  Ovarian pathology  Patient was recommended to have surgical excision via total abdominal hysterectomy  Since her last office visit she has been feeling well  She denies any cardiopulmonary complaints  She is able to go up and down 16 steps multiple times per day without any shortness of breath or chest pain  She denies lower extremity edema, orthopnea, and PND  She denies lightheadedness, dizziness, palpitations, and syncope  She has been compliant with her medications       She continues to smoke about one half pack of cigarettes daily      Medical Problems             Problem List     Anxiety    Atherosclerotic heart disease of native coronary artery without angina pectoris    Overview Signed 3/19/2018 12:47 PM by ANA Solano     Description: s/p AMERICA ([facility]) mid RCA 05/28/2013         Dyslipidemia    Benign essential hypertension    Obesity    Type 2 diabetes mellitus without complication, without long-term current use of insulin ([facility])      Lab Results   Component Value Date    HGBA1C 8 (A) 11/30/2021         Leukocytosis    Presence of drug coated stent in right coronary artery    Left bundle branch block (LBBB)    Tobacco abuse    Routine adult health maintenance    Encounter for screening mammogram for malignant neoplasm of breast    Acute bilateral low back pain with bilateral sciatica    BMI 38 0-38 9,adult    LLQ pain              Past Medical History:   Diagnosis Date    Cardiac disease     MI May 25, 2013    Chronic neck pain     Last Assessed:11/28/16    Diabetes mellitus ([facility] [address] )     Hyperlipidemia     Hypertension     Left bundle branch block     Last Assessed:12/5/13    Mild depression ([address] )     Last Assessed:11/28/16    Tonsillar hypertrophy     Last Assessed:8/27/13     Social History     Socioeconomic History    Marital status:      Spouse name: Not on file    Number of children: 2    Years of education: Not on file    Highest education level: Not on file   Occupational History    Occupation: admin      Comment: [employer]   Tobacco Use    Smoking status: Current Every Day Smoker     Packs/day: 0 50     Years: 20 00     Pack years: 10 00     Types: Cigarettes    Smokeless tobacco: Never Used   Vaping Use    Vaping Use: Every day   Substance and Sexual Activity    Alcohol use: No    Drug use: No     Comment: occassionally     Sexual activity: Not Currently   Other Topics Concern    Not on file   Social History Narrative    Not on file     Social Determinants of Health     Financial Resource Strain: Not on file   Food Insecurity: Not on file   Transportation Needs: Not on file   Physical Activity: Not on file   Stress: Not on file   Social Connections: Not on file   Intimate Partner Violence: Not on file   Housing Stability: Not on file      Family History   Problem Relation Age of Onset    Arthritis Mother     Colon cancer Mother 66    Coronary artery disease Mother     Fibrocystic breast disease Mother     Hypertension Mother     Cardiomyopathy Mother         Ischemic Dilated Cardiomyopathy    Arthritis Father     Kidney disease Father         Chronic (NKF classification)    Coronary artery disease Father     Gout Father     Prostate cancer Father     Hyperlipidemia Father         Pure Hypercholesterolemia    Stroke Father         Stroke Syndrome    Diabetes type II Father     Hypertension Sister     Hypothyroidism Sister     Other Sister         IVP- Solitary Kidney Calculus    Rheum arthritis Sister     Alcohol abuse Brother     Bipolar disorder Brother         NOS    Other Brother         IVP- Solitary Kidney Calculus    Washington's disease Brother     Cerebral aneurysm Brother         Ruptured    Breast cancer Maternal Aunt     Peripheral vascular disease Maternal Aunt     Breast cancer Cousin         2 maternal cousins    Lung cancer Maternal Uncle     Diabetes type II Maternal Uncle     Thrombocytopenia Daughter         Idiopathic Thrombocytopenia [place] at 10 yo     Past Surgical History:   Procedure Laterality Date    CARDIAC SURGERY      1 stent placed    CORONARY ANGIOPLASTY WITH STENT PLACEMENT  05/28/2013    per [employer]: Cath stent 1 type drug-eluting, mild RCA       Current Outpatient Medications:     aspirin 325 mg tablet, Take 325 mg by mouth daily, Disp: , Rfl:     carvedilol (COREG) 12 5 mg tablet, take 1 tablet by mouth twice a day with meals, Disp: 180 tablet, Rfl: 3    cetirizine (ZyrTEC) 10 mg tablet, take 1 tablet by mouth once daily, Disp: 30 tablet, Rfl: 3    Cholecalciferol (VITAMIN D3 PO), Take 2,000 Units by mouth 2 (two) times a day, Disp: , Rfl:     cyanocobalamin 2000 MCG tablet, Take 2,000 mcg by mouth daily, Disp: , Rfl:     fluticasone (FLONASE) 50 mcg/act nasal spray, 1 spray into each nostril daily, Disp: 15 8 mL, Rfl: 5    glipiZIDE (GLUCOTROL) 5 mg tablet, take 1 tablet by mouth once daily, Disp: 30 tablet, Rfl: 3    Glucose Blood (ONETOUCH TEST VI), by In Vitro route, Disp: , Rfl:     glucose blood (OneTouch Verio) test strip, Test 3 times daily, Disp: 100 each, Rfl: 5    Januvia 100 MG tablet, Take 1 tablet (100 mg total) by mouth daily, Disp: 30 tablet, Rfl: 3    Lancets (OneTouch Delica Plus [id number]) MISC, use 1 LANCET to TEST BLOOD SUGAR three times a day, Disp: 100 each, Rfl: 1    losartan (COZAAR) 100 MG tablet, take 1 tablet by mouth once daily, Disp: 90 tablet, Rfl: 3    metFORMIN (GLUCOPHAGE) 500 mg tablet, take 2 tablets by mouth every morning and 2 tablets by mouth every evening, Disp: 120 tablet, Rfl: 5    Multiple Vitamin-Folic Acid TABS, Take by mouth, Disp: , Rfl:     nicotine (NICODERM CQ) 7 mg/24hr TD 24 hr patch, Place 1 patch on the skin every 24 hours, Disp: 28 patch, Rfl: 3    nitroglycerin (NITROSTAT) 0 4 mg SL tablet, Place 1 tablet (0 4 mg total) under the tongue every 5 (five) minutes as needed for chest pain, Disp: 30 tablet, Rfl: 3    nystatin (MYCOSTATIN) cream, Apply topically 2 (two) times a day, Disp: 30 g, Rfl: 0    nystatin (MYCOSTATIN) powder, Apply topically 2 (two) times a day, Disp: 60 g, Rfl: 3    rosuvastatin (CRESTOR) 40 MG tablet, take 1 tablet by mouth once daily, Disp: 90 tablet, Rfl: 3    nicotine (NICODERM CQ) 14 mg/24hr TD 24 hr patch, Place 1 patch on the skin every 24 hours (Patient not taking: Reported on 1/27/2022 ), Disp: 28 patch, Rfl: 0  Allergies   Allergen Reactions    Hctz [Hydrochlorothiazide] Shortness Of Breath and Dizziness    Wellbutrin [Bupropion] Shortness Of Breath     Category: Adverse Reaction;     Ace Inhibitors Cough       Labs:     Chemistry        Component Value Date/Time    K 4 1 01/27/2022 1216     01/27/2022 1216    CO2 26 01/27/2022 1216    BUN 14 01/27/2022 1216    CREATININE 1 07 01/27/2022 1216        Component Value Date/Time    CALCIUM 8 8 01/27/2022 1216    ALKPHOS 95 01/27/2022 1216    AST 16 01/27/2022 1216    ALT 30 01/27/2022 1216            No results found for: CHOL  Lab Results   Component Value Date    HDL 43 (L) 01/27/2022    HDL 43 (L) 11/22/2021    HDL 39 (L) 10/30/2019     Lab Results   Component Value Date    LDLCALC 26 01/27/2022    LDLCALC 28 11/22/2021    LDLCALC 34 10/30/2019     Lab Results   Component Value Date    TRIG 143 01/27/2022    TRIG 150 11/22/2021    TRIG 113 10/30/2019     No results found for: CHOLHDL    Imaging: MRI pelvis female wo and w contrast    Result Date: 1/19/2022  Narrative: MRI OF THE PELVIS WITH AND WITHOUT CONTRAST (GYNECOLOGIC) INDICATION:  Further characterization of pelvic mass  COMPARISON: CT of the abdomen/pelvis dated 12/27/2021  TECHNIQUE: The following pulse sequences were obtained:  Axial T1, axial and sagittal T2, coronal T2 with fat saturation, pre-contrast axial T1 with fat saturation, post-contrast axial and sagittal T1 with fat saturation   IV Contrast:  9 mL of Gadobutrol injection (SINGLE-DOSE) Image quality: Large amount of patient motion artifact on axial and sagittal images obscures fine detail  Precontrast-postcontrast subtraction images are degraded by misregistration, living reliability for evaluation of enhancement of left ovarian lesion/collection  FINDINGS: UTERUS: Uterine origin the midline pelvic mass is confirmed by presence of ovarian parenchyma wrapping around the margins of the lesion (claw sign)  Total uterine size of 17 7 cm x 15 9 cm x 13 6 cm including the fibroid  Junctional zone:  Normal thickness of 4 mm  No subendometrial cysts  Myometrium:  Single dominant fibroid occupying the near-complete posterior aspect of the uterus from the cervix to the fundus and extending cranially beyond the fundus  Though the largest component is subserosal, the fibroid extends through the entire myometrium and has subendometrial component at the lower uterine segment  The fibroid measures 17 7 by a 13 5 cm x 13 6 cm and anteriorly displaces and distorts the more normal-appearing uterine parenchyma  The fibroid is predominantly markedly T2 hypointensity with intervening T2 hyperintense septae and contains small foci of macroscopic fat superiorly  The fibroid is diffusely hypoenhancing relative to myometrium  Endometrium: Atrophic postmenopausal appearance measuring 4 mm in thickness  No heterogeneity or mass  ADNEXA:  Right: Normal size measuring 2 cm x 3 1 cm x 1 2 cm  Normal featureless postmenopausal appearance of the parenchyma  No adnexal mass identified  No evidence of hydrosalpinx  Left: Complex fluid collection arising from the anterior aspect of the left ovary  Ovarian parenchyma measures 2 8 cm x 4 2 cm x 2 5 cm not including the collection  Ovarian parenchyma shows enhancement  When measured separately, collection measures 5 0 cm x 2 5 cm x 5 0 cm x 3 8 cm  This is significantly decreased from prior where it measured 4 8 cm x 6 4 cm x 10 3 cm  The collection shows a thick, irregular intrinsically T1 hyperintense rim suggesting blood products  Increasing signal with increasing be value suggests restricted diffusion  Motion and misregistration artifact degrades reliability of postcontrast subtraction imaging to assess for enhancement  BLADDER: Decompressed  Distorted by mass effect from the fibroid  PELVIC CAVITY:  Left adnexal collection discussed above  No pelvic sidewall lymphadenopathy  BOWEL:  Anterior rectocele  No inflamed or dilated loops of bowel demonstrated  Normal appendix  OSSEOUS STRUCTURES:   No osseous destruction  VASCULAR STRUCTURES:  No proximal ovarian venous thrombosis demonstrated  There is tubular T1 hyperintense foci superior to the ovary which probably represents free blood products, though far distal ovarian venous thrombosis would be difficult to exclude  There is mild distortion of the right iliac vessels by the patient's fibroid without high-grade compression  PELVIC WALL:  Anterior rectocele  No cystocele  No inguinal hernias or lymphadenopathy  Impression: 1  Decreasing left adnexal hemorrhagic collection arising from the left ovary  Primary differential considerations include spontaneous hemorrhage (if patient is anticoagulated), distal ovarian vein thrombosis (no proximal thrombosis demonstrated), or hemorrhagic ovarian cyst or mass  Presence of persistent parenchymal enhancement and lack of acute pain at the time of presentation speaks against hemorrhagic torsion  2   No obvious underlying left ovarian mass on the current exam, though postcontrast evaluation is degraded by patient motion and misregistration artifact  If operative management is not warranted clinically at this time, then recommend imaging surveillance with repeat MRI in 2 months to allow for further involution of the hematoma and better characterization of the ovary   3   Massive fibroid arising from the posterior uterus with predominant hyaline pattern of degeneration  The lesion is hypovascular relative to normal myometrium  4   Anterior rectocele  I personally discussed this study with Kristin Kailash on 1/19/2022 at 4:50 PM  Workstation performed: [id number]       ECG: normal sinus rhythm  LBBBB      Review of Systems   Constitutional: Negative for chills and fever  HENT: Negative  Cardiovascular: Negative for chest pain, dyspnea on exertion, leg swelling, orthopnea, palpitations, paroxysmal nocturnal dyspnea and syncope  Respiratory: Negative for cough and shortness of breath  Gastrointestinal: Negative for diarrhea, nausea and vomiting  Genitourinary: Negative  Neurological: Negative for dizziness and light-headedness  All other systems reviewed and are negative  Vitals:    01/27/22 1438   BP: 130/70     Vitals:    01/27/22 1413   Weight: 96 9 kg (213 lb 9 6 oz)     Height: 5' 4" (162 6 cm)   Body mass index is 36 66 kg/m²  Physical Exam:  Physical Exam  Vitals reviewed  Constitutional:       General: She is not in acute distress  Appearance: She is obese  She is not diaphoretic  HENT:      Head: Normocephalic and atraumatic  Eyes:      Pupils: Pupils are equal, round, and reactive to light  Neck:      Vascular: No carotid bruit  Cardiovascular:      Rate and Rhythm: Normal rate and regular rhythm  Pulses:           Radial pulses are 2+ on the right side and 2+ on the left side  Dorsalis pedis pulses are 2+ on the right side and 2+ on the left side  Heart sounds: S1 normal and S2 normal  No murmur heard  Pulmonary:      Effort: Pulmonary effort is normal  No respiratory distress  Breath sounds: Normal breath sounds  No wheezing or rales  Abdominal:      General: There is no distension  Palpations: Abdomen is soft  Tenderness: There is no abdominal tenderness  Musculoskeletal:         General: No deformity  Normal range of motion        Cervical back: Normal range of motion  Right lower leg: No edema  Left lower leg: No edema  Skin:     General: Skin is warm and dry  Findings: No erythema  Neurological:      General: No focal deficit present  Mental Status: She is alert and oriented to person, place, and time     Psychiatric:         Mood and Affect: Mood normal          Behavior: Behavior normal

## 2022-01-27 ENCOUNTER — APPOINTMENT (OUTPATIENT)
Dept: LAB | Facility: HOSPITAL | Age: 62
End: 2022-01-27
Payer: COMMERCIAL

## 2022-01-27 ENCOUNTER — OFFICE VISIT (OUTPATIENT)
Dept: CARDIOLOGY CLINIC | Facility: HOSPITAL | Age: 62
End: 2022-01-27
Payer: COMMERCIAL

## 2022-01-27 VITALS
WEIGHT: 213.6 LBS | BODY MASS INDEX: 36.47 KG/M2 | SYSTOLIC BLOOD PRESSURE: 130 MMHG | DIASTOLIC BLOOD PRESSURE: 70 MMHG | HEIGHT: 64 IN

## 2022-01-27 DIAGNOSIS — Z72.0 TOBACCO ABUSE: ICD-10-CM

## 2022-01-27 DIAGNOSIS — I10 BENIGN ESSENTIAL HYPERTENSION: ICD-10-CM

## 2022-01-27 DIAGNOSIS — Z12.11 SCREENING FOR COLON CANCER: ICD-10-CM

## 2022-01-27 DIAGNOSIS — E78.5 DYSLIPIDEMIA: ICD-10-CM

## 2022-01-27 DIAGNOSIS — B37.2 SKIN YEAST INFECTION: ICD-10-CM

## 2022-01-27 DIAGNOSIS — I25.10 ATHEROSCLEROSIS OF NATIVE CORONARY ARTERY OF NATIVE HEART WITHOUT ANGINA PECTORIS: ICD-10-CM

## 2022-01-27 DIAGNOSIS — E11.9 TYPE 2 DIABETES MELLITUS WITHOUT COMPLICATION, WITHOUT LONG-TERM CURRENT USE OF INSULIN (HCC): ICD-10-CM

## 2022-01-27 DIAGNOSIS — I25.10 CORONARY ARTERY DISEASE INVOLVING NATIVE CORONARY ARTERY OF NATIVE HEART WITHOUT ANGINA PECTORIS: ICD-10-CM

## 2022-01-27 DIAGNOSIS — R21 RASH: ICD-10-CM

## 2022-01-27 DIAGNOSIS — Z01.810 PRE-OPERATIVE CARDIOVASCULAR EXAMINATION: Primary | ICD-10-CM

## 2022-01-27 DIAGNOSIS — Z95.5 PRESENCE OF DRUG-ELUTING STENT IN RIGHT CORONARY ARTERY: ICD-10-CM

## 2022-01-27 LAB
ALBUMIN SERPL BCP-MCNC: 3.8 G/DL (ref 3.5–5)
ALP SERPL-CCNC: 95 U/L (ref 46–116)
ALT SERPL W P-5'-P-CCNC: 30 U/L (ref 12–78)
ANION GAP SERPL CALCULATED.3IONS-SCNC: 10 MMOL/L (ref 4–13)
AST SERPL W P-5'-P-CCNC: 16 U/L (ref 5–45)
BASOPHILS # BLD AUTO: 0.08 THOUSANDS/ΜL (ref 0–0.1)
BASOPHILS NFR BLD AUTO: 1 % (ref 0–1)
BILIRUB SERPL-MCNC: 0.31 MG/DL (ref 0.2–1)
BUN SERPL-MCNC: 14 MG/DL (ref 5–25)
CALCIUM SERPL-MCNC: 8.8 MG/DL (ref 8.3–10.1)
CHLORIDE SERPL-SCNC: 103 MMOL/L (ref 100–108)
CHOLEST SERPL-MCNC: 98 MG/DL
CO2 SERPL-SCNC: 26 MMOL/L (ref 21–32)
CREAT SERPL-MCNC: 1.07 MG/DL (ref 0.6–1.3)
EOSINOPHIL # BLD AUTO: 0.31 THOUSAND/ΜL (ref 0–0.61)
EOSINOPHIL NFR BLD AUTO: 2 % (ref 0–6)
ERYTHROCYTE [DISTWIDTH] IN BLOOD BY AUTOMATED COUNT: 15.9 % (ref 11.6–15.1)
GFR SERPL CREATININE-BSD FRML MDRD: 56 ML/MIN/1.73SQ M
GLUCOSE P FAST SERPL-MCNC: 182 MG/DL (ref 65–99)
HCT VFR BLD AUTO: 44.3 % (ref 34.8–46.1)
HDLC SERPL-MCNC: 43 MG/DL
HGB BLD-MCNC: 13.9 G/DL (ref 11.5–15.4)
IMM GRANULOCYTES # BLD AUTO: 0.04 THOUSAND/UL (ref 0–0.2)
IMM GRANULOCYTES NFR BLD AUTO: 0 % (ref 0–2)
LDLC SERPL CALC-MCNC: 26 MG/DL (ref 0–100)
LYMPHOCYTES # BLD AUTO: 3.54 THOUSANDS/ΜL (ref 0.6–4.47)
LYMPHOCYTES NFR BLD AUTO: 27 % (ref 14–44)
MCH RBC QN AUTO: 27.4 PG (ref 26.8–34.3)
MCHC RBC AUTO-ENTMCNC: 31.4 G/DL (ref 31.4–37.4)
MCV RBC AUTO: 87 FL (ref 82–98)
MONOCYTES # BLD AUTO: 0.9 THOUSAND/ΜL (ref 0.17–1.22)
MONOCYTES NFR BLD AUTO: 7 % (ref 4–12)
NEUTROPHILS # BLD AUTO: 8.26 THOUSANDS/ΜL (ref 1.85–7.62)
NEUTS SEG NFR BLD AUTO: 63 % (ref 43–75)
NONHDLC SERPL-MCNC: 55 MG/DL
NRBC BLD AUTO-RTO: 0 /100 WBCS
PLATELET # BLD AUTO: 269 THOUSANDS/UL (ref 149–390)
PMV BLD AUTO: 9.6 FL (ref 8.9–12.7)
POTASSIUM SERPL-SCNC: 4.1 MMOL/L (ref 3.5–5.3)
PROT SERPL-MCNC: 7.6 G/DL (ref 6.4–8.2)
RBC # BLD AUTO: 5.08 MILLION/UL (ref 3.81–5.12)
SODIUM SERPL-SCNC: 139 MMOL/L (ref 136–145)
TRIGL SERPL-MCNC: 143 MG/DL
TSH SERPL DL<=0.05 MIU/L-ACNC: 2.52 UIU/ML (ref 0.36–3.74)
WBC # BLD AUTO: 13.13 THOUSAND/UL (ref 4.31–10.16)

## 2022-01-27 PROCEDURE — 93000 ELECTROCARDIOGRAM COMPLETE: CPT | Performed by: INTERNAL MEDICINE

## 2022-01-27 PROCEDURE — 99214 OFFICE O/P EST MOD 30 MIN: CPT | Performed by: INTERNAL MEDICINE

## 2022-01-27 PROCEDURE — 80053 COMPREHEN METABOLIC PANEL: CPT

## 2022-01-27 PROCEDURE — 85025 COMPLETE CBC W/AUTO DIFF WBC: CPT

## 2022-01-27 PROCEDURE — 3075F SYST BP GE 130 - 139MM HG: CPT | Performed by: INTERNAL MEDICINE

## 2022-01-27 PROCEDURE — 84443 ASSAY THYROID STIM HORMONE: CPT

## 2022-01-27 PROCEDURE — 83036 HEMOGLOBIN GLYCOSYLATED A1C: CPT

## 2022-01-27 PROCEDURE — 80061 LIPID PANEL: CPT

## 2022-01-27 PROCEDURE — 36415 COLL VENOUS BLD VENIPUNCTURE: CPT

## 2022-01-27 PROCEDURE — 3078F DIAST BP <80 MM HG: CPT | Performed by: INTERNAL MEDICINE

## 2022-01-27 RX ORDER — CEFAZOLIN SODIUM 2 G/50ML
2000 SOLUTION INTRAVENOUS ONCE
Status: CANCELLED | OUTPATIENT
Start: 2022-02-09 | End: 2022-01-27

## 2022-01-28 LAB
EST. AVERAGE GLUCOSE BLD GHB EST-MCNC: 160 MG/DL
HBA1C MFR BLD: 7.2 %

## 2022-01-28 PROCEDURE — 3051F HG A1C>EQUAL 7.0%<8.0%: CPT | Performed by: INTERNAL MEDICINE

## 2022-02-01 PROBLEM — Z00.00 ROUTINE ADULT HEALTH MAINTENANCE: Status: RESOLVED | Noted: 2021-05-26 | Resolved: 2022-02-01

## 2022-02-01 PROBLEM — Z12.31 ENCOUNTER FOR SCREENING MAMMOGRAM FOR MALIGNANT NEOPLASM OF BREAST: Status: RESOLVED | Noted: 2021-05-26 | Resolved: 2022-02-01

## 2022-02-01 PROBLEM — D25.9 FIBROID UTERUS: Status: ACTIVE | Noted: 2022-02-01

## 2022-02-01 PROBLEM — Z01.818 PRE-OP EVALUATION: Status: ACTIVE | Noted: 2022-02-01

## 2022-02-01 PROBLEM — N94.89 ADNEXAL MASS: Status: ACTIVE | Noted: 2022-02-01

## 2022-02-02 ENCOUNTER — HOSPITAL ENCOUNTER (OUTPATIENT)
Dept: NON INVASIVE DIAGNOSTICS | Facility: HOSPITAL | Age: 62
Discharge: HOME | End: 2022-02-02
Attending: INTERNAL MEDICINE
Payer: COMMERCIAL

## 2022-02-02 VITALS
HEART RATE: 80 BPM | SYSTOLIC BLOOD PRESSURE: 162 MMHG | BODY MASS INDEX: 36.37 KG/M2 | DIASTOLIC BLOOD PRESSURE: 74 MMHG | HEIGHT: 64 IN | WEIGHT: 213 LBS

## 2022-02-02 DIAGNOSIS — I44.7 LEFT BUNDLE BRANCH BLOCK (LBBB): ICD-10-CM

## 2022-02-02 LAB
AORTIC ROOT: 3.4 CM
E WAVE DECELERATION TIME: 137 MS
E/A RATIO: 0.65
FRACTIONAL SHORTENING: 32 (ref 28–44)
INTERVENTRICULAR SEPTUM IN DIASTOLE (PARASTERNAL SHORT AXIS VIEW): 1.5 CM (ref 0.55–1.02)
LEFT ATRIUM AREA SYSTOLE SINGLE PLANE A4C: 16.3 CM2
LEFT ATRIUM SIZE: 3.7 CM
LEFT INTERNAL DIMENSION IN SYSTOLE: 2.6 CM (ref 2.1–4)
LEFT VENTRICULAR INTERNAL DIMENSION IN DIASTOLE: 3.8 CM (ref 5.56–8.29)
LEFT VENTRICULAR POSTERIOR WALL IN END DIASTOLE: 1.3 CM (ref 0.53–1.01)
LEFT VENTRICULAR STROKE VOLUME: 38 ML
MV E'TISSUE VEL-SEP: 7 CM/S
MV PEAK A VEL: 1.06 M/S
MV PEAK E VEL: 69 CM/S
RIGHT ATRIUM AREA SYSTOLE A4C: 12.8 CM2
SL CV LV EF: 65
SL CV PED ECHO LEFT VENTRICLE DIASTOLIC VOLUME (MOD BIPLANE) 2D: 62 ML
SL CV PED ECHO LEFT VENTRICLE SYSTOLIC VOLUME (MOD BIPLANE) 2D: 25 ML
TR MAX PG: 19 MMHG
TRICUSPID ANNULAR PLANE SYSTOLIC EXCURSION: 3.1 CM
TRICUSPID VALVE PEAK REGURGITATION VELOCITY: 2.18 M/S

## 2022-02-02 PROCEDURE — 93306 TTE W/DOPPLER COMPLETE: CPT

## 2022-02-02 PROCEDURE — 93306 TTE W/DOPPLER COMPLETE: CPT | Performed by: INTERNAL MEDICINE

## 2022-02-02 NOTE — PRE-PROCEDURE INSTRUCTIONS
Pre-Surgery Instructions:  Have you had / have a sore throat? No   have you had / have a cough less than 1 week? no  Have you had / have a fever greater than 100 0 - 100  4? no  Are you experiencing any shortness of breath? No    Pre procedure instructions given, verbalizes understanding all questions answered at this time awaiting TOS call on 2/8  covid policy reviewed       Medication Instructions    aspirin 325 mg tablet Patient was instructed by Physician and understands  Taking 2/8    carvedilol (COREG) 12 5 mg tablet Instructed patient per Anesthesia Guidelines  TAKING DOS    cetirizine (ZyrTEC) 10 mg tablet Instructed patient per Anesthesia Guidelines  taking 2/8    Cholecalciferol (VITAMIN D3 PO) Instructed patient per Anesthesia Guidelines  stopping for surgery    glipiZIDE (GLUCOTROL) 5 mg tablet Instructed patient per Anesthesia Guidelines  HOLD ON DOS    Januvia 100 MG tablet Instructed patient per Anesthesia Guidelines  HOLD ON DOS    losartan (COZAAR) 100 MG tablet Instructed patient per Anesthesia Guidelines  HOLD ON DOS    metFORMIN (GLUCOPHAGE) 500 mg tablet Instructed patient per Anesthesia Guidelines  HOLD ON DOS    Multiple Vitamin-Folic Acid TABS Instructed patient per Anesthesia Guidelines  stopping for surgery     rosuvastatin (CRESTOR) 40 MG tablet Instructed patient per Anesthesia Guidelines  taking 2/8

## 2022-02-03 ENCOUNTER — TELEMEDICINE (OUTPATIENT)
Dept: OBGYN CLINIC | Facility: CLINIC | Age: 62
End: 2022-02-03

## 2022-02-03 VITALS — WEIGHT: 213 LBS | BODY MASS INDEX: 36.37 KG/M2 | HEIGHT: 64 IN

## 2022-02-03 DIAGNOSIS — Z01.818 PREOP EXAMINATION: Primary | ICD-10-CM

## 2022-02-03 DIAGNOSIS — R19.00 PELVIC MASS: ICD-10-CM

## 2022-02-03 PROCEDURE — PREOP: Performed by: OBSTETRICS & GYNECOLOGY

## 2022-02-03 NOTE — H&P (VIEW-ONLY)
History & Physical - OB/GYN   Odette Cota 64 y o  female MRN: [id number]  Unit/Bed#:  Encounter: [id number]  Virtual Visit:    The patient was identified by name and date of birth  Odette Cota was informed that this is a telemedicine visit and that the visit is being conducted through [employer] and patient was informed that this is not a secure, HIPAA-compliant platform  She agrees to proceed     My office door was closed  The patient was notified the following individuals were present in the room Vee Her and Winifred Hodgson [place] student  She acknowledged consent and understanding of privacy and security of the video platform  The patient has agreed to participate and understands they can discontinue the visit at any time  Patient is aware this is a billable service  Patient is located in the following state in which I hold an active license [place]      Chief complaint:  preop    HPI:  Ms Mercedes Sorensen is a 64 y o  postmenopausal woman who presents with a large pelvic mass suggestive of fibroid for preop exam for ROMÁN, BSO  She reports similar back and pelvic pressure that are unchanged over time  She denies fever/chills, chest pain, palpitations, shortness of breath, nausea/vomiting, or other pains  The procedure was reviewed in detail with the patient  We reviewed the risks of the procedure include bleeding, infection, injury to bowel/bladder/ureters/neurovascular structures, or diagnosis of occult pathology  We reviewed that her EMB was not adequate for assessment due to difficulties accessing the uterus  Based on imaging and exam, I do not believe another attempt will yield better sampling  We discussed that an EMB is performed to reasonably exclude malignancy, but that this only assesses the endometrium and not the area of concern for her  We discussed her a1C, which is 7 2%  We discussed an a1c >7% is associated with an increase in surgical site infection   However, her a1c is improved from 6wk prior at 8% and I encouraged her to continue current regimen given improvements  We reviewed preop instructions, inpatient surgery, recovery, and follow up  Patient had the opportunity to ask questions, which were answered to her satisfaction  Informed consent was obtained  We also reviewed [place] form stating she is no longer able to bear her own children after this procedure  She acknowledges this info and again consents to proceed          Active Complications:  Patient Active Problem List   Diagnosis    Anxiety    Atherosclerotic heart disease of native coronary artery without angina pectoris    Dyslipidemia    Benign essential hypertension    Obesity    Type 2 diabetes mellitus without complication, without long-term current use of insulin (HCC)    Leukocytosis    Presence of drug coated stent in right coronary artery    Left bundle branch block (LBBB)    Tobacco abuse    Acute bilateral low back pain with bilateral sciatica    Body mass index (BMI) 36 0-36 9, adult    LLQ pain    Pre-op evaluation    Fibroid uterus    Adnexal mass         PMH:  Past Medical History:   Diagnosis Date    Cardiac disease     MI May 25, 2013    Chronic neck pain     Last Assessed:11/28/16    Diabetes mellitus ([facility] [address]     Hyperlipidemia     Hypertension     Left bundle branch block     Last Assessed:12/5/13    Mild depression ([facility] [address] )     Last Assessed:11/28/16    Tonsillar hypertrophy     Last Assessed:8/27/13       PSH:  Past Surgical History:   Procedure Laterality Date    CARDIAC SURGERY      1 stent placed    CORONARY ANGIOPLASTY WITH STENT PLACEMENT  05/28/2013    per All[employer]: Cath stent 1 type drug-eluting, mild RCA       Social Hx:  Social History     Tobacco Use    Smoking status: Current Every Day Smoker     Packs/day: 0 50     Years: 20 00     Pack years: 10 00     Types: Cigarettes    Smokeless tobacco: Never Used    Tobacco comment: cutting back  education given   Vaping Use    Vaping Use: Never used Substance Use Topics    Alcohol use: No    Drug use: No     Comment: occassionally          Meds:  Current Outpatient Medications on File Prior to Visit   Medication Sig Dispense Refill    aspirin 325 mg tablet Take 325 mg by mouth daily      carvedilol (COREG) 12 5 mg tablet take 1 tablet by mouth twice a day with meals 180 tablet 3    cetirizine (ZyrTEC) 10 mg tablet take 1 tablet by mouth once daily 30 tablet 3    Cholecalciferol (VITAMIN D3 PO) Take 2,000 Units by mouth 2 (two) times a day      cyanocobalamin 2000 MCG tablet Take 2,000 mcg by mouth daily      fluticasone (FLONASE) 50 mcg/act nasal spray 1 spray into each nostril daily 15 8 mL 5    glipiZIDE (GLUCOTROL) 5 mg tablet take 1 tablet by mouth once daily 30 tablet 3    Glucose Blood (ONETOUCH TEST VI) by In Vitro route      glucose blood (OneTouch Verio) test strip Test 3 times daily 100 each 5    Januvia 100 MG tablet Take 1 tablet (100 mg total) by mouth daily 30 tablet 3    Lancets (OneTouch Delica Plus [id number]) MISC use 1 LANCET to TEST BLOOD SUGAR three times a day 100 each 1    losartan (COZAAR) 100 MG tablet take 1 tablet by mouth once daily 90 tablet 3    metFORMIN (GLUCOPHAGE) 500 mg tablet take 2 tablets by mouth every morning and 2 tablets by mouth every evening 120 tablet 5    Multiple Vitamin-Folic Acid TABS Take by mouth      nitroglycerin (NITROSTAT) 0 4 mg SL tablet Place 1 tablet (0 4 mg total) under the tongue every 5 (five) minutes as needed for chest pain 30 tablet 3    nystatin (MYCOSTATIN) cream Apply topically 2 (two) times a day 30 g 0    nystatin (MYCOSTATIN) powder Apply topically 2 (two) times a day 60 g 3    rosuvastatin (CRESTOR) 40 MG tablet take 1 tablet by mouth once daily 90 tablet 3     No current facility-administered medications on file prior to visit  Allergies:   Allergies   Allergen Reactions    Hctz [Hydrochlorothiazide] Shortness Of Breath and Dizziness    Wellbutrin [Bupropion] Shortness Of Breath     Category: Adverse Reaction;     Ace Inhibitors Cough           Physical Exam:  Ht 5' 4" (1 626 m)   Wt 96 6 kg (213 lb)   BMI 36 56 kg/m²     Physical Exam  Constitutional:       General: She is not in acute distress  Appearance: Normal appearance  She is well-developed  She is not ill-appearing, toxic-appearing or diaphoretic  Eyes:      General: No scleral icterus  Right eye: No discharge  Left eye: No discharge  Conjunctiva/sclera: Conjunctivae normal    Pulmonary:      Effort: Pulmonary effort is normal  No respiratory distress  Neurological:      Mental Status: She is alert  Psychiatric:         Mood and Affect: Mood normal          Behavior: Behavior normal          Thought Content: Thought content normal          Judgment: Judgment normal              Assessment:   64 y o  postmenopausal woman who presents with a large pelvic mass suggestive of fibroid for preop exam for ROMÁN, BSO  Plan:   1  To OR as planned  2  NPO from midnight preop  3  Discussed surgical scrub protocol  4  Discussed ERAS carb drink protocol; reviewed must be CLEAR ensure  5   Return 1wk postop for exam

## 2022-02-03 NOTE — PROGRESS NOTES
History & Physical - OB/GYN   Adrian Gatica 64 y o  female MRN: [id number]  Unit/Bed#:  Encounter: [id number]  Virtual Visit:    The patient was identified by name and date of birth  Adrian Gatica was informed that this is a telemedicine visit and that the visit is being conducted through [employer] and patient was informed that this is not a secure, HIPAA-compliant platform  She agrees to proceed     My office door was closed  The patient was notified the following individuals were present in the room Vee Pelaez and Angélica Mi [place] student  She acknowledged consent and understanding of privacy and security of the video platform  The patient has agreed to participate and understands they can discontinue the visit at any time  Patient is aware this is a billable service  Patient is located in the following state in which I hold an active license [place]      Chief complaint:  preop    HPI:  Ms Mara Witt is a 64 y o  postmenopausal woman who presents with a large pelvic mass suggestive of fibroid for preop exam for ROMÁN, BSO  She reports similar back and pelvic pressure that are unchanged over time  She denies fever/chills, chest pain, palpitations, shortness of breath, nausea/vomiting, or other pains  The procedure was reviewed in detail with the patient  We reviewed the risks of the procedure include bleeding, infection, injury to bowel/bladder/ureters/neurovascular structures, or diagnosis of occult pathology  We reviewed that her EMB was not adequate for assessment due to difficulties accessing the uterus  Based on imaging and exam, I do not believe another attempt will yield better sampling  We discussed that an EMB is performed to reasonably exclude malignancy, but that this only assesses the endometrium and not the area of concern for her  We discussed her a1C, which is 7 2%  We discussed an a1c >7% is associated with an increase in surgical site infection   However, her a1c is improved from 6wk prior at 8% and I encouraged her to continue current regimen given improvements  We reviewed preop instructions, inpatient surgery, recovery, and follow up  Patient had the opportunity to ask questions, which were answered to her satisfaction  Informed consent was obtained  We also reviewed [place] stating she is no longer able to bear her own children after this procedure  She acknowledges this info and again consents to proceed          Active Complications:  Patient Active Problem List   Diagnosis    Anxiety    Atherosclerotic heart disease of native coronary artery without angina pectoris    Dyslipidemia    Benign essential hypertension    Obesity    Type 2 diabetes mellitus without complication, without long-term current use of insulin (HCC)    Leukocytosis    Presence of drug coated stent in right coronary artery    Left bundle branch block (LBBB)    Tobacco abuse    Acute bilateral low back pain with bilateral sciatica    Body mass index (BMI) 36 0-36 9, adult    LLQ pain    Pre-op evaluation    Fibroid uterus    Adnexal mass         PMH:  Past Medical History:   Diagnosis Date    Cardiac disease     MI May 25, 2013    Chronic neck pain     Last Assessed:11/28/16    Diabetes mellitus ([facility] [address]     Hyperlipidemia     Hypertension     Left bundle branch block     Last Assessed:12/5/13    Mild depression ([facility] [address] )     Last Assessed:11/28/16    Tonsillar hypertrophy     Last Assessed:8/27/13       PSH:  Past Surgical History:   Procedure Laterality Date    CARDIAC SURGERY      1 stent placed    CORONARY ANGIOPLASTY WITH STENT PLACEMENT  05/28/2013    per Allscripts: Cath stent 1 type drug-eluting, mild RCA       Social Hx:  Social History     Tobacco Use    Smoking status: Current Every Day Smoker     Packs/day: 0 50     Years: 20 00     Pack years: 10 00     Types: Cigarettes    Smokeless tobacco: Never Used    Tobacco comment: cutting back  education given   Vaping Use    Vaping Use: Never used Substance Use Topics    Alcohol use: No    Drug use: No     Comment: occassionally          Meds:  Current Outpatient Medications on File Prior to Visit   Medication Sig Dispense Refill    aspirin 325 mg tablet Take 325 mg by mouth daily      carvedilol (COREG) 12 5 mg tablet take 1 tablet by mouth twice a day with meals 180 tablet 3    cetirizine (ZyrTEC) 10 mg tablet take 1 tablet by mouth once daily 30 tablet 3    Cholecalciferol (VITAMIN D3 PO) Take 2,000 Units by mouth 2 (two) times a day      cyanocobalamin 2000 MCG tablet Take 2,000 mcg by mouth daily      fluticasone (FLONASE) 50 mcg/act nasal spray 1 spray into each nostril daily 15 8 mL 5    glipiZIDE (GLUCOTROL) 5 mg tablet take 1 tablet by mouth once daily 30 tablet 3    Glucose Blood (ONETOUCH TEST VI) by In Vitro route      glucose blood (OneTouch Verio) test strip Test 3 times daily 100 each 5    Januvia 100 MG tablet Take 1 tablet (100 mg total) by mouth daily 30 tablet 3    Lancets (OneTouch Delica Plus [id number]) MISC use 1 LANCET to TEST BLOOD SUGAR three times a day 100 each 1    losartan (COZAAR) 100 MG tablet take 1 tablet by mouth once daily 90 tablet 3    metFORMIN (GLUCOPHAGE) 500 mg tablet take 2 tablets by mouth every morning and 2 tablets by mouth every evening 120 tablet 5    Multiple Vitamin-Folic Acid TABS Take by mouth      nitroglycerin (NITROSTAT) 0 4 mg SL tablet Place 1 tablet (0 4 mg total) under the tongue every 5 (five) minutes as needed for chest pain 30 tablet 3    nystatin (MYCOSTATIN) cream Apply topically 2 (two) times a day 30 g 0    nystatin (MYCOSTATIN) powder Apply topically 2 (two) times a day 60 g 3    rosuvastatin (CRESTOR) 40 MG tablet take 1 tablet by mouth once daily 90 tablet 3     No current facility-administered medications on file prior to visit  Allergies:   Allergies   Allergen Reactions    Hctz [Hydrochlorothiazide] Shortness Of Breath and Dizziness    Wellbutrin [Bupropion] Shortness Of Breath     Category: Adverse Reaction;     Ace Inhibitors Cough           Physical Exam:  Ht 5' 4" (1 626 m)   Wt 96 6 kg (213 lb)   BMI 36 56 kg/m²     Physical Exam  Constitutional:       General: She is not in acute distress  Appearance: Normal appearance  She is well-developed  She is not ill-appearing, toxic-appearing or diaphoretic  Eyes:      General: No scleral icterus  Right eye: No discharge  Left eye: No discharge  Conjunctiva/sclera: Conjunctivae normal    Pulmonary:      Effort: Pulmonary effort is normal  No respiratory distress  Neurological:      Mental Status: She is alert  Psychiatric:         Mood and Affect: Mood normal          Behavior: Behavior normal          Thought Content: Thought content normal          Judgment: Judgment normal              Assessment:   64 y o  postmenopausal woman who presents with a large pelvic mass suggestive of fibroid for preop exam for ROMÁN, BSO  Plan:   1  To OR as planned  2  NPO from midnight preop  3  Discussed surgical scrub protocol  4  Discussed ERAS carb drink protocol; reviewed must be CLEAR ensure  5   Return 1wk postop for exam

## 2022-02-04 ENCOUNTER — ANESTHESIA EVENT (OUTPATIENT)
Dept: PERIOP | Facility: HOSPITAL | Age: 62
DRG: 519 | End: 2022-02-04
Payer: COMMERCIAL

## 2022-02-09 ENCOUNTER — HOSPITAL ENCOUNTER (INPATIENT)
Facility: HOSPITAL | Age: 62
LOS: 2 days | Discharge: HOME | DRG: 519 | End: 2022-02-11
Attending: OBSTETRICS & GYNECOLOGY | Admitting: OBSTETRICS & GYNECOLOGY
Payer: COMMERCIAL

## 2022-02-09 ENCOUNTER — ANESTHESIA (OUTPATIENT)
Dept: PERIOP | Facility: HOSPITAL | Age: 62
DRG: 519 | End: 2022-02-09
Payer: COMMERCIAL

## 2022-02-09 DIAGNOSIS — R19.00 PELVIC MASS: ICD-10-CM

## 2022-02-09 DIAGNOSIS — R94.4 DECREASED CALCULATED GFR: ICD-10-CM

## 2022-02-09 DIAGNOSIS — Z98.890 S/P LAPAROTOMY: Primary | ICD-10-CM

## 2022-02-09 DIAGNOSIS — N95.0 POSTMENOPAUSAL BLEEDING: ICD-10-CM

## 2022-02-09 LAB
ABO GROUP BLD: NORMAL
ABO GROUP BLD: NORMAL
BASOPHILS # BLD AUTO: 0.07 THOUSANDS/ΜL (ref 0–0.1)
BASOPHILS NFR BLD AUTO: 1 % (ref 0–1)
BLD GP AB SCN SERPL QL: NEGATIVE
EOSINOPHIL # BLD AUTO: 0.25 THOUSAND/ΜL (ref 0–0.61)
EOSINOPHIL NFR BLD AUTO: 2 % (ref 0–6)
ERYTHROCYTE [DISTWIDTH] IN BLOOD BY AUTOMATED COUNT: 15.6 % (ref 11.6–15.1)
FLUAV RNA RESP QL NAA+PROBE: NEGATIVE
FLUBV RNA RESP QL NAA+PROBE: NEGATIVE
GLUCOSE SERPL-MCNC: 183 MG/DL (ref 65–140)
GLUCOSE SERPL-MCNC: 205 MG/DL (ref 65–140)
GLUCOSE SERPL-MCNC: 261 MG/DL (ref 65–140)
GLUCOSE SERPL-MCNC: 264 MG/DL (ref 65–140)
GLUCOSE SERPL-MCNC: 286 MG/DL (ref 65–140)
HCT VFR BLD AUTO: 43.4 % (ref 34.8–46.1)
HGB BLD-MCNC: 14 G/DL (ref 11.5–15.4)
IMM GRANULOCYTES # BLD AUTO: 0.08 THOUSAND/UL (ref 0–0.2)
IMM GRANULOCYTES NFR BLD AUTO: 1 % (ref 0–2)
LYMPHOCYTES # BLD AUTO: 3.31 THOUSANDS/ΜL (ref 0.6–4.47)
LYMPHOCYTES NFR BLD AUTO: 23 % (ref 14–44)
MCH RBC QN AUTO: 28.1 PG (ref 26.8–34.3)
MCHC RBC AUTO-ENTMCNC: 32.3 G/DL (ref 31.4–37.4)
MCV RBC AUTO: 87 FL (ref 82–98)
MONOCYTES # BLD AUTO: 1.18 THOUSAND/ΜL (ref 0.17–1.22)
MONOCYTES NFR BLD AUTO: 8 % (ref 4–12)
NEUTROPHILS # BLD AUTO: 9.61 THOUSANDS/ΜL (ref 1.85–7.62)
NEUTS SEG NFR BLD AUTO: 65 % (ref 43–75)
NRBC BLD AUTO-RTO: 0 /100 WBCS
PLATELET # BLD AUTO: 262 THOUSANDS/UL (ref 149–390)
PMV BLD AUTO: 9.6 FL (ref 8.9–12.7)
RBC # BLD AUTO: 4.98 MILLION/UL (ref 3.81–5.12)
RH BLD: POSITIVE
RH BLD: POSITIVE
RSV RNA RESP QL NAA+PROBE: NEGATIVE
SARS-COV-2 RNA RESP QL NAA+PROBE: NEGATIVE
SPECIMEN EXPIRATION DATE: NORMAL
WBC # BLD AUTO: 14.5 THOUSAND/UL (ref 4.31–10.16)

## 2022-02-09 PROCEDURE — 0UT20ZZ RESECTION OF BILATERAL OVARIES, OPEN APPROACH: ICD-10-PCS | Performed by: OBSTETRICS & GYNECOLOGY

## 2022-02-09 PROCEDURE — 88307 TISSUE EXAM BY PATHOLOGIST: CPT | Performed by: PATHOLOGY

## 2022-02-09 PROCEDURE — 88342 IMHCHEM/IMCYTCHM 1ST ANTB: CPT | Performed by: PATHOLOGY

## 2022-02-09 PROCEDURE — 0UT70ZZ RESECTION OF BILATERAL FALLOPIAN TUBES, OPEN APPROACH: ICD-10-PCS | Performed by: OBSTETRICS & GYNECOLOGY

## 2022-02-09 PROCEDURE — 88313 SPECIAL STAINS GROUP 2: CPT | Performed by: PATHOLOGY

## 2022-02-09 PROCEDURE — 88341 IMHCHEM/IMCYTCHM EA ADD ANTB: CPT | Performed by: PATHOLOGY

## 2022-02-09 PROCEDURE — 85025 COMPLETE CBC W/AUTO DIFF WBC: CPT | Performed by: OBSTETRICS & GYNECOLOGY

## 2022-02-09 PROCEDURE — 0241U HB NFCT DS VIR RESP RNA 4 TRGT: CPT | Performed by: ANESTHESIOLOGY

## 2022-02-09 PROCEDURE — 86850 RBC ANTIBODY SCREEN: CPT | Performed by: OBSTETRICS & GYNECOLOGY

## 2022-02-09 PROCEDURE — 88331 PATH CONSLTJ SURG 1 BLK 1SPC: CPT | Performed by: PATHOLOGY

## 2022-02-09 PROCEDURE — 0UT90ZZ RESECTION OF UTERUS, OPEN APPROACH: ICD-10-PCS | Performed by: OBSTETRICS & GYNECOLOGY

## 2022-02-09 PROCEDURE — 0TJB8ZZ INSPECTION OF BLADDER, VIA NATURAL OR ARTIFICIAL OPENING ENDOSCOPIC: ICD-10-PCS | Performed by: OBSTETRICS & GYNECOLOGY

## 2022-02-09 PROCEDURE — 58150 TOTAL HYSTERECTOMY: CPT | Performed by: OBSTETRICS & GYNECOLOGY

## 2022-02-09 PROCEDURE — 88305 TISSUE EXAM BY PATHOLOGIST: CPT | Performed by: PATHOLOGY

## 2022-02-09 PROCEDURE — 86900 BLOOD TYPING SEROLOGIC ABO: CPT | Performed by: OBSTETRICS & GYNECOLOGY

## 2022-02-09 PROCEDURE — 82948 REAGENT STRIP/BLOOD GLUCOSE: CPT

## 2022-02-09 PROCEDURE — 86901 BLOOD TYPING SEROLOGIC RH(D): CPT | Performed by: OBSTETRICS & GYNECOLOGY

## 2022-02-09 PROCEDURE — 99024 POSTOP FOLLOW-UP VISIT: CPT | Performed by: OBSTETRICS & GYNECOLOGY

## 2022-02-09 RX ORDER — LIDOCAINE HYDROCHLORIDE 20 MG/ML
INJECTION, SOLUTION EPIDURAL; INFILTRATION; INTRACAUDAL; PERINEURAL AS NEEDED
Status: DISCONTINUED | OUTPATIENT
Start: 2022-02-09 | End: 2022-02-09

## 2022-02-09 RX ORDER — SODIUM CHLORIDE 9 MG/ML
125 INJECTION, SOLUTION INTRAVENOUS CONTINUOUS
Status: DISCONTINUED | OUTPATIENT
Start: 2022-02-09 | End: 2022-02-10

## 2022-02-09 RX ORDER — PROPOFOL 10 MG/ML
INJECTION, EMULSION INTRAVENOUS AS NEEDED
Status: DISCONTINUED | OUTPATIENT
Start: 2022-02-09 | End: 2022-02-09

## 2022-02-09 RX ORDER — GLIPIZIDE 5 MG/1
5 TABLET ORAL
Status: DISCONTINUED | OUTPATIENT
Start: 2022-02-10 | End: 2022-02-11 | Stop reason: HOSPADM

## 2022-02-09 RX ORDER — MAGNESIUM HYDROXIDE 1200 MG/15ML
LIQUID ORAL AS NEEDED
Status: DISCONTINUED | OUTPATIENT
Start: 2022-02-09 | End: 2022-02-09 | Stop reason: HOSPADM

## 2022-02-09 RX ORDER — ONDANSETRON 2 MG/ML
INJECTION INTRAMUSCULAR; INTRAVENOUS AS NEEDED
Status: DISCONTINUED | OUTPATIENT
Start: 2022-02-09 | End: 2022-02-09

## 2022-02-09 RX ORDER — MIDAZOLAM HYDROCHLORIDE 2 MG/2ML
INJECTION, SOLUTION INTRAMUSCULAR; INTRAVENOUS AS NEEDED
Status: DISCONTINUED | OUTPATIENT
Start: 2022-02-09 | End: 2022-02-09

## 2022-02-09 RX ORDER — ACETAMINOPHEN 325 MG/1
975 TABLET ORAL EVERY 6 HOURS SCHEDULED
Status: DISCONTINUED | OUTPATIENT
Start: 2022-02-10 | End: 2022-02-11 | Stop reason: HOSPADM

## 2022-02-09 RX ORDER — HYDROMORPHONE HCL/PF 1 MG/ML
SYRINGE (ML) INJECTION AS NEEDED
Status: DISCONTINUED | OUTPATIENT
Start: 2022-02-09 | End: 2022-02-09

## 2022-02-09 RX ORDER — SODIUM CHLORIDE, SODIUM LACTATE, POTASSIUM CHLORIDE, CALCIUM CHLORIDE 600; 310; 30; 20 MG/100ML; MG/100ML; MG/100ML; MG/100ML
125 INJECTION, SOLUTION INTRAVENOUS CONTINUOUS
Status: DISCONTINUED | OUTPATIENT
Start: 2022-02-09 | End: 2022-02-09

## 2022-02-09 RX ORDER — HYDROMORPHONE HCL/PF 1 MG/ML
0.5 SYRINGE (ML) INJECTION
Status: DISCONTINUED | OUTPATIENT
Start: 2022-02-09 | End: 2022-02-09 | Stop reason: HOSPADM

## 2022-02-09 RX ORDER — OXYCODONE HYDROCHLORIDE 10 MG/1
10 TABLET ORAL EVERY 4 HOURS PRN
Status: DISCONTINUED | OUTPATIENT
Start: 2022-02-10 | End: 2022-02-11 | Stop reason: HOSPADM

## 2022-02-09 RX ORDER — DIPHENHYDRAMINE HYDROCHLORIDE 50 MG/ML
25 INJECTION INTRAMUSCULAR; INTRAVENOUS EVERY 6 HOURS PRN
Status: DISCONTINUED | OUTPATIENT
Start: 2022-02-09 | End: 2022-02-11 | Stop reason: HOSPADM

## 2022-02-09 RX ORDER — ONDANSETRON 2 MG/ML
4 INJECTION INTRAMUSCULAR; INTRAVENOUS ONCE AS NEEDED
Status: COMPLETED | OUTPATIENT
Start: 2022-02-09 | End: 2022-02-09

## 2022-02-09 RX ORDER — ONDANSETRON 2 MG/ML
4 INJECTION INTRAMUSCULAR; INTRAVENOUS EVERY 6 HOURS PRN
Status: DISCONTINUED | OUTPATIENT
Start: 2022-02-09 | End: 2022-02-11 | Stop reason: HOSPADM

## 2022-02-09 RX ORDER — ATORVASTATIN CALCIUM 80 MG/1
80 TABLET, FILM COATED ORAL
Status: DISCONTINUED | OUTPATIENT
Start: 2022-02-10 | End: 2022-02-11 | Stop reason: HOSPADM

## 2022-02-09 RX ORDER — LOSARTAN POTASSIUM 50 MG/1
100 TABLET ORAL DAILY
Status: DISCONTINUED | OUTPATIENT
Start: 2022-02-10 | End: 2022-02-09

## 2022-02-09 RX ORDER — NEOSTIGMINE METHYLSULFATE 1 MG/ML
INJECTION INTRAVENOUS AS NEEDED
Status: DISCONTINUED | OUTPATIENT
Start: 2022-02-09 | End: 2022-02-09

## 2022-02-09 RX ORDER — INSULIN GLARGINE 100 [IU]/ML
10 INJECTION, SOLUTION SUBCUTANEOUS
Status: DISCONTINUED | OUTPATIENT
Start: 2022-02-09 | End: 2022-02-10

## 2022-02-09 RX ORDER — OXYCODONE HYDROCHLORIDE 5 MG/1
5 TABLET ORAL EVERY 4 HOURS PRN
Status: DISCONTINUED | OUTPATIENT
Start: 2022-02-10 | End: 2022-02-11 | Stop reason: HOSPADM

## 2022-02-09 RX ORDER — MEPERIDINE HYDROCHLORIDE 25 MG/ML
12.5 INJECTION INTRAMUSCULAR; INTRAVENOUS; SUBCUTANEOUS
Status: DISCONTINUED | OUTPATIENT
Start: 2022-02-09 | End: 2022-02-09 | Stop reason: HOSPADM

## 2022-02-09 RX ORDER — GLYCOPYRROLATE 0.2 MG/ML
INJECTION INTRAMUSCULAR; INTRAVENOUS AS NEEDED
Status: DISCONTINUED | OUTPATIENT
Start: 2022-02-09 | End: 2022-02-09

## 2022-02-09 RX ORDER — SODIUM CHLORIDE 9 MG/ML
INJECTION, SOLUTION INTRAVENOUS CONTINUOUS PRN
Status: DISCONTINUED | OUTPATIENT
Start: 2022-02-09 | End: 2022-02-09

## 2022-02-09 RX ORDER — HYDROMORPHONE HCL/PF 1 MG/ML
0.5 SYRINGE (ML) INJECTION
Status: DISCONTINUED | OUTPATIENT
Start: 2022-02-10 | End: 2022-02-11 | Stop reason: HOSPADM

## 2022-02-09 RX ORDER — CEFAZOLIN SODIUM 2 G/50ML
2000 SOLUTION INTRAVENOUS ONCE
Status: COMPLETED | OUTPATIENT
Start: 2022-02-09 | End: 2022-02-09

## 2022-02-09 RX ORDER — DOCUSATE SODIUM 100 MG/1
100 CAPSULE, LIQUID FILLED ORAL 2 TIMES DAILY
Status: DISCONTINUED | OUTPATIENT
Start: 2022-02-10 | End: 2022-02-11 | Stop reason: HOSPADM

## 2022-02-09 RX ORDER — FENTANYL CITRATE 50 UG/ML
INJECTION, SOLUTION INTRAMUSCULAR; INTRAVENOUS AS NEEDED
Status: DISCONTINUED | OUTPATIENT
Start: 2022-02-09 | End: 2022-02-09

## 2022-02-09 RX ORDER — FENTANYL CITRATE/PF 50 MCG/ML
25 SYRINGE (ML) INJECTION
Status: COMPLETED | OUTPATIENT
Start: 2022-02-09 | End: 2022-02-09

## 2022-02-09 RX ORDER — DEXAMETHASONE SODIUM PHOSPHATE 4 MG/ML
INJECTION, SOLUTION INTRA-ARTICULAR; INTRALESIONAL; INTRAMUSCULAR; INTRAVENOUS; SOFT TISSUE AS NEEDED
Status: DISCONTINUED | OUTPATIENT
Start: 2022-02-09 | End: 2022-02-09

## 2022-02-09 RX ORDER — GLIPIZIDE 5 MG/1
5 TABLET ORAL DAILY
Status: DISCONTINUED | OUTPATIENT
Start: 2022-02-10 | End: 2022-02-09

## 2022-02-09 RX ORDER — KETOROLAC TROMETHAMINE 30 MG/ML
INJECTION, SOLUTION INTRAMUSCULAR; INTRAVENOUS AS NEEDED
Status: DISCONTINUED | OUTPATIENT
Start: 2022-02-09 | End: 2022-02-09

## 2022-02-09 RX ORDER — ROCURONIUM BROMIDE 10 MG/ML
INJECTION, SOLUTION INTRAVENOUS AS NEEDED
Status: DISCONTINUED | OUTPATIENT
Start: 2022-02-09 | End: 2022-02-09

## 2022-02-09 RX ORDER — CARVEDILOL 6.25 MG/1
12.5 TABLET ORAL 2 TIMES DAILY WITH MEALS
Status: DISCONTINUED | OUTPATIENT
Start: 2022-02-10 | End: 2022-02-11 | Stop reason: HOSPADM

## 2022-02-09 RX ADMIN — FENTANYL CITRATE 25 MCG: 50 INJECTION, SOLUTION INTRAMUSCULAR; INTRAVENOUS at 14:11

## 2022-02-09 RX ADMIN — INSULIN HUMAN 5 UNITS: 100 INJECTION, SOLUTION PARENTERAL at 14:07

## 2022-02-09 RX ADMIN — FENTANYL CITRATE 50 MCG: 50 INJECTION INTRAMUSCULAR; INTRAVENOUS at 10:39

## 2022-02-09 RX ADMIN — SODIUM CHLORIDE: 0.9 INJECTION, SOLUTION INTRAVENOUS at 11:15

## 2022-02-09 RX ADMIN — SODIUM CHLORIDE 125 ML/HR: 0.9 INJECTION, SOLUTION INTRAVENOUS at 23:26

## 2022-02-09 RX ADMIN — FENTANYL CITRATE 25 MCG: 50 INJECTION, SOLUTION INTRAMUSCULAR; INTRAVENOUS at 13:47

## 2022-02-09 RX ADMIN — FENTANYL CITRATE 25 MCG: 50 INJECTION, SOLUTION INTRAMUSCULAR; INTRAVENOUS at 13:54

## 2022-02-09 RX ADMIN — FENTANYL CITRATE 25 MCG: 50 INJECTION, SOLUTION INTRAMUSCULAR; INTRAVENOUS at 14:05

## 2022-02-09 RX ADMIN — HYDROMORPHONE HYDROCHLORIDE 0.5 MG: 1 INJECTION, SOLUTION INTRAMUSCULAR; INTRAVENOUS; SUBCUTANEOUS at 11:00

## 2022-02-09 RX ADMIN — DEXAMETHASONE SODIUM PHOSPHATE 8 MG: 4 INJECTION INTRA-ARTICULAR; INTRALESIONAL; INTRAMUSCULAR; INTRAVENOUS; SOFT TISSUE at 10:08

## 2022-02-09 RX ADMIN — INSULIN LISPRO 4 UNITS: 100 INJECTION, SOLUTION INTRAVENOUS; SUBCUTANEOUS at 21:51

## 2022-02-09 RX ADMIN — ONDANSETRON 4 MG: 2 INJECTION INTRAMUSCULAR; INTRAVENOUS at 12:49

## 2022-02-09 RX ADMIN — HYDROMORPHONE HYDROCHLORIDE 0.5 MG: 1 INJECTION, SOLUTION INTRAMUSCULAR; INTRAVENOUS; SUBCUTANEOUS at 14:56

## 2022-02-09 RX ADMIN — LIDOCAINE HYDROCHLORIDE 100 MG: 20 INJECTION, SOLUTION EPIDURAL; INFILTRATION; INTRACAUDAL; PERINEURAL at 10:08

## 2022-02-09 RX ADMIN — HYDROMORPHONE HYDROCHLORIDE 0.5 MG: 1 INJECTION, SOLUTION INTRAMUSCULAR; INTRAVENOUS; SUBCUTANEOUS at 10:42

## 2022-02-09 RX ADMIN — Medication: at 14:33

## 2022-02-09 RX ADMIN — SODIUM CHLORIDE, SODIUM LACTATE, POTASSIUM CHLORIDE, AND CALCIUM CHLORIDE: .6; .31; .03; .02 INJECTION, SOLUTION INTRAVENOUS at 11:51

## 2022-02-09 RX ADMIN — INSULIN GLARGINE 10 UNITS: 100 INJECTION, SOLUTION SUBCUTANEOUS at 21:51

## 2022-02-09 RX ADMIN — CEFAZOLIN SODIUM 2000 MG: 2 SOLUTION INTRAVENOUS at 10:00

## 2022-02-09 RX ADMIN — ONDANSETRON 4 MG: 2 INJECTION INTRAMUSCULAR; INTRAVENOUS at 13:42

## 2022-02-09 RX ADMIN — SODIUM CHLORIDE: 0.9 INJECTION, SOLUTION INTRAVENOUS at 10:09

## 2022-02-09 RX ADMIN — GLYCOPYRROLATE 0.4 MG: 0.2 INJECTION, SOLUTION INTRAMUSCULAR; INTRAVENOUS at 13:17

## 2022-02-09 RX ADMIN — INSULIN HUMAN 5 UNITS: 100 INJECTION, SOLUTION PARENTERAL at 14:51

## 2022-02-09 RX ADMIN — SODIUM CHLORIDE, SODIUM LACTATE, POTASSIUM CHLORIDE, AND CALCIUM CHLORIDE 125 ML/HR: .6; .31; .03; .02 INJECTION, SOLUTION INTRAVENOUS at 08:33

## 2022-02-09 RX ADMIN — INSULIN LISPRO 1 UNITS: 100 INJECTION, SOLUTION INTRAVENOUS; SUBCUTANEOUS at 16:44

## 2022-02-09 RX ADMIN — PROPOFOL 200 MG: 10 INJECTION, EMULSION INTRAVENOUS at 10:08

## 2022-02-09 RX ADMIN — ROCURONIUM BROMIDE 50 MG: 50 INJECTION, SOLUTION INTRAVENOUS at 10:08

## 2022-02-09 RX ADMIN — ROCURONIUM BROMIDE 20 MG: 50 INJECTION, SOLUTION INTRAVENOUS at 11:00

## 2022-02-09 RX ADMIN — NEOSTIGMINE METHYLSULFATE 3 MG: 1 INJECTION INTRAVENOUS at 13:17

## 2022-02-09 RX ADMIN — ROCURONIUM BROMIDE 20 MG: 50 INJECTION, SOLUTION INTRAVENOUS at 11:46

## 2022-02-09 RX ADMIN — KETOROLAC TROMETHAMINE 30 MG: 30 INJECTION, SOLUTION INTRAMUSCULAR at 12:49

## 2022-02-09 RX ADMIN — SODIUM CHLORIDE 125 ML/HR: 0.9 INJECTION, SOLUTION INTRAVENOUS at 14:58

## 2022-02-09 RX ADMIN — HYDROMORPHONE HYDROCHLORIDE 0.5 MG: 1 INJECTION, SOLUTION INTRAMUSCULAR; INTRAVENOUS; SUBCUTANEOUS at 14:20

## 2022-02-09 RX ADMIN — MIDAZOLAM 2 MG: 1 INJECTION INTRAMUSCULAR; INTRAVENOUS at 10:00

## 2022-02-09 RX ADMIN — FENTANYL CITRATE 50 MCG: 50 INJECTION INTRAMUSCULAR; INTRAVENOUS at 10:34

## 2022-02-09 RX ADMIN — FENTANYL CITRATE 100 MCG: 50 INJECTION INTRAMUSCULAR; INTRAVENOUS at 10:08

## 2022-02-09 RX ADMIN — FENTANYL CITRATE 100 MCG: 50 INJECTION INTRAMUSCULAR; INTRAVENOUS at 11:24

## 2022-02-09 NOTE — DISCHARGE SUMMARY
Discharge Summary - Gynecology  Joan Garland 64 y o  female MRN: [id number]  Unit/Bed#: OR POOL Encounter: [id number]    Admission Date: 2/9/2022   Discharge Date: 2/11/22    Attending Physician: Devendra Mattson Physician(s): None    Admitting Diagnosis:   Postmenopausal bleeding [N95 0]  Pelvic mass [R19 00]    Discharge Diagnosis:   Same as above    Procedures Performed: [facility] Hospital Course: The patient presented on day of admission for the above mentioned procedure  She was admitted for pain control and postoperative management  Her procedure was uncomplicated  The patient was continued on her home medications for T2DM and HTN  The patient's hospital course was uncomplicated  On POD 1, the patient was doing well  Her pain was well controlled  Her Hb fell from 14 0 preoperatively to 11 3 postoperatively  Her Nair was removed and she was able to void spontaneously  She was tolerating PO and and passing flatus  She was ambulating well  The patient was discharged home on POD 2 in stable condition  She was given prescriptions for oxycodone for pain control  She was asked to follow up with Dr Catina Boss in 2 weeks for a postoperative appointment  Lab Results:   Lab Results   Component Value Date    WBC 14 50 (H) 02/09/2022    HGB 14 0 02/09/2022    HCT 43 4 02/09/2022    MCV 87 02/09/2022     02/09/2022     Lab Results   Component Value Date    CALCIUM 8 8 01/27/2022    K 4 1 01/27/2022    CO2 26 01/27/2022     01/27/2022    BUN 14 01/27/2022    CREATININE 1 07 01/27/2022     Lab Results   Component Value Date/Time    POCGLU 183 (H) 02/09/2022 08:29 AM     No results found for: PTT  No results found for: INR, PROTIME        Condition at Discharge: good     Discharge Medications: See after visit summary for reconciled discharge medications provided to patient and family        Discharge instructions/Information to patient and family: See after visit summary for information provided to patient and family  Provisions for Follow-Up Care: See after visit summary for information related to follow-up care and any pertinent home health orders  Disposition: Home    Planned Readmission: No    Code Status: Full Code    Discharge Statement   I spent 15 minutes discharging the patient  This time was spent on the day of discharge  I had direct contact with the patient on the day of discharge  Additional documentation is required if more than 30 minutes were spent on discharge

## 2022-02-09 NOTE — INTERVAL H&P NOTE
H&P reviewed  After examining the patient I find no changes in the patients condition since the H&P had been written      Vitals:    02/09/22 0740   BP: 158/76   Pulse: 92   Resp: 16   Temp: 98 °F (36 7 °C)   SpO2: 96%

## 2022-02-09 NOTE — PROGRESS NOTES
Gynecology Progress note   Marlen Sadler 64 y o  female MRN: [id number]  Unit/Bed#: E5 -01 Encounter: [id number]    Subjective:    Marlen Sadler has no current complaints  Pain is present - adequately treated  Patient is currently voiding with lees  She is not ambulating  Patient is not currently passing flatus and has had no bowel movement  She is tolerating PO, and denies nausea or vomitting  Patient denies fever, chills, chest pain, shortness of breath, or calf tenderness  /58   Pulse 103   Temp 98 1 °F (36 7 °C)   Resp 17   Ht 5' 4" (1 626 m)   Wt 95 1 kg (209 lb 10 5 oz)   SpO2 98%   Breastfeeding Unknown Comment: 6 YRS LAST MENES   BMI 35 99 kg/m²     No intake/output data recorded  I/O this shift:  In: 3500 [I V :3500]  Out: 1075 [Urine:425; Blood:650]    Lab Results   Component Value Date    WBC 14 50 (H) 02/09/2022    HGB 14 0 02/09/2022    HCT 43 4 02/09/2022    MCV 87 02/09/2022     02/09/2022       Lab Results   Component Value Date    CALCIUM 8 8 01/27/2022    K 4 1 01/27/2022    CO2 26 01/27/2022     01/27/2022    BUN 14 01/27/2022    CREATININE 1 07 01/27/2022       Lab Results   Component Value Date/Time    POCGLU 205 (H) 02/09/2022 04:19 PM    POCGLU 261 (H) 02/09/2022 02:42 PM    POCGLU 264 (H) 02/09/2022 01:57 PM    POCGLU 183 (H) 02/09/2022 08:29 AM       Physical exam:     Physical Exam  Constitutional:       Appearance: Normal appearance  She is obese  Cardiovascular:      Rate and Rhythm: Normal rate  Pulses: Normal pulses  Pulmonary:      Effort: Pulmonary effort is normal    Abdominal:      Comments: Incision C/D/I   Musculoskeletal:      Cervical back: Normal range of motion  Skin:     General: Skin is warm  Neurological:      General: No focal deficit present  Mental Status: She is alert and oriented to person, place, and time     Psychiatric:         Mood and Affect: Mood normal          Behavior: Behavior normal          A/P: 64 y o  with fibroid uterus POD# 0 s/p ROMÁN, BSO and cystoscopy   1) Follow up final path for ROMÁN, BSO  2) We discussed about surgery, vertical incision and reason to perform  The frozen pathology result and critical points of surgery were discussed with patient and her sister in room  She is reporting good pain control with dilaudid PCA  3) T2DM: Home med on hold, sliding scale insulin  We will follow up, oral toleration of food and returning to regular diet  We may initiate her home anti diabetic medications     4) Diet: regular as tolerated  5) DVT PPx: SCDs  6) Encouraged incentive spirometry to reduce atelectasis and pneumonia risk  7) Encouraged ambulation as tolerated  8) Dispo: plan for discharge Post -op day 2         Bill Sawant MD  4/3/0890  8:88 PM

## 2022-02-09 NOTE — PLAN OF CARE
Problem: PAIN - ADULT  Goal: Verbalizes/displays adequate comfort level or baseline comfort level  Description: Interventions:  - Encourage patient to monitor pain and request assistance  - Assess pain using appropriate pain scale  - Administer analgesics based on type and severity of pain and evaluate response  - Implement non-pharmacological measures as appropriate and evaluate response  - Consider cultural and social influences on pain and pain management  - Notify physician/advanced practitioner if interventions unsuccessful or patient reports new pain  Outcome: Progressing     Problem: INFECTION - ADULT  Goal: Absence or prevention of progression during hospitalization  Description: INTERVENTIONS:  - Assess and monitor for signs and symptoms of infection  - Monitor lab/diagnostic results  - Monitor all insertion sites, i e  indwelling lines, tubes, and drains  - Monitor endotracheal if appropriate and nasal secretions for changes in amount and color  - [place] appropriate cooling/warming therapies per order  - Administer medications as ordered  - Instruct and encourage patient and family to use good hand hygiene technique  - Identify and instruct in appropriate isolation precautions for identified infection/condition  Outcome: Progressing  Goal: Absence of fever/infection during neutropenic period  Description: INTERVENTIONS:  - Monitor WBC    Outcome: Progressing     Problem: DISCHARGE PLANNING  Goal: Discharge to home or other facility with appropriate resources  Description: INTERVENTIONS:  - Identify barriers to discharge w/patient and caregiver  - Arrange for needed discharge resources and transportation as appropriate  - Identify discharge learning needs (meds, wound care, etc )  - Arrange for interpretive services to assist at discharge as needed  - Refer to Case Management Department for coordinating discharge planning if the patient needs post-hospital services based on physician/advanced practitioner order or complex needs related to functional status, cognitive ability, or social support system  Outcome: Progressing     Problem: Knowledge Deficit  Goal: Patient/family/caregiver demonstrates understanding of disease process, treatment plan, medications, and discharge instructions  Description: Complete learning assessment and assess knowledge base    Interventions:  - Provide teaching at level of understanding  - Provide teaching via preferred learning methods  Outcome: Progressing     Problem: GENITOURINARY - ADULT  Goal: Maintains or returns to baseline urinary function  Description: INTERVENTIONS:  - Assess urinary function  - Encourage oral fluids to ensure adequate hydration if ordered  - Administer IV fluids as ordered to ensure adequate hydration  - Administer ordered medications as needed  - Offer frequent toileting  - Follow urinary retention protocol if ordered  Outcome: Progressing  Goal: Absence of urinary retention  Description: INTERVENTIONS:  - Assess patients ability to void and empty bladder  - Monitor I/O  - Bladder scan as needed  - Discuss with physician/AP medications to alleviate retention as needed  - Discuss catheterization for long term situations as appropriate  Outcome: Progressing  Goal: Urinary catheter remains patent  Description: INTERVENTIONS:  - Assess patency of urinary catheter  - If patient has a chronic lees, consider changing catheter if non-functioning  - Follow guidelines for intermittent irrigation of non-functioning urinary catheter  Outcome: Progressing

## 2022-02-09 NOTE — DISCHARGE INSTRUCTIONS
Hysterectomy   WHAT YOU NEED TO KNOW:   A hysterectomy is surgery to remove your uterus  Your ovaries, fallopian tubes, cervix, or part of your vagina may also need to be removed  The organs and tissue that will be removed depends on your medical condition  DISCHARGE INSTRUCTIONS:   Call 911 for any of the following:   · You feel lightheaded, short of breath, and have chest pain  · You cough up blood  Seek care immediately:   · Your arm or leg feels warm, tender, and painful  It may look swollen and red  · You have increasing abdominal or pelvic pain  Contact your healthcare provider or gynecologist if:   · You have heavy vaginal bleeding that fills 1 or more sanitary pads in 1 hour  · You have a fever  · You have nausea or are vomiting  · You feel pain or burning when you urinate, or you have trouble urinating  · You have pus or a foul-smelling odor coming from your vagina  · Your wound is red, swollen, or draining pus  · You feel pressure in your rectum  · You have questions or concerns about your condition or care  Medicines:   · Prescription pain medicine  may be given  Ask your healthcare provider how to take this medicine safely  · Stool softeners  help treat or prevent constipation  · Take your medicine as directed  Contact your healthcare provider if you think your medicine is not helping or if you have side effects  Tell him or her if you are allergic to any medicine  Keep a list of the medicines, vitamins, and herbs you take  Include the amounts, and when and why you take them  Bring the list or the pill bottles to follow-up visits  Carry your medicine list with you in case of an emergency  Activity:   · Wear an abdominal binder as directed  An abdominal binder will decrease pain when you move or cough  · Rest as needed  Get up and move around as directed to help prevent blood clots  Start with short walks and slowly increase the distance every day  Limit the number of times you climb stairs to 2 times each day  Plan most of your daily activities on one level of your home  · Do not lift objects heavier than 10 pounds for 6 weeks  Avoid strenuous activity for 2 weeks  · Do not strain during bowel movements  High-fiber foods and extra liquids can help you prevent constipation  Examples of high-fiber foods are fruit and bran  Prune juice and water are good liquids to drink  · Do not have sex, use tampons, or douche for up to 8 weeks  Ask your healthcare provider if it is okay to take a tub bath  · Do not go in pools or hot tubs for 6 weeks or as directed  · Ask when it is safe for you to drive, return to work, and return to other regular activities  Wound care: If you have abdominal incisions, care for them as directed  Carefully wash around the wound with soap and water  It is okay to let the soap and water run over your incision  Do not  scrub your incision  Dry the area and put on new, clean bandages as directed  Change your bandages when they get wet or dirty  If you have strips of medical tape, let them fall off on their own  It may take 7 to 14 days for them to fall off  Check your incision every day for redness, swelling, or pus  Deep breathing:  Take deep breaths and cough 10 times each hour  This will decrease your risk for a lung infection  Take a deep breath and hold it for as long as you can  Let the air out and then cough strongly  Deep breaths help open your airway  You may be given an incentive spirometer to help you take deep breaths  Put the plastic piece in your mouth and take a slow, deep breath, then let the air out and cough  Repeat these steps 10 times every hour  Get support: This surgery may be life-changing for you and your family  You will no longer be able to get pregnant  Sudden changes in the levels of your hormones may occur and cause mood swings and depression   You may feel angry, sad, or frightened, or cry frequently and unexpectedly  These feelings are normal  Talk to your healthcare provider about where you can get support  You can also ask if hormone replacement medicine is right for you  Follow up with your healthcare provider or gynecologist as directed: You may need to return to have stitches removed, and for other tests  Write down your questions so you remember to ask them during your visits  © Copyright [employer] 2021 Information is for End User's use only and may not be sold, redistributed or otherwise used for commercial purposes  All illustrations and images included in CareNotes® are the copyrighted property of [facility]  or [facility] Katelin Ravi   The above information is an  only  It is not intended as medical advice for individual conditions or treatments  Talk to your doctor, nurse or pharmacist before following any medical regimen to see if it is safe and effective for you

## 2022-02-09 NOTE — ANESTHESIA POSTPROCEDURE EVALUATION
Post-Op Assessment Note    CV Status:  Stable  Pain Score: 2    Pain management: adequate     Mental Status:  Alert and awake   Hydration Status:  Euvolemic   PONV Controlled:  Controlled   Airway Patency:  Patent      Post Op Vitals Reviewed: Yes      Staff: Anesthesiologist         No complications documented      /59 (02/09/22 1348)    Temp     Pulse 98 (02/09/22 1348)   Resp 14 (02/09/22 1348)    SpO2 93 % (02/09/22 1348)

## 2022-02-09 NOTE — ANESTHESIA PREPROCEDURE EVALUATION
Procedure:  HYSTERECTOMY TOTAL ABDOMINAL (ROMÁN); B/L SALPINGO-OOPHORECTOMY (N/A Abdomen)    Relevant Problems   CARDIO   (+) Atherosclerotic heart disease of native coronary artery without angina pectoris   (+) Benign essential hypertension   (+) Left bundle branch block (LBBB)   (+) Presence of drug coated stent in right coronary artery      ENDO   (+) Type 2 diabetes mellitus without complication, without long-term current use of insulin (HCC)      MUSCULOSKELETAL   (+) Acute bilateral low back pain with bilateral sciatica      NEURO/PSYCH   (+) Anxiety      Other   (+) Body mass index (BMI) 36 0-36 9, adult   (+) Tobacco abuse        Physical Exam    Airway  Comment: Large tonsils  Mallampati score: II  TM Distance: >3 FB  Neck ROM: full     Dental       Cardiovascular  Rhythm: regular, Rate: normal, Cardiovascular exam normal    Pulmonary  Pulmonary exam normal Decreased breath sounds,     Other Findings        Anesthesia Plan  ASA Score- 3     Anesthesia Type- general with ASA Monitors  Additional Monitors:   Airway Plan:           Plan Factors-    Chart reviewed  EKG reviewed  Existing labs reviewed  Patient summary reviewed  Patient is a current smoker  Patient instructed to abstain from smoking on day of procedure  Patient did not smoke on day of surgery  Induction- intravenous  Postoperative Plan- Plan for postoperative opioid use  Planned trial extubation    Informed Consent- Anesthetic plan and risks discussed with patient and sibling

## 2022-02-09 NOTE — OP NOTE
PERATIVE REPORT  PATIENT NAME: Cabrera Bailey    :  1960  MRN: [id number]  Pt Location: [facility] OR ROOM 02    SURGERY DATE: 2022    Surgeon(s) and Role:     * Enrique Pascal MD - Primary     * Mely Baltazar MD - Assisting     * Mady Rosales MD - Assisting    Preop Diagnosis:  Postmenopausal bleeding [N95 0]  Pelvic mass [R19 00]    Post-Op Diagnosis Codes:     * Postmenopausal bleeding [N95 0]     * Pelvic mass [R19 00]    Procedure(s) (LRB):  HYSTERECTOMY TOTAL ABDOMINAL (ROMÁN); B/L SALPINGO-OOPHORECTOMY, CYSTOSCOPY (N/A)    Specimen(s):  ID Type Source Tests Collected by Time Destination   1 : left ovary/ovarian cyst/left fallopian tube Tissue Ovary, Left TISSUE EXAM Enrique Pascal MD 2022 1101    2 : uterus, cervix, right fallopian tubes, right ovary, fibroids Tissue Uterus TISSUE EXAM Enrique Pascal MD 2022 1105        Estimated Blood Loss:   650 mL    Drains:  Urethral Catheter Non-latex 16 Fr  (Active)   Reasons to continue Urinary Catheter  Post-operative urological requirements 22 1333   Goal for Removal Remove POD#1 22 1333   Site Assessment Clean;Skin intact 22 1333   Collection Container Standard drainage bag 22 1333   Securement Method Securing device (Describe) 22 1333   Number of days: 0       Anesthesia Type:   General endotracheal    Operative Indications:  Postmenopausal bleeding [N95 0]  Pelvic mass [R19 00]      Operative Findings:  1  External genitalia normal, no mass and lesion appreciated  2  EUA: Cervix multipara, retracted to abdomen, hard evalaute completely  There is a globally distended  noted to be posterior culde sac  Uterus 20 weeks in size, fixed and hard  Bilateral adnexa unable to assess  3  Laparotomy:  Uterus displaced anteriorly, there was noted to be 24 weeks in size globally enlarged fibroid posterior to uterus  Right ovary and bilateral fallopian tubes normal  There was a hemorrhagic mass adjacent to left ovary   Grossly bowel, small intestine and bladder normal    4  Cystoscopy: Bladder scan 360' and no laceration or suture material appreciated, bladder dome and trigone intact  Bilateral ureteral orifice visualized, bilateral jets observed  Complications:   None    Procedure and Technique:  After informed consent was obtained, the patient was taken to the operating room where general endotracheal anesthesia was then administered without incident  A full time out procedure was performed  The patient was prepped and draped in normal sterile fashion in the supine position  The patient was froglegged and a chlorhexidine vaginal prep was performed then a Nair catheter was inserted  A midline vertical incision was created with a knife and carried through to the fascia with a Bovie electrocautery device  This was taken down to the underlying layer of fascia with cautery  The fascia was opened the midline  The fascial incision extended superiorly and anteriorly  The peritoneum was identified and entered  The peritoneal incision extended superiorly and inferiorly as well  Any adhesions were taken down  A Bookwalter self retaining retractor was placed  The uterus was displaced to anterior and cervix was retracted with vagina to low pelvic grim  The uterine ligament and vasculature sealed, clamped and cut  The round ligament was transected bilaterally  The infundibulopelvic ligament on the right side was skeletonized, clamped and sealed with Enseal device and  Transected  The left ovary removed and sent for Frozen pathology  Hemostasis was assured  This procedure was then repeated on the left side  The infundibulum pelvic ligament on the left side was skeletonized, sealed and transected via Enseal device  Hemostasis was assured  The vesicovaginal space was then opened using cautery  The bladder was taken down below the level of the external os of the cervix  The uterine vessels were skeletonized bilaterally   They were clamped, sealed and transected via Enseal device  At this point attention turn to fibroid approximate size of 24 week size uterus which was posterior to uterus  The cardinal ligaments, posterior uterine serosa and and anteriorly bladder dissected off from the fibroid and fibroid removed as one piece  The remaining cardinal ligaments were taken down, clamped with straight Zeppelin clamps transected and secured with 0 Vicryl suture until the level of the external os of the cervix was reached  Right angle Zeppelin clamps were used to come across the upper portion of the vagina and uterosacral ligaments  The specimen was then removed  The vaginal apex was then secured using interrupted figure-of-eight 0 Vicryl suture with excellent hemostasis noted  The pelvis was irrigated  There is no evidence of bleeding noted  The fibroid capsule dissected off from the posterior cul-de-sac and uterine serosa  the remainder peritoneal peritoneum reappointed with vaginal cuff posteriorly with vicryl 3 0  The operating staff was regloved and gowned  The wound was redratped and a separate closure tray was brought into the field  The fascia was closed using #1 looped PDS in a running Smead-Solorzano fashion  The skin was closed with stratafix and histocryl  A sterile dressing was applied  The patient was then awakened and transferred to the recovery room in stable condition  All instrument and instrument counts were correct X 2 for the procedure  No complications were noted  I was present for the entire procedure     I was present for the entire procedure    Patient Disposition:  PACU  and extubated and stable      SIGNATURE: Iftikhar Lemons MD  DATE: February 9, 2022  TIME: 2:28 PM

## 2022-02-10 PROBLEM — Z98.890 POSTOPERATIVE STATE: Status: ACTIVE | Noted: 2022-02-10

## 2022-02-10 PROBLEM — D62 ACUTE BLOOD LOSS ANEMIA: Status: ACTIVE | Noted: 2022-02-10

## 2022-02-10 LAB
ANION GAP SERPL CALCULATED.3IONS-SCNC: 8 MMOL/L (ref 4–13)
BUN SERPL-MCNC: 9 MG/DL (ref 5–25)
CALCIUM SERPL-MCNC: 7.8 MG/DL (ref 8.3–10.1)
CHLORIDE SERPL-SCNC: 107 MMOL/L (ref 100–108)
CO2 SERPL-SCNC: 23 MMOL/L (ref 21–32)
CREAT SERPL-MCNC: 0.9 MG/DL (ref 0.6–1.3)
ERYTHROCYTE [DISTWIDTH] IN BLOOD BY AUTOMATED COUNT: 15.8 % (ref 11.6–15.1)
GFR SERPL CREATININE-BSD FRML MDRD: 69 ML/MIN/1.73SQ M
GLUCOSE SERPL-MCNC: 120 MG/DL (ref 65–140)
GLUCOSE SERPL-MCNC: 134 MG/DL (ref 65–140)
GLUCOSE SERPL-MCNC: 158 MG/DL (ref 65–140)
GLUCOSE SERPL-MCNC: 158 MG/DL (ref 65–140)
GLUCOSE SERPL-MCNC: 167 MG/DL (ref 65–140)
GLUCOSE SERPL-MCNC: 173 MG/DL (ref 65–140)
GLUCOSE SERPL-MCNC: 185 MG/DL (ref 65–140)
GLUCOSE SERPL-MCNC: 193 MG/DL (ref 65–140)
GLUCOSE SERPL-MCNC: 246 MG/DL (ref 65–140)
HCT VFR BLD AUTO: 36 % (ref 34.8–46.1)
HGB BLD-MCNC: 11 G/DL (ref 11.5–15.4)
MCH RBC QN AUTO: 27.3 PG (ref 26.8–34.3)
MCHC RBC AUTO-ENTMCNC: 30.6 G/DL (ref 31.4–37.4)
MCV RBC AUTO: 89 FL (ref 82–98)
PLATELET # BLD AUTO: 236 THOUSANDS/UL (ref 149–390)
PMV BLD AUTO: 9.8 FL (ref 8.9–12.7)
POTASSIUM SERPL-SCNC: 4 MMOL/L (ref 3.5–5.3)
RBC # BLD AUTO: 4.03 MILLION/UL (ref 3.81–5.12)
SODIUM SERPL-SCNC: 138 MMOL/L (ref 136–145)
WBC # BLD AUTO: 16.32 THOUSAND/UL (ref 4.31–10.16)

## 2022-02-10 PROCEDURE — 99254 IP/OBS CNSLTJ NEW/EST MOD 60: CPT | Performed by: INTERNAL MEDICINE

## 2022-02-10 PROCEDURE — 82948 REAGENT STRIP/BLOOD GLUCOSE: CPT

## 2022-02-10 PROCEDURE — 85027 COMPLETE CBC AUTOMATED: CPT | Performed by: OBSTETRICS & GYNECOLOGY

## 2022-02-10 PROCEDURE — 80048 BASIC METABOLIC PNL TOTAL CA: CPT | Performed by: PHYSICIAN ASSISTANT

## 2022-02-10 PROCEDURE — 99024 POSTOP FOLLOW-UP VISIT: CPT | Performed by: OBSTETRICS & GYNECOLOGY

## 2022-02-10 RX ORDER — SIMETHICONE 80 MG
80 TABLET,CHEWABLE ORAL EVERY 6 HOURS PRN
Status: DISCONTINUED | OUTPATIENT
Start: 2022-02-10 | End: 2022-02-11 | Stop reason: HOSPADM

## 2022-02-10 RX ORDER — OXYCODONE HYDROCHLORIDE 5 MG/1
5 TABLET ORAL EVERY 4 HOURS PRN
Status: DISCONTINUED | OUTPATIENT
Start: 2022-02-10 | End: 2022-02-10 | Stop reason: SDUPTHER

## 2022-02-10 RX ORDER — OXYCODONE HYDROCHLORIDE 10 MG/1
10 TABLET ORAL EVERY 4 HOURS PRN
Status: DISCONTINUED | OUTPATIENT
Start: 2022-02-10 | End: 2022-02-10 | Stop reason: SDUPTHER

## 2022-02-10 RX ADMIN — INSULIN LISPRO 1 UNITS: 100 INJECTION, SOLUTION INTRAVENOUS; SUBCUTANEOUS at 05:49

## 2022-02-10 RX ADMIN — SITAGLIPTIN 100 MG: 100 TABLET, FILM COATED ORAL at 08:25

## 2022-02-10 RX ADMIN — INSULIN LISPRO 2 UNITS: 100 INJECTION, SOLUTION INTRAVENOUS; SUBCUTANEOUS at 12:02

## 2022-02-10 RX ADMIN — GLIPIZIDE 5 MG: 5 TABLET ORAL at 08:25

## 2022-02-10 RX ADMIN — CARVEDILOL 12.5 MG: 6.25 TABLET, FILM COATED ORAL at 08:25

## 2022-02-10 RX ADMIN — ACETAMINOPHEN 975 MG: 325 TABLET, FILM COATED ORAL at 23:01

## 2022-02-10 RX ADMIN — DOCUSATE SODIUM 100 MG: 100 CAPSULE ORAL at 17:00

## 2022-02-10 RX ADMIN — OXYCODONE HYDROCHLORIDE 10 MG: 10 TABLET ORAL at 10:37

## 2022-02-10 RX ADMIN — METFORMIN HYDROCHLORIDE 1000 MG: 500 TABLET ORAL at 16:59

## 2022-02-10 RX ADMIN — CARVEDILOL 12.5 MG: 6.25 TABLET, FILM COATED ORAL at 16:59

## 2022-02-10 RX ADMIN — ACETAMINOPHEN 975 MG: 325 TABLET, FILM COATED ORAL at 12:02

## 2022-02-10 RX ADMIN — METFORMIN HYDROCHLORIDE 1000 MG: 500 TABLET ORAL at 08:25

## 2022-02-10 RX ADMIN — INSULIN LISPRO 1 UNITS: 100 INJECTION, SOLUTION INTRAVENOUS; SUBCUTANEOUS at 18:28

## 2022-02-10 RX ADMIN — SODIUM CHLORIDE 125 ML/HR: 0.9 INJECTION, SOLUTION INTRAVENOUS at 05:49

## 2022-02-10 RX ADMIN — INSULIN LISPRO 1 UNITS: 100 INJECTION, SOLUTION INTRAVENOUS; SUBCUTANEOUS at 13:10

## 2022-02-10 RX ADMIN — ENOXAPARIN SODIUM 40 MG: 40 INJECTION SUBCUTANEOUS at 08:25

## 2022-02-10 RX ADMIN — ACETAMINOPHEN 975 MG: 325 TABLET, FILM COATED ORAL at 17:00

## 2022-02-10 RX ADMIN — DOCUSATE SODIUM 100 MG: 100 CAPSULE ORAL at 08:25

## 2022-02-10 RX ADMIN — OXYCODONE HYDROCHLORIDE 5 MG: 5 TABLET ORAL at 23:03

## 2022-02-10 NOTE — UTILIZATION REVIEW
Initial Clinical Review    Elective inpatient surgical procedure  Age/Sex: 64 y o  female  Surgery Date: 2/9/22  Procedure: HYSTERECTOMY TOTAL ABDOMINAL (ROMÁN); B/L SALPINGO-OOPHORECTOMY, CYSTOSCOPY  Anesthesia: General   Operative Findings:   Operative Findings:  1  External genitalia normal, no mass and lesion appreciated  2  EUA: Cervix multipara, retracted to abdomen, hard evalaute completely  There is a globally distended  noted to be posterior culde sac  Uterus 20 weeks in size, fixed and hard  Bilateral adnexa unable to assess  3  Laparotomy:  Uterus displaced anteriorly, there was noted to be 24 weeks in size globally enlarged fibroid posterior to uterus  Right ovary and bilateral fallopian tubes normal  There was a hemorrhagic mass adjacent to left ovary  Grossly bowel, small intestine and bladder normal   4  Cystoscopy: Bladder scan 360' and no laceration or suture material appreciated, bladder dome and trigone intact  Bilateral ureteral orifice visualized, bilateral jets observed  POD#1 Progress Note: S/P ROMÁN, BSO, cysto for fibroid uterus (1 8kg)  vertical incision clean dry and intact, lees d/c this am, DTV pending,  post op hyperglycemia management with home regimen and SSI  Nephrology Consult: NHAN periop risk reduction: isolated GFR: creatinine 1/07/GFR56 on isolated pre op labs  baseline creatinine 0 7-1 0 with all GFRs>60  Postoperative creat 0 90/GFR 69, UA 12/5/21:  500 glucose, trace protein, 1 -2 RBC  Microalbumin/creatinine ratio 130 11/4/2019  Continue to follow outpatient, renal function stable and no evidence of NHAN post op, adequate urine output  Avoid nephrotoxins and hypotension, avoid NSAIDs,  Avoid further Toradol dosing  Restart losartan at hospital discharge       Admission Orders: Date/Time/Statement:   Admission Orders (From admission, onward)     Ordered        02/09/22 0933  Inpatient Admission  Once                      Orders Placed This Encounter   Procedures    Inpatient Admission     Standing Status:   Standing     Number of Occurrences:   1     Order Specific Question:   Level of Care     Answer:   Med Surg [16]     Order Specific Question:   Estimated length of stay     Answer:   More than 2 Midnights     Order Specific Question:   Certification     Answer:   I certify that inpatient services are medically necessary for this patient for a duration of greater than two midnights  See H&P and MD Progress Notes for additional information about the patient's course of treatment  Vital Signs: /69   Pulse 97   Temp 97 9 °F (36 6 °C)   Resp 18   Ht 5' 4" (1 626 m)   Wt 95 1 kg (209 lb 10 5 oz)   SpO2 91%   Breastfeeding Unknown Comment: 6 YRS LAST MENES   BMI 35 99 kg/m²     Pertinent Labs/Diagnostic Test Results:   Results from last 7 days   Lab Units 02/09/22  0809   SARS-COV-2  Negative     Results from last 7 days   Lab Units 02/10/22  0442 02/09/22  0803   WBC Thousand/uL 16 32* 14 50*   HEMOGLOBIN g/dL 11 0* 14 0   HEMATOCRIT % 36 0 43 4   PLATELETS Thousands/uL 236 262   NEUTROS ABS Thousands/µL  --  9 61*         Results from last 7 days   Lab Units 02/10/22  0442   SODIUM mmol/L 138   POTASSIUM mmol/L 4 0   CHLORIDE mmol/L 107   CO2 mmol/L 23   ANION GAP mmol/L 8   BUN mg/dL 9   CREATININE mg/dL 0 90   EGFR ml/min/1 73sq m 69   CALCIUM mg/dL 7 8*         Results from last 7 days   Lab Units 02/10/22  0655 02/10/22  0544 02/09/22  2359 02/09/22  2057 02/09/22  1619 02/09/22  1442 02/09/22  1357 02/09/22  0829   POC GLUCOSE mg/dl 158* 167* 246* 286* 205* 261* 264* 183*     Results from last 7 days   Lab Units 02/10/22  0442   GLUCOSE RANDOM mg/dL 185*       Results from last 7 days   Lab Units 02/09/22  0809   INFLUENZA A PCR  Negative   INFLUENZA B PCR  Negative   RSV PCR  Negative     Diet: diabetic diet  Mobility: oob as tolerated  DVT Prophylaxis: scd's lovenox sq OD,     Medications/Pain Control: Dilaudid PCA and Tylenol for pain   Will transition to all oral analgesics today  Scheduled Medications:  acetaminophen, 975 mg, Oral, Q6H ANALY  atorvastatin, 80 mg, Oral, Daily With Dinner  carvedilol, 12 5 mg, Oral, BID With Meals  docusate sodium, 100 mg, Oral, BID  enoxaparin, 40 mg, Subcutaneous, Q24H ANALY  glipiZIDE, 5 mg, Oral, Daily Before Breakfast  insulin glargine, 10 Units, Subcutaneous, HS  insulin lispro, 1-6 Units, Subcutaneous, Q6H ANALY  metFORMIN, 1,000 mg, Oral, BID With Meals  sitaGLIPtin, 100 mg, Oral, Daily      Continuous IV Infusions: none     PRN Meds:  diphenhydrAMINE, 25 mg, Intravenous, Q6H PRN  HYDROmorphone, 0 5 mg, Intravenous, Q1H PRN 2/9 x2,   naloxone, 0 04 mg, Intravenous, Q3 min PRN  ondansetron, 4 mg, Intravenous, Q6H PRN 2/9 x1   oxyCODONE, 10 mg, Oral, Q4H PRN  oxyCODONE, 5 mg, Oral, Q4H PRN  simethicone, 80 mg, Oral, Q6H PRN        Network Utilization Review Department  ATTENTION: Please call with any questions or concerns to [phone] and carefully listen to the prompts so that you are directed to the right person  All voicemails are confidential   Winnie Mcintyre all requests for admission clinical reviews, approved or denied determinations and any other requests to dedicated fax number below belonging to the campus where the patient is receiving treatment   List of dedicated fax numbers for the Facilities:  [address] DENIALS (Administrative/Medical Necessity) [phone]   [address] (Maternity/NICU/Pediatrics) [phone]   [address] [place] [address]  [address] [address] [place], [place] [address] [place] [address] [address] [place] [address] [phone] Josee Welch [address] [place] [address] [phone]

## 2022-02-10 NOTE — UTILIZATION REVIEW
Inpatient Admission Authorization Request   NOTIFICATION OF INPATIENT ADMISSION/INPATIENT AUTHORIZATION REQUEST   SERVICING FACILITY:   [address] [place] [address], [place] [address] [facility]  Tax ID: [id number]  NPI: [id number]  Place of [facility]patient [facility]  Place of Service Code: 24     ATTENDING PROVIDER:  Attending Name and NPI#: Jose G Ochoa Md [[id number]]  Address: [address] [place] [address], [place] [address] [address] [facility]  Phone: [phone]     UTILIZATION REVIEW CONTACT:  Christine Hoyos Utilization   Network Utilization Review Department  Phone: [phone]  Fax: [phone]  Email: [email or web address]     PHYSICIAN ADVISORY SERVICES:  FOR UUQE-FB-RMRO REVIEW - MEDICAL NECESSITY DENIAL  Phone: [phone]  Fax: [phone]  Email: [email or web address]     TYPE OF REQUEST:  Inpatient Status     ADMISSION INFORMATION:  ADMISSION DATE/TIME: 2/9/22  9:47 AM  PATIENT DIAGNOSIS CODE/DESCRIPTION:  Postmenopausal bleeding [N95 0]  Pelvic mass [R19 00]  DISCHARGE DATE/TIME: No discharge date for patient encounter  DISCHARGE DISPOSITION (IF DISCHARGED): Home/Self Care     IMPORTANT INFORMATION:  Please contact the [facility] directly with any questions or concerns regarding this request  Department voicemails are confidential     Send requests for admission clinical reviews, concurrent reviews, approvals, and administrative denials due to lack of clinical to fax [phone]

## 2022-02-10 NOTE — PLAN OF CARE
Problem: MOBILITY - ADULT  Goal: Maintain or return to baseline ADL function  Description: INTERVENTIONS:  -  Assess patient's ability to carry out ADLs; assess patient's baseline for ADL function and identify physical deficits which impact ability to perform ADLs (bathing, care of mouth/teeth, toileting, grooming, dressing, etc )  - Assess/evaluate cause of self-care deficits   - Assess range of motion  - Assess patient's mobility; develop plan if impaired  - Assess patient's need for assistive devices and provide as appropriate  - Encourage maximum independence but intervene and supervise when necessary  - Involve family in performance of ADLs  - Assess for home care needs following discharge   - Consider OT consult to assist with ADL evaluation and planning for discharge  - Provide patient education as appropriate  Outcome: Progressing  Goal: Maintains/Returns to pre admission functional level  Description: INTERVENTIONS:  - Perform BMAT or MOVE assessment daily    - Set and communicate daily mobility goal to care team and patient/family/caregiver  - Collaborate with rehabilitation services on mobility goals if consulted  - Perform Range of Motion 5 times a day  - Reposition patient every 2 hours  - Dangle patient 3 times a day  - Stand patient 3 times a day  - Ambulate patient 3 times a day  - Out of bed to chair 3 times a day   - Out of bed for meals 3 times a day  - Out of bed for toileting  - Record patient progress and toleration of activity level   Outcome: Progressing     Problem: Nutrition/Hydration-ADULT  Goal: Nutrient/Hydration intake appropriate for improving, restoring or maintaining nutritional needs  Description: Monitor and assess patient's nutrition/hydration status for malnutrition  Collaborate with interdisciplinary team and initiate plan and interventions as ordered  Monitor patient's weight and dietary intake as ordered or per policy   Utilize nutrition screening tool and intervene as necessary  Determine patient's food preferences and provide high-protein, high-caloric foods as appropriate       INTERVENTIONS:  - Monitor oral intake, urinary output, labs, and treatment plans  - Assess nutrition and hydration status and recommend course of action  - Evaluate amount of meals eaten  - Assist patient with eating if necessary   - Allow adequate time for meals  - Recommend/ encourage appropriate diets, oral nutritional supplements, and vitamin/mineral supplements  - Order, calculate, and assess calorie counts as needed  - Recommend, monitor, and adjust tube feedings and TPN/PPN based on assessed needs  - Assess need for intravenous fluids  - Provide specific nutrition/hydration education as appropriate  - Include patient/family/caregiver in decisions related to nutrition  Outcome: Progressing     Problem: PAIN - ADULT  Goal: Verbalizes/displays adequate comfort level or baseline comfort level  Description: Interventions:  - Encourage patient to monitor pain and request assistance  - Assess pain using appropriate pain scale  - Administer analgesics based on type and severity of pain and evaluate response  - Implement non-pharmacological measures as appropriate and evaluate response  - Consider cultural and social influences on pain and pain management  - Notify physician/advanced practitioner if interventions unsuccessful or patient reports new pain  Outcome: Progressing     Problem: INFECTION - ADULT  Goal: Absence or prevention of progression during hospitalization  Description: INTERVENTIONS:  - Assess and monitor for signs and symptoms of infection  - Monitor lab/diagnostic results  - Monitor all insertion sites, i e  indwelling lines, tubes, and drains  - Monitor endotracheal if appropriate and nasal secretions for changes in amount and color  - [place] appropriate cooling/warming therapies per order  - Administer medications as ordered  - Instruct and encourage patient and family to use good hand hygiene technique  - Identify and instruct in appropriate isolation precautions for identified infection/condition  Outcome: Progressing  Goal: Absence of fever/infection during neutropenic period  Description: INTERVENTIONS:  - Monitor WBC    Outcome: Progressing     Problem: DISCHARGE PLANNING  Goal: Discharge to home or other facility with appropriate resources  Description: INTERVENTIONS:  - Identify barriers to discharge w/patient and caregiver  - Arrange for needed discharge resources and transportation as appropriate  - Identify discharge learning needs (meds, wound care, etc )  - Arrange for interpretive services to assist at discharge as needed  - Refer to Case Management Department for coordinating discharge planning if the patient needs post-hospital services based on physician/advanced practitioner order or complex needs related to functional status, cognitive ability, or social support system  Outcome: Progressing     Problem: Knowledge Deficit  Goal: Patient/family/caregiver demonstrates understanding of disease process, treatment plan, medications, and discharge instructions  Description: Complete learning assessment and assess knowledge base    Interventions:  - Provide teaching at level of understanding  - Provide teaching via preferred learning methods  Outcome: Progressing     Problem: GENITOURINARY - ADULT  Goal: Maintains or returns to baseline urinary function  Description: INTERVENTIONS:  - Assess urinary function  - Encourage oral fluids to ensure adequate hydration if ordered  - Administer IV fluids as ordered to ensure adequate hydration  - Administer ordered medications as needed  - Offer frequent toileting  - Follow urinary retention protocol if ordered  Outcome: Progressing  Goal: Absence of urinary retention  Description: INTERVENTIONS:  - Assess patients ability to void and empty bladder  - Monitor I/O  - Bladder scan as needed  - Discuss with physician/AP medications to alleviate retention as needed  - Discuss catheterization for long term situations as appropriate  Outcome: Progressing  Goal: Urinary catheter remains patent  Description: INTERVENTIONS:  - Assess patency of urinary catheter  - If patient has a chronic lees, consider changing catheter if non-functioning  - Follow guidelines for intermittent irrigation of non-functioning urinary catheter  Outcome: Progressing     Problem: Potential for Falls  Goal: Patient will remain free of falls  Description: INTERVENTIONS:  - Educate patient/family on patient safety including physical limitations  - Instruct patient to call for assistance with activity   - Consult OT/PT to assist with strengthening/mobility   - Keep Call bell within reach  - Keep bed low and locked with side rails adjusted as appropriate  - Keep care items and personal belongings within reach  - Initiate and maintain comfort rounds  - Make Fall Risk Sign visible to staff  - Offer Toileting every 2 Hours, in advance of need  - Initiate/Maintain bed alarm  - Obtain necessary fall risk management equipment:   - Apply yellow socks and bracelet for high fall risk patients  - Consider moving patient to room near nurses station  Outcome: Progressing

## 2022-02-10 NOTE — CONSULTS
Consultation - Nephrology   Johanny Palencia 64 y o  female MRN: [id number]  Unit/Bed#: E5 -01 Encounter: [id number]    ASSESSMENT/PLAN:     1  NHAN perioperative risk reduction: isolated decreased GFR  -creat 1 07/GFR 56 on isolated preop labs  EMR reviewed with baseline creatinine 0 7-1 0 with all GFRs>60  Postoperative creat 0 90/GFR 69  -UA 12/5/21:  500 glucose, trace protein, 1 -2 RBC  Microalbumin/creatinine ratio 130 11/4/2019  Continue to follow outpatient  -renal function stable and no evidence of NHAN  -no intraoperative or postoperative hypotension  -adequate urine output  -recommend holding losartan  Restart losartan at hospital discharge  -avoid NSAIDs  Avoid further Toradol dosing   -avoid hypotension  -avoid nephrotoxins, IV contrast if possible  -will sign off and see patient as needed  Please call with further concerns    2  Uterine Fibroid:  -s/p ROMÁN-BSO, cystoscopy 2/9/22  -intraoperative FF: benign  -await final pathology  -postoperative management follow-up per primary gyn service    3  HTN:  -BP well controlled and adequate despite holding ARB  -home medications:  Losartan 100 mg daily, Coreg 12 5 mg b i d   -current medications:  Coreg 12 5 mg b i d   -continue to hold losartan  Recommend restarting on hospital discharge    4  DM2:  -A1C 7 2  -continue to optimize DM management for prevention diabetic nephropathy  -management per primary medical service and PCP    HISTORY OF PRESENT ILLNESS:  Requesting Physician: Ole Vance MD  Reason for Consult:  Perioperative NHAN risk reduction protocol    Johanny Palencia is a 64y o  year old female HTN, HLD, CAD, s/p PCI/[place] chronic LBBB, DM2 and uterine fibroids who was admitted to [address] [place] [address] for an elective ROMÁN-BSO and cystoscopy 2/9/22  Patient with creat 1 07/GFR 56 on preop labs 1/27/22 and nephrology consult per protocol for perioperative NHAN risk reduction  Patient with no prior history of NHAN, CKD or renal failure    Baseline creatinine 0 7-1 0 with GFR >60  Patient denies NSAID use, urinary retention, dysuria, pyuria, foamy urine  S/p 1 dose of Toradol 30 mg IV x 1 in PACU but no further NSAID doses  Patient on losartan at home which was held on day of surgery  No episodes of intraoperative sustained hypotension and MAP maintained >65  No postoperative hypotension and  ml/hr continued until this am   Adequate urine output via lees, 2100 ml since OR  PAST MEDICAL HISTORY:  Past Medical History:   Diagnosis Date    Cardiac disease     MI May 25, 2013    Chronic neck pain     Last Assessed:11/28/16    Diabetes mellitus ([facility] )     Hyperlipidemia     Hypertension     Left bundle branch block     Last Assessed:12/5/13    [facility]     Last Assessed:11/28/16    Tonsillar hypertrophy     Last Assessed:8/27/13       PAST SURGICAL HISTORY:  Past Surgical History:   Procedure Laterality Date    CARDIAC SURGERY      1 stent placed    CORONARY ANGIOPLASTY WITH STENT PLACEMENT  05/28/2013    per [employer]: Cath stent 1 type drug-eluting, mild RCA    [place] TOTAL ABDOM HYSTERECTOMY N/A 2/9/2022    Procedure: HYSTERECTOMY TOTAL ABDOMINAL (ROMÁN); B/L SALPINGO-OOPHORECTOMY, CYSTOSCOPY;  Surgeon: Michael Lozada MD;  Location: [facility];  Service: Obstetrics       ALLERGIES:  Allergies   Allergen Reactions    Hctz [Hydrochlorothiazide] Shortness Of Breath and Dizziness    Wellbutrin [Bupropion] Shortness Of Breath     Category: Adverse Reaction;     Ace Inhibitors Cough       SOCIAL HISTORY:  Social History     Substance and Sexual Activity   Alcohol Use No     Social History     Substance and Sexual Activity   Drug Use No    Comment: occassionally      Social History     Tobacco Use   Smoking Status Current Every Day Smoker    Packs/day: 0 50    Years: 20 00    Pack years: 10 00    Types: Cigarettes   Smokeless Tobacco Never Used   Tobacco Comment    cutting back  education given   last smoked 2/8 2300       FAMILY HISTORY:  Family History   Problem Relation Age of Onset    Arthritis Mother     Colon cancer Mother 66    Coronary artery disease Mother     Fibrocystic breast disease Mother     Hypertension Mother     Cardiomyopathy Mother         Ischemic Dilated Cardiomyopathy    Arthritis Father     Kidney disease Father         Chronic (NKF classification)    Coronary artery disease Father     Gout Father     Prostate cancer Father     Hyperlipidemia Father         Pure Hypercholesterolemia    Stroke Father         Stroke Syndrome    Diabetes type II Father     Hypertension Sister     Hypothyroidism Sister     Other Sister         IVP- Solitary Kidney Calculus    Rheum arthritis Sister     Alcohol abuse Brother     Bipolar disorder Brother         NOS    Other Brother         IVP- Solitary Kidney Calculus    Fracnois's disease Brother     Cerebral aneurysm Brother         Ruptured    Breast cancer Maternal Aunt     Peripheral vascular disease Maternal Aunt     Breast cancer Cousin         2 maternal cousins    Lung cancer Maternal Uncle     Diabetes type II Maternal Uncle     Thrombocytopenia Daughter         Idiopathic Thrombocytopenia [place] at 10 yo    Fibroids Paternal Grandmother        MEDICATIONS:  Scheduled Meds:  Current Facility-Administered Medications   Medication Dose Route Frequency Provider Last Rate    acetaminophen  975 mg Oral Q6H [address] Elena Palmer MD      atorvastatin  80 mg Oral Daily With Jessica Russell MD      carvedilol  12 5 mg Oral BID With Meals Elena Palmer MD      diphenhydrAMINE  25 mg Intravenous Q6H PRN Elena Palmer MD      docusate sodium  100 mg Oral BID Elena Palmer MD      enoxaparin  40 mg Subcutaneous Q24H [address] Carie Foley MD      glipiZIDE  5 mg Oral Daily Before Breakfast Bruno Geiger DO      HYDROmorphone  0 5 mg Intravenous Q1H PRN Elena Palmer MD      insulin glargine  10 Units Subcutaneous  Hardy Nelida Graf MD      insulin lispro  1-6 Units Subcutaneous Q6H [facility] Xavier Cline MD      metFORMIN  1,000 mg Oral BID With Meals Balbina Bueno DO      naloxone  0 04 mg Intravenous Q3 min PRN Cari Dominguez MD      ondansetron  4 mg Intravenous Q6H PRN Cari Dominguez MD      oxyCODONE  10 mg Oral Q4H PRN Cari Dominguez MD      oxyCODONE  5 mg Oral Q4H PRN Cari Dominguez MD      simethicone  80 mg Oral Q6H PRN Yamile Hyatt MD      sitaGLIPtin  100 mg Oral Daily Balbina Bueno DO         PRN Meds: diphenhydrAMINE    HYDROmorphone    naloxone    ondansetron    oxyCODONE    oxyCODONE    simethicone    Continuous Infusions:  ml/hr x 7 hrs intraop followed by  mL/hr postop, discontinued at 7:30 a m  Today    REVIEW OF SYSTEMS:  A complete 12 system review of systems was done  Pertinent positives and negatives noted in the HPI but otherwise the review of systems is negative  PHYSICAL EXAM:  Current Weight: Weight - Scale: 95 1 kg (209 lb 10 5 oz)  First Weight: Weight - Scale: 95 1 kg (209 lb 10 5 oz)  Vitals:    02/10/22 0650   BP: 139/69   Pulse: 97   Resp: 18   Temp: 97 9 °F (36 6 °C)   SpO2: 91%       Intake/Output Summary (Last 24 hours) at 2/10/2022 0958  Last data filed at 2/10/2022 1889  Gross per 24 hour   Intake 5002 6 ml   Output 2775 ml   Net 2227 6 ml     General:  Awake, alert, appears comfortable and in no acute distress  Nontoxic  Skin:  No rash, warm, good skin turgor   Eyes:  PERRL, EOMI, sclerae nonicteric   no periorbital edema   ENT:  Moist mucous membranes  Neck:  Trachea midline, symmetric  No JVD  No Carotid bruits  Chest:  Clear to auscultation bilaterally without wheezes, crackles or rhonchi  CVS:  Regular rate and rhythm without murmur, gallop or rub  S1 and S2 identified and normal   No S3, S4    Abdomen:  Soft,  nondistended without masses  Normal bowel sounds x 4 quadrants  No bruit    Bilateral lower quadrant postoperative incisional tenderness as expected  No rebound tenderness  Extremities:  Warm, pink, motor and sensory intact and well perfused  No cyanosis, pallor  No BLE edema  Neuro:  Awake, alert, oriented x3  Grossly intact  Psych:  Appropriate affect  Mentating appropriately  Normal mental status exam  :  Nair catheter removed this a m  Shantel Venegasch Patient urge to urinate      Lab Results:   Results from last 7 days   Lab Units 02/10/22  0442 02/09/22  0803   WBC Thousand/uL 16 32* 14 50*   HEMOGLOBIN g/dL 11 0* 14 0   HEMATOCRIT % 36 0 43 4   PLATELETS Thousands/uL 236 262   SODIUM mmol/L 138  --    POTASSIUM mmol/L 4 0  --    CHLORIDE mmol/L 107  --    CO2 mmol/L 23  --    BUN mg/dL 9  --    CREATININE mg/dL 0 90  --    CALCIUM mg/dL 7 8*  --        Radiology Results:   No orders to display     EMR, including Epic and Care Everywhere, reviewed  I have personally reviewed the blood work as stated above and in my note  I have personally reviewed internal Medicine, co-consultants notes

## 2022-02-10 NOTE — ASSESSMENT & PLAN NOTE
-Pain controlled with scheduled Tylenol and Vandana PRN  -Encourage ambulation and incentive spirometer use  -DVT prophylaxis with Lovenox 40mg daily  -Voiding without issue  -FU final pathology

## 2022-02-10 NOTE — PLAN OF CARE
Problem: MOBILITY - ADULT  Goal: Maintain or return to baseline ADL function  Description: INTERVENTIONS:  -  Assess patient's ability to carry out ADLs; assess patient's baseline for ADL function and identify physical deficits which impact ability to perform ADLs (bathing, care of mouth/teeth, toileting, grooming, dressing, etc )  - Assess/evaluate cause of self-care deficits   - Assess range of motion  - Assess patient's mobility; develop plan if impaired  - Assess patient's need for assistive devices and provide as appropriate  - Encourage maximum independence but intervene and supervise when necessary  - Involve family in performance of ADLs  - Assess for home care needs following discharge   - Consider OT consult to assist with ADL evaluation and planning for discharge  - Provide patient education as appropriate  Outcome: Progressing  Goal: Maintains/Returns to pre admission functional level  Description: INTERVENTIONS:  - Perform BMAT or MOVE assessment daily    - Set and communicate daily mobility goal to care team and patient/family/caregiver     - Collaborate with rehabilitation services on mobility goals if consulted  - Out of bed for toileting  - Record patient progress and toleration of activity level   Outcome: Progressing     Problem: PAIN - ADULT  Goal: Verbalizes/displays adequate comfort level or baseline comfort level  Description: Interventions:  - Encourage patient to monitor pain and request assistance  - Assess pain using appropriate pain scale  - Administer analgesics based on type and severity of pain and evaluate response  - Implement non-pharmacological measures as appropriate and evaluate response  - Consider cultural and social influences on pain and pain management  - Notify physician/advanced practitioner if interventions unsuccessful or patient reports new pain  Outcome: Progressing     Problem: INFECTION - ADULT  Goal: Absence or prevention of progression during hospitalization  Description: INTERVENTIONS:  - Assess and monitor for signs and symptoms of infection  - Monitor lab/diagnostic results  - Monitor all insertion sites, i e  indwelling lines, tubes, and drains  - Monitor endotracheal if appropriate and nasal secretions for changes in amount and color  - [place] appropriate cooling/warming therapies per order  - Administer medications as ordered  - Instruct and encourage patient and family to use good hand hygiene technique  - Identify and instruct in appropriate isolation precautions for identified infection/condition  Outcome: Progressing  Goal: Absence of fever/infection during neutropenic period  Description: INTERVENTIONS:  - Monitor WBC    Outcome: Progressing     Problem: DISCHARGE PLANNING  Goal: Discharge to home or other facility with appropriate resources  Description: INTERVENTIONS:  - Identify barriers to discharge w/patient and caregiver  - Arrange for needed discharge resources and transportation as appropriate  - Identify discharge learning needs (meds, wound care, etc )  - Arrange for interpretive services to assist at discharge as needed  - Refer to Case Management Department for coordinating discharge planning if the patient needs post-hospital services based on physician/advanced practitioner order or complex needs related to functional status, cognitive ability, or social support system  Outcome: Progressing

## 2022-02-10 NOTE — ASSESSMENT & PLAN NOTE
Lab Results   Component Value Date    HGBA1C 7 2 (H) 01/27/2022       Recent Labs     02/10/22  1307 02/10/22  1601 02/10/22  1804 02/10/22  2048   POCGLU 158* 120 173* 134       Blood Sugar Average: Last 72 hrs:  (P) 196     -Diabetic diet and sliding scale insulin algorithm 3  -Home meds resumed: Januvia, Metformin, Glipizide

## 2022-02-10 NOTE — PROGRESS NOTES
Progress Note - OB/GYN   Coy Bis 64 y o  female MRN: [id number]  Unit/Bed#: E5 -01 Encounter: [id number]    Assessment/Plan:  64 y o  post-operative day 1 status post total abdominal hysterectomy, bilateral salpingo-oophorectomy and cystoscopy for fibroid uterus  By problem:     Postoperative state  Assessment & Plan  -Currently on Dilaudid PCA and Tylenol for pain  Will transition to all oral analgesics today  -Encourage ambulation and incentive spirometer use  -DVT prophylaxis with Lovenox 40mg daily  -Lees catheter removed this morning ->FU void trial  -FU final pathology    Type 2 diabetes mellitus without complication, without long-term current use of insulin [facility])  Assessment & Plan  Lab Results   Component Value Date    HGBA1C 7 2 (H) 01/27/2022       Recent Labs     02/09/22  1619 02/09/22  2057 02/09/22  2359 02/10/22  0544   POCGLU 205* 286* 246* 167*       Blood Sugar Average: Last 72 hrs:  (P) 230 [id number]     -Diabetic diet and sliding scale insulin algorithm 3  -Will resume home meds today: Januvia, Metformin, Glipizide      Benign essential hypertension  Assessment & Plan  -Continue home Carvedilol 12 5mg daily     Dyslipidemia  Assessment & Plan  -Continue home Lipitor 80mg daily     * Fibroid uterus  Assessment & Plan  -Status post total abdominal hysterectomy, bilateral salpingo-oophorectomy and cystoscopy on 2/9/22      Subjective:   Jordin Bernard feel well this morning  She tolerated oral intake overnight  Her pain is well controlled  She has not had return of bowel function as yet  She denies chest pain, shortness of breath or calf pain  Her lees catheter was removed this morning but she has not been up to urinate as yet  Objective:   Vitals: Blood pressure 139/69, pulse 97, temperature 97 9 °F (36 6 °C), resp  rate 18, height 5' 4" (1 626 m), weight 95 1 kg (209 lb 10 5 oz), SpO2 91 %, unknown if currently breastfeeding  Physical Exam:   Physical Exam  Vitals reviewed  Constitutional:       General: She is not in acute distress  Appearance: Normal appearance  She is obese  She is not ill-appearing or toxic-appearing  Cardiovascular:      Rate and Rhythm: Normal rate and regular rhythm  Heart sounds: Normal heart sounds  Pulmonary:      Effort: Pulmonary effort is normal       Breath sounds: Normal breath sounds  No wheezing, rhonchi or rales  Abdominal:      General: There is no distension  Palpations: Abdomen is soft  Tenderness: There is no abdominal tenderness  There is no guarding or rebound  Comments: Vertical midline incision is clean, dry and intact   Musculoskeletal:         General: No tenderness  Normal range of motion  Skin:     General: Skin is warm and dry  Neurological:      General: No focal deficit present  Mental Status: She is alert and oriented to person, place, and time  Psychiatric:         Mood and Affect: Mood normal          Behavior: Behavior normal          Lab, Imaging and other studies: I have personally reviewed pertinent reports  Lab Results   Component Value Date    WBC 16 32 (H) 02/10/2022    HGB 11 0 (L) 02/10/2022    HCT 36 0 02/10/2022    MCV 89 02/10/2022     02/10/2022         Opal Campbell MD  PGY II, OB/GYN  2/10/2022, 7:23 AM

## 2022-02-10 NOTE — QUICK NOTE
Patient with blood sugars in the 200s, most recently 286  Will up titrate her insulin from sliding scale algorithm 2 to sliding scale algorithm 3, and change her insulin coverage to q 6 hours instead of mealtime  She takes Januvia, metformin, and glipizide at home, these orals will be added on to start tomorrow morning    D/w Dr Polina Padilla [place] [id number], [facility] Gynecology PGY-2  2/9/2022  11:40 PM

## 2022-02-11 VITALS
RESPIRATION RATE: 18 BRPM | BODY MASS INDEX: 35.79 KG/M2 | WEIGHT: 209.66 LBS | SYSTOLIC BLOOD PRESSURE: 119 MMHG | TEMPERATURE: 97.9 F | HEART RATE: 96 BPM | HEIGHT: 64 IN | OXYGEN SATURATION: 94 % | DIASTOLIC BLOOD PRESSURE: 87 MMHG

## 2022-02-11 PROBLEM — D62 ACUTE BLOOD LOSS ANEMIA: Status: ACTIVE | Noted: 2022-02-11

## 2022-02-11 LAB
ANION GAP SERPL CALCULATED.3IONS-SCNC: 13 MMOL/L (ref 4–13)
BASOPHILS # BLD AUTO: 0.06 THOUSANDS/ΜL (ref 0–0.1)
BASOPHILS NFR BLD AUTO: 0 % (ref 0–1)
BUN SERPL-MCNC: 6 MG/DL (ref 5–25)
CALCIUM SERPL-MCNC: 8.1 MG/DL (ref 8.3–10.1)
CHLORIDE SERPL-SCNC: 102 MMOL/L (ref 100–108)
CO2 SERPL-SCNC: 21 MMOL/L (ref 21–32)
CREAT SERPL-MCNC: 0.79 MG/DL (ref 0.6–1.3)
EOSINOPHIL # BLD AUTO: 0.14 THOUSAND/ΜL (ref 0–0.61)
EOSINOPHIL NFR BLD AUTO: 1 % (ref 0–6)
ERYTHROCYTE [DISTWIDTH] IN BLOOD BY AUTOMATED COUNT: 15.7 % (ref 11.6–15.1)
GFR SERPL CREATININE-BSD FRML MDRD: 81 ML/MIN/1.73SQ M
GLUCOSE SERPL-MCNC: 159 MG/DL (ref 65–140)
GLUCOSE SERPL-MCNC: 174 MG/DL (ref 65–140)
HCT VFR BLD AUTO: 35.7 % (ref 34.8–46.1)
HGB BLD-MCNC: 11.3 G/DL (ref 11.5–15.4)
IMM GRANULOCYTES # BLD AUTO: 0.11 THOUSAND/UL (ref 0–0.2)
IMM GRANULOCYTES NFR BLD AUTO: 1 % (ref 0–2)
LYMPHOCYTES # BLD AUTO: 2.13 THOUSANDS/ΜL (ref 0.6–4.47)
LYMPHOCYTES NFR BLD AUTO: 13 % (ref 14–44)
MCH RBC QN AUTO: 28.2 PG (ref 26.8–34.3)
MCHC RBC AUTO-ENTMCNC: 31.7 G/DL (ref 31.4–37.4)
MCV RBC AUTO: 89 FL (ref 82–98)
MONOCYTES # BLD AUTO: 1.32 THOUSAND/ΜL (ref 0.17–1.22)
MONOCYTES NFR BLD AUTO: 8 % (ref 4–12)
NEUTROPHILS # BLD AUTO: 12.15 THOUSANDS/ΜL (ref 1.85–7.62)
NEUTS SEG NFR BLD AUTO: 77 % (ref 43–75)
NRBC BLD AUTO-RTO: 0 /100 WBCS
PLATELET # BLD AUTO: 245 THOUSANDS/UL (ref 149–390)
PMV BLD AUTO: 9.5 FL (ref 8.9–12.7)
POTASSIUM SERPL-SCNC: 3.5 MMOL/L (ref 3.5–5.3)
RBC # BLD AUTO: 4.01 MILLION/UL (ref 3.81–5.12)
SODIUM SERPL-SCNC: 136 MMOL/L (ref 136–145)
WBC # BLD AUTO: 15.91 THOUSAND/UL (ref 4.31–10.16)

## 2022-02-11 PROCEDURE — 80048 BASIC METABOLIC PNL TOTAL CA: CPT | Performed by: OBSTETRICS & GYNECOLOGY

## 2022-02-11 PROCEDURE — 99024 POSTOP FOLLOW-UP VISIT: CPT | Performed by: OBSTETRICS & GYNECOLOGY

## 2022-02-11 PROCEDURE — 82948 REAGENT STRIP/BLOOD GLUCOSE: CPT

## 2022-02-11 PROCEDURE — 85025 COMPLETE CBC W/AUTO DIFF WBC: CPT | Performed by: OBSTETRICS & GYNECOLOGY

## 2022-02-11 RX ORDER — OXYCODONE HYDROCHLORIDE 5 MG/1
5-10 TABLET ORAL EVERY 6 HOURS PRN
Qty: 20 TABLET | Refills: 0 | Status: SHIPPED | OUTPATIENT
Start: 2022-02-11 | End: 2022-02-21

## 2022-02-11 RX ORDER — DOCUSATE SODIUM 100 MG/1
100 CAPSULE, LIQUID FILLED ORAL 2 TIMES DAILY
Qty: 60 CAPSULE | Refills: 2 | Status: SHIPPED | OUTPATIENT
Start: 2022-02-11

## 2022-02-11 RX ORDER — ACETAMINOPHEN 325 MG/1
975 TABLET ORAL EVERY 8 HOURS
Start: 2022-02-11

## 2022-02-11 RX ORDER — LOSARTAN POTASSIUM 50 MG/1
100 TABLET ORAL DAILY
Status: DISCONTINUED | OUTPATIENT
Start: 2022-02-11 | End: 2022-02-11 | Stop reason: HOSPADM

## 2022-02-11 RX ORDER — ONDANSETRON 4 MG/1
4 TABLET, FILM COATED ORAL EVERY 8 HOURS PRN
Qty: 20 TABLET | Refills: 2 | Status: SHIPPED | OUTPATIENT
Start: 2022-02-11

## 2022-02-11 RX ADMIN — ACETAMINOPHEN 975 MG: 325 TABLET, FILM COATED ORAL at 11:22

## 2022-02-11 RX ADMIN — GLIPIZIDE 5 MG: 5 TABLET ORAL at 08:36

## 2022-02-11 RX ADMIN — CARVEDILOL 12.5 MG: 6.25 TABLET, FILM COATED ORAL at 08:31

## 2022-02-11 RX ADMIN — METFORMIN HYDROCHLORIDE 1000 MG: 500 TABLET ORAL at 08:31

## 2022-02-11 RX ADMIN — SITAGLIPTIN 100 MG: 100 TABLET, FILM COATED ORAL at 08:31

## 2022-02-11 RX ADMIN — OXYCODONE HYDROCHLORIDE 5 MG: 5 TABLET ORAL at 08:31

## 2022-02-11 RX ADMIN — ENOXAPARIN SODIUM 40 MG: 40 INJECTION SUBCUTANEOUS at 08:32

## 2022-02-11 RX ADMIN — ONDANSETRON 4 MG: 2 INJECTION INTRAMUSCULAR; INTRAVENOUS at 08:27

## 2022-02-11 RX ADMIN — LOSARTAN POTASSIUM 100 MG: 50 TABLET, FILM COATED ORAL at 11:23

## 2022-02-11 RX ADMIN — DOCUSATE SODIUM 100 MG: 100 CAPSULE ORAL at 08:31

## 2022-02-11 NOTE — PLAN OF CARE
Problem: MOBILITY - ADULT  Goal: Maintain or return to baseline ADL function  Description: INTERVENTIONS:  -  Assess patient's ability to carry out ADLs; assess patient's baseline for ADL function and identify physical deficits which impact ability to perform ADLs (bathing, care of mouth/teeth, toileting, grooming, dressing, etc )  - Assess/evaluate cause of self-care deficits   - Assess range of motion  - Assess patient's mobility; develop plan if impaired  - Assess patient's need for assistive devices and provide as appropriate  - Encourage maximum independence but intervene and supervise when necessary  - Involve family in performance of ADLs  - Assess for home care needs following discharge   - Consider OT consult to assist with ADL evaluation and planning for discharge  - Provide patient education as appropriate  Outcome: Progressing  Goal: Maintains/Returns to pre admission functional level  Description: INTERVENTIONS:  - Perform BMAT or MOVE assessment daily    - Set and communicate daily mobility goal to care team and patient/family/caregiver  - Collaborate with rehabilitation services on mobility goals if consulted  - Perform Range of Motion  times a day  - Reposition patient every  hours  - Dangle patient  times a day  - Stand patient  times a day  - Ambulate patient  times a day  - Out of bed to chair  times a day   - Out of bed for meals  times a day  - Out of bed for toileting  - Record patient progress and toleration of activity level   Outcome: Progressing     Problem: Nutrition/Hydration-ADULT  Goal: Nutrient/Hydration intake appropriate for improving, restoring or maintaining nutritional needs  Description: Monitor and assess patient's nutrition/hydration status for malnutrition  Collaborate with interdisciplinary team and initiate plan and interventions as ordered  Monitor patient's weight and dietary intake as ordered or per policy   Utilize nutrition screening tool and intervene as necessary  Determine patient's food preferences and provide high-protein, high-caloric foods as appropriate       INTERVENTIONS:  - Monitor oral intake, urinary output, labs, and treatment plans  - Assess nutrition and hydration status and recommend course of action  - Evaluate amount of meals eaten  - Assist patient with eating if necessary   - Allow adequate time for meals  - Recommend/ encourage appropriate diets, oral nutritional supplements, and vitamin/mineral supplements  - Order, calculate, and assess calorie counts as needed  - Recommend, monitor, and adjust tube feedings and TPN/PPN based on assessed needs  - Assess need for intravenous fluids  - Provide specific nutrition/hydration education as appropriate  - Include patient/family/caregiver in decisions related to nutrition  Outcome: Progressing     Problem: PAIN - ADULT  Goal: Verbalizes/displays adequate comfort level or baseline comfort level  Description: Interventions:  - Encourage patient to monitor pain and request assistance  - Assess pain using appropriate pain scale  - Administer analgesics based on type and severity of pain and evaluate response  - Implement non-pharmacological measures as appropriate and evaluate response  - Consider cultural and social influences on pain and pain management  - Notify physician/advanced practitioner if interventions unsuccessful or patient reports new pain  Outcome: Progressing     Problem: INFECTION - ADULT  Goal: Absence or prevention of progression during hospitalization  Description: INTERVENTIONS:  - Assess and monitor for signs and symptoms of infection  - Monitor lab/diagnostic results  - Monitor all insertion sites, i e  indwelling lines, tubes, and drains  - Monitor endotracheal if appropriate and nasal secretions for changes in amount and color  - [place] appropriate cooling/warming therapies per order  - Administer medications as ordered  - Instruct and encourage patient and family to use good hand hygiene technique  - Identify and instruct in appropriate isolation precautions for identified infection/condition  Outcome: Progressing  Goal: Absence of fever/infection during neutropenic period  Description: INTERVENTIONS:  - Monitor WBC    Outcome: Progressing     Problem: DISCHARGE PLANNING  Goal: Discharge to home or other facility with appropriate resources  Description: INTERVENTIONS:  - Identify barriers to discharge w/patient and caregiver  - Arrange for needed discharge resources and transportation as appropriate  - Identify discharge learning needs (meds, wound care, etc )  - Arrange for interpretive services to assist at discharge as needed  - Refer to Case Management Department for coordinating discharge planning if the patient needs post-hospital services based on physician/advanced practitioner order or complex needs related to functional status, cognitive ability, or social support system  Outcome: Progressing     Problem: Knowledge Deficit  Goal: Patient/family/caregiver demonstrates understanding of disease process, treatment plan, medications, and discharge instructions  Description: Complete learning assessment and assess knowledge base    Interventions:  - Provide teaching at level of understanding  - Provide teaching via preferred learning methods  Outcome: Progressing     Problem: GENITOURINARY - ADULT  Goal: Maintains or returns to baseline urinary function  Description: INTERVENTIONS:  - Assess urinary function  - Encourage oral fluids to ensure adequate hydration if ordered  - Administer IV fluids as ordered to ensure adequate hydration  - Administer ordered medications as needed  - Offer frequent toileting  - Follow urinary retention protocol if ordered  Outcome: Progressing  Goal: Absence of urinary retention  Description: INTERVENTIONS:  - Assess patients ability to void and empty bladder  - Monitor I/O  - Bladder scan as needed  - Discuss with physician/AP medications to alleviate retention as needed  - Discuss catheterization for long term situations as appropriate  Outcome: Progressing  Goal: Urinary catheter remains patent  Description: INTERVENTIONS:  - Assess patency of urinary catheter  - If patient has a chronic lees, consider changing catheter if non-functioning  - Follow guidelines for intermittent irrigation of non-functioning urinary catheter  Outcome: Progressing     Problem: Potential for Falls  Goal: Patient will remain free of falls  Description: INTERVENTIONS:  - Educate patient/family on patient safety including physical limitations  - Instruct patient to call for assistance with activity   - Consult OT/PT to assist with strengthening/mobility   - Keep Call bell within reach  - Keep bed low and locked with side rails adjusted as appropriate  - Keep care items and personal belongings within reach  - Initiate and maintain comfort rounds  - Make Fall Risk Sign visible to staff  - Offer Toileting every  Hours, in advance of need  - Initiate/Maintain alarm  - Obtain necessary fall risk management equipment: - Apply yellow socks and bracelet for high fall risk patients  - Consider moving patient to room near nurses station  Outcome: Progressing

## 2022-02-11 NOTE — PROGRESS NOTES
Progress Note - OB/GYN   Vida Scott 64 y o  female MRN: [id number]  Unit/Bed#: E5 -01 Encounter: [id number]    Assessment/Plan:  64 y o  post-operative day 2 status post total abdominal hysterectomy, bilateral salpingo-oophorectomy and cystoscopy for fibroid uterus  By problem:     Postoperative state  Assessment & Plan  -Pain controlled with scheduled Tylenol and Vandana PRN  -Encourage ambulation and incentive spirometer use  -DVT prophylaxis with Lovenox 40mg daily  -Voiding without issue  -FU final pathology    Type 2 diabetes mellitus without complication, without long-term current use of insulin [facility])  Assessment & Plan  Lab Results   Component Value Date    HGBA1C 7 2 (H) 01/27/2022       Recent Labs     02/10/22  1307 02/10/22  1601 02/10/22  1804 02/10/22  2048   POCGLU 158* 120 173* 134       Blood Sugar Average: Last 72 hrs:  (P) 196     -Diabetic diet and sliding scale insulin algorithm 3  -Home meds resumed: Januvia, Metformin, Glipizide      Benign essential hypertension  Assessment & Plan  -Continue home Carvedilol 12 5mg daily     Dyslipidemia  Assessment & Plan  -Continue home Lipitor 80mg daily     * Fibroid uterus  Assessment & Plan  -Status post total abdominal hysterectomy, bilateral salpingo-oophorectomy and cystoscopy on 2/9/22      Subjective:   Héctor Florian feel well this morning  Her pain is well controlled with oral meds  She is tolerating PO, she had on episode of nausea without vomiting and it resolved on its own  She is ambulating, voiding, and passing flatus  She denies chest pain, shortness of breath or calf pain  Objective:   Vitals: Blood pressure 141/73, pulse 103, temperature 98 1 °F (36 7 °C), resp  rate 18, height 5' 4" (1 626 m), weight 95 1 kg (209 lb 10 5 oz), SpO2 95 %, unknown if currently breastfeeding  Physical Exam:   Physical Exam  Vitals reviewed  Constitutional:       General: She is not in acute distress  Appearance: Normal appearance  She is obese   She is not ill-appearing or toxic-appearing  Cardiovascular:      Rate and Rhythm: Normal rate and regular rhythm  Heart sounds: Normal heart sounds  Pulmonary:      Effort: Pulmonary effort is normal       Breath sounds: Normal breath sounds  No wheezing, rhonchi or rales  Abdominal:      General: There is no distension  Palpations: Abdomen is soft  Tenderness: There is no abdominal tenderness  There is no guarding or rebound  Comments: Vertical midline incision is clean, dry and intact   Musculoskeletal:         General: No tenderness  Normal range of motion  Skin:     General: Skin is warm and dry  Neurological:      General: No focal deficit present  Mental Status: She is alert and oriented to person, place, and time  Psychiatric:         Mood and Affect: Mood normal          Behavior: Behavior normal          Lab, Imaging and other studies: I have personally reviewed pertinent reports  Lab Results   Component Value Date    WBC 16 32 (H) 02/10/2022    HGB 11 0 (L) 02/10/2022    HCT 36 0 02/10/2022    MCV 89 02/10/2022     02/10/2022         Cristal Crow MD  PGY 3, OB/GYN  2/11/2022, 6:52 AM

## 2022-02-15 ENCOUNTER — TRANSITIONAL CARE MANAGEMENT (OUTPATIENT)
Dept: INTERNAL MEDICINE CLINIC | Facility: CLINIC | Age: 62
End: 2022-02-15

## 2022-02-24 ENCOUNTER — TELEPHONE (OUTPATIENT)
Dept: HEMATOLOGY ONCOLOGY | Facility: CLINIC | Age: 62
End: 2022-02-24

## 2022-02-24 ENCOUNTER — OFFICE VISIT (OUTPATIENT)
Dept: OBGYN CLINIC | Facility: MEDICAL CENTER | Age: 62
End: 2022-02-24

## 2022-02-24 ENCOUNTER — APPOINTMENT (OUTPATIENT)
Dept: LAB | Facility: HOSPITAL | Age: 62
End: 2022-02-24
Attending: OBSTETRICS & GYNECOLOGY
Payer: COMMERCIAL

## 2022-02-24 VITALS
BODY MASS INDEX: 35.17 KG/M2 | DIASTOLIC BLOOD PRESSURE: 82 MMHG | WEIGHT: 206 LBS | SYSTOLIC BLOOD PRESSURE: 122 MMHG | HEIGHT: 64 IN

## 2022-02-24 DIAGNOSIS — Z90.79 S/P TAH-BSO: ICD-10-CM

## 2022-02-24 DIAGNOSIS — E78.5 DYSLIPIDEMIA: ICD-10-CM

## 2022-02-24 DIAGNOSIS — D39.12 GRANULOSA CELL TUMOR OF OVARY, LEFT: ICD-10-CM

## 2022-02-24 DIAGNOSIS — Z09 POSTOPERATIVE EXAMINATION: ICD-10-CM

## 2022-02-24 DIAGNOSIS — Z90.710 S/P TAH-BSO: ICD-10-CM

## 2022-02-24 DIAGNOSIS — Z09 POSTOPERATIVE EXAMINATION: Primary | ICD-10-CM

## 2022-02-24 DIAGNOSIS — Z90.722 S/P TAH-BSO: ICD-10-CM

## 2022-02-24 DIAGNOSIS — Z78.9 PRESENCE OF SURGICAL INCISION: ICD-10-CM

## 2022-02-24 LAB
ANION GAP SERPL CALCULATED.3IONS-SCNC: 11 MMOL/L (ref 4–13)
BASOPHILS # BLD AUTO: 0.06 THOUSANDS/ΜL (ref 0–0.1)
BASOPHILS NFR BLD AUTO: 1 % (ref 0–1)
BUN SERPL-MCNC: 12 MG/DL (ref 5–25)
CALCIUM SERPL-MCNC: 9 MG/DL (ref 8.3–10.1)
CHLORIDE SERPL-SCNC: 104 MMOL/L (ref 100–108)
CO2 SERPL-SCNC: 25 MMOL/L (ref 21–32)
CREAT SERPL-MCNC: 0.92 MG/DL (ref 0.6–1.3)
EOSINOPHIL # BLD AUTO: 0.21 THOUSAND/ΜL (ref 0–0.61)
EOSINOPHIL NFR BLD AUTO: 2 % (ref 0–6)
ERYTHROCYTE [DISTWIDTH] IN BLOOD BY AUTOMATED COUNT: 14.6 % (ref 11.6–15.1)
GFR SERPL CREATININE-BSD FRML MDRD: 67 ML/MIN/1.73SQ M
GLUCOSE SERPL-MCNC: 129 MG/DL (ref 65–140)
HCT VFR BLD AUTO: 37.9 % (ref 34.8–46.1)
HGB BLD-MCNC: 11.8 G/DL (ref 11.5–15.4)
IMM GRANULOCYTES # BLD AUTO: 0.04 THOUSAND/UL (ref 0–0.2)
IMM GRANULOCYTES NFR BLD AUTO: 0 % (ref 0–2)
LYMPHOCYTES # BLD AUTO: 2.78 THOUSANDS/ΜL (ref 0.6–4.47)
LYMPHOCYTES NFR BLD AUTO: 23 % (ref 14–44)
MCH RBC QN AUTO: 27.4 PG (ref 26.8–34.3)
MCHC RBC AUTO-ENTMCNC: 31.1 G/DL (ref 31.4–37.4)
MCV RBC AUTO: 88 FL (ref 82–98)
MONOCYTES # BLD AUTO: 0.89 THOUSAND/ΜL (ref 0.17–1.22)
MONOCYTES NFR BLD AUTO: 7 % (ref 4–12)
NEUTROPHILS # BLD AUTO: 8.28 THOUSANDS/ΜL (ref 1.85–7.62)
NEUTS SEG NFR BLD AUTO: 67 % (ref 43–75)
NRBC BLD AUTO-RTO: 0 /100 WBCS
PLATELET # BLD AUTO: 312 THOUSANDS/UL (ref 149–390)
PMV BLD AUTO: 9.5 FL (ref 8.9–12.7)
POTASSIUM SERPL-SCNC: 4.5 MMOL/L (ref 3.5–5.3)
RBC # BLD AUTO: 4.3 MILLION/UL (ref 3.81–5.12)
SODIUM SERPL-SCNC: 140 MMOL/L (ref 136–145)
TSH SERPL DL<=0.05 MIU/L-ACNC: 1.69 UIU/ML (ref 0.36–3.74)
WBC # BLD AUTO: 12.26 THOUSAND/UL (ref 4.31–10.16)

## 2022-02-24 PROCEDURE — 85025 COMPLETE CBC W/AUTO DIFF WBC: CPT

## 2022-02-24 PROCEDURE — 83520 IMMUNOASSAY QUANT NOS NONAB: CPT

## 2022-02-24 PROCEDURE — 84443 ASSAY THYROID STIM HORMONE: CPT

## 2022-02-24 PROCEDURE — 99024 POSTOP FOLLOW-UP VISIT: CPT | Performed by: OBSTETRICS & GYNECOLOGY

## 2022-02-24 PROCEDURE — 36415 COLL VENOUS BLD VENIPUNCTURE: CPT

## 2022-02-24 PROCEDURE — 80048 BASIC METABOLIC PNL TOTAL CA: CPT

## 2022-02-24 NOTE — PROGRESS NOTES
Assessment:  64 y o  postmenopausal woman who presents POD#2wks from [facility], BSO for large fibroid uterus  Plan:  Diagnoses and all orders for this visit:    Postoperative examination  S/P [facility]-BSO  - Routine postop care  - Surgical pathology and intraoperative findings reviewed  - Return at 6wks for postop follow up (may complete with GYN Onc if timing appropriate; needs cuff check)    Granulosa cell tumor of ovary, left  -     Inhibin B; Future  -     Ambulatory Referral to Gynecologic Oncology; Future    Presence of surgical incision: no overt signs of infection, but at high risk in this area due to location  -     mupirocin (BACTROBAN) 2 % ointment; Apply topically 3 (three) times a day for 7 days          __________________________________________________________________    Subjective   Rogelio Rich is a 64 y o  postmenopausal woman who presents POD#2wks from [facility], BSO for large fibroid uterus  Patient reports she is overall doing well  She does note occ blood from incision, so shes been placing a pad on top to catch anything  Decreasing with time  Denies redness, purulent drainage, or increasing pain  Pain is well controlled and she is noticing it mostly as back discomfort  Appetite is not great  No nausea, but decreased desire to eat  Having regular BMs and appetite is improving with time, just slowly  Denies fever/chills, chest pain, palpitations, vomiting, or other concerns  Reviewed with patient her pathology  Uterus and fibroid benign  Left ovary notable for granulosa cell tumor  We discussed this pathology, which was not suggested on intraoperative frozen pathology  As such, staging was not completed  Discussed tumor capsule rupture, which occurred prior to procedure but does affect staging  I recommend consultation with a GYN Oncologist to determine if further staging is needed and to aid in management and/or surveillance   Patient expresses that this diagnosis comes as a surprise to her, but she is understanding of the discussion  Objective  /82   Ht 5' 4" (1 626 m)   Wt 93 4 kg (206 lb)   BMI 35 36 kg/m²      Physical Exam:  Physical Exam  Constitutional:       General: She is not in acute distress  Appearance: Normal appearance  She is not ill-appearing, toxic-appearing or diaphoretic  Eyes:      General: No scleral icterus  Right eye: No discharge  Left eye: No discharge  Conjunctiva/sclera: Conjunctivae normal    Cardiovascular:      Rate and Rhythm: Normal rate  Pulmonary:      Effort: Pulmonary effort is normal  No respiratory distress  Abdominal:      General: There is no distension  Palpations: There is no mass  Tenderness: There is no abdominal tenderness  There is no guarding or rebound  Hernia: No hernia is present  Comments: Midline incision clean and dry  Intact apart from 3mm portion at inferior margin, which is slightly   Incision edges mildly erythematous  No induration or drainage  This portion is located under abdominal fold  Musculoskeletal:         General: No swelling  Skin:     General: Skin is warm and dry  Coloration: Skin is not jaundiced or pale  Findings: No bruising or erythema  Neurological:      Mental Status: She is alert  Psychiatric:         Mood and Affect: Mood normal          Behavior: Behavior normal          Thought Content: Thought content normal          Judgment: Judgment normal            Surgical Pathology  "A  Left ovary and left fallopian tube:  - Ovarian sex cord stromal tumor, 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  See comment and synoptic report  - Benign fallopian tube       B   Uterus, cervix, right fallopian tube, right ovary:  - Inactive endometrium with endometrial polyp   - Detached leiomyoma with focal nuclear pleomorphism without necrosis, 16 5 cm in greatest dimension   - Benign ovary and fallopian tube  - No definite cervix identified      Comment: Immunohistochemistry in the tumor cells has variable expression of inhibin, calretinin, WT-1 and vimentin, with focal nodular and intercellular growth patterns highlighted by reticulin stain  CD10, ER, and desmin are negative  The findings are consistent with the above diagnosis      8th Ed AJCC Tumor Stage:  at least Stage IC2 - pT1c2, pNX    Representative tumor block: A2"

## 2022-02-24 NOTE — TELEPHONE ENCOUNTER
New Patient Intake Form   Patient Details:    Glynn Castaneda  1960  [id number]    Appointment Information   Who is calling to schedule? Patient   If not self, what is the caller's name? Please put name of [facility] nurse as well  Referring provider Suzanne Jarrett MD   What is the diagnosis? Granulosa cell tumor of ovary, left     Is there a confirmed tissue diagnosis? Yes     Is there a biopsy ordered or pending? Please specify dates   02/09     Is patient aware of diagnosis? Yes   Have you had any imaging or labs done? If yes, where? (If imaging done outside of [facility], please remind patient to bring a disk ) Yes     If imaging done at outside facility, did you instruct patient to obtain discs and bring to visit? 01/19   Have you been seen by another Oncologist/Hematologist?  If so, who and where? no   Are the records in [facility] or Care Everywhere? yes   Does the patient have records at another facility/hospital? no   If yes, Name of facility, city and state where facility is located  Did you instruct patient to have records faxed to [facility] and provide [facility] number? n/a   Preferred [facility]   Is the patient willing to be seen by another provider?   (This is for breast patients only) n/a   Miscellaneous Information: appt made for 03/07

## 2022-03-01 LAB — INHIBIN B SERPL-MCNC: <7 PG/ML (ref 0–16.9)

## 2022-03-03 ENCOUNTER — TELEPHONE (OUTPATIENT)
Dept: GYNECOLOGIC ONCOLOGY | Facility: CLINIC | Age: 62
End: 2022-03-03

## 2022-03-03 NOTE — TELEPHONE ENCOUNTER
 Hello, can I please speak to (patient name) this is (enter your name here) calling from [facility]) to remind you of your appointment on (date and time) at (location)  I am calling to review our no-show/cancelation policy and complete your COVID screening questions  Do you have a few minutes? We ask that you come at least 15 minutes early for your appointment to complete all paperwork, if you are 20 minutes late for your appointment, we may need to reschedule you  We require at least 24-hour notice for cancelations and if you miss your appointment 3 times, we may unfortunately not be able to reschedule your future visit  Considering the current events related to the COVID-19 virus and in being proactive and making sure we are keeping our patients, their families and staff safe, we are screening prior to all patient appointments      1  Are you currently experiencing any symptoms of fever, cough, shortness of breath, chills, repeated shaking with chills, muscle pain, headache, sore throat, or new loss of taste/smell? ? Yes - offer a video visit with the provider    ? No - continue to the next question     Patient's Answer to question #1:    2  Have you been tested for COVID-19 within the past 5 days? ? Yes (positive) - schedule telemedicine   ? Yes (negative) - continue with visit   ? Yes (awaiting results - no symptoms) - notify provider to determine in person vs  telehealth vs  reschedule   o Patient may have been tested for a surgical procedure   ? Yes (awaiting results - has symptoms) - schedule telemedicine    No - continue with visit    Patient's Answer to question #2:      LVM regarding our no-show/cancelation policy and left the office number to call back to answer the COVID questions

## 2022-03-07 ENCOUNTER — CONSULT (OUTPATIENT)
Dept: GYNECOLOGIC ONCOLOGY | Facility: CLINIC | Age: 62
End: 2022-03-07
Payer: COMMERCIAL

## 2022-03-07 VITALS
DIASTOLIC BLOOD PRESSURE: 80 MMHG | BODY MASS INDEX: 35.24 KG/M2 | TEMPERATURE: 99.5 F | SYSTOLIC BLOOD PRESSURE: 150 MMHG | HEIGHT: 64 IN | WEIGHT: 206.4 LBS

## 2022-03-07 DIAGNOSIS — D39.12 GRANULOSA CELL TUMOR OF OVARY, LEFT: ICD-10-CM

## 2022-03-07 DIAGNOSIS — C56.9 MALIGNANT NEOPLASM OF OVARY, UNSPECIFIED LATERALITY (HCC): Primary | ICD-10-CM

## 2022-03-07 PROCEDURE — 99024 POSTOP FOLLOW-UP VISIT: CPT | Performed by: OBSTETRICS & GYNECOLOGY

## 2022-03-07 PROCEDURE — 3077F SYST BP >= 140 MM HG: CPT | Performed by: OBSTETRICS & GYNECOLOGY

## 2022-03-07 PROCEDURE — 3079F DIAST BP 80-89 MM HG: CPT | Performed by: OBSTETRICS & GYNECOLOGY

## 2022-03-07 NOTE — ASSESSMENT & PLAN NOTE
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 identified incidentally at the time of removal of uterus for large fibroids  The patient had no clinical evidence of metastatic disease at the time of surgery  We have recommended CT scan of chest abdomen pelvis to rule out any residual disease  Her inhibin B is normal at less than 7  We discussed treatment options of surgical staging, chemotherapy, hormonal therapy  At this point due to no evidence of residual disease we have recommended observation  This is of course based on the results of pending CT scan  We have recommended seeing the patient back every 3 months for 2 years followed by every 6 months for 3 years thereafter repeat inhibin B and inhibin a levels will be drawn each visit

## 2022-03-07 NOTE — PROGRESS NOTES
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 identified incidentally at the time of removal of uterus for large fibroids  The patient had no clinical evidence of metastatic disease at the time of surgery  We have recommended CT scan of chest abdomen pelvis to rule out any residual disease  Her inhibin B is normal at less than 7  We discussed treatment options of surgical staging, chemotherapy, hormonal therapy  At this point due to no evidence of residual disease we have recommended observation  This is of course based on the results of pending CT scan  We have recommended seeing the patient back every 3 months for 2 years followed by every 6 months for 3 years thereafter repeat inhibin B and inhibin a levels will be drawn each visit  Other Visit Diagnoses     Granulosa cell tumor of ovary, left        Relevant Orders    CT chest abdomen pelvis w contrast    Inhibin A    Inhibin B              CHIEF COMPLAINT:  Newly diagnosed 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        Previous therapy:  Oncology History   Ovarian cancer ([facility] [address] )   3/7/2022 Initial Diagnosis    Ovarian cancer ([facility] [address] )     3/7/2022 -  Cancer Staged    Staging form: Ovary, Fallopian Tube, Primary Peritoneal, AJCC 8th Edition  - Clinical: FIGO Stage IC2, calculated as Stage IC (cT1c2, cN0, cM0) - Signed by Gwendolyn Jauregui MD on 3/7/2022       3/7/2022 -  Cancer Staged    Staging form: Ovary, Fallopian Tube, Primary Peritoneal, AJCC 8th Edition  - Pathologic stage from 3/7/2022:  FIGO Stage IC2, calculated as Stage Unknown (pT1c2, pNX, cM0) - Signed by Gwendolyn Jauregui MD on 3/7/2022  Histopathologic type: Granulosa cell tumor, adult type  Stage prefix: Initial diagnosis  Histologic grade (G): G1  Histologic grading system: 4 grade system  Residual tumor (R): R0 - None             Patient ID: Odette Cota is a 64 y o  female  Patient is very pleasant 70-year-old female seen in consultation from Dr Lukas May regarding evaluation and management of 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  Patient had pain in December of 2021  A CT scan was performed  Patient was diagnosed with a large 24 week size uterus and adnexal mass  She underwent exploratory laparotomy ROMÁN BSO  Findings at the time of the OR indicated hemorrhagic ovarian cyst which was negative for pathology on frozen section  Final pathology reports revealed the following:  Final Diagnosis  A  Left ovary and left fallopian tube:  - Ovarian sex cord stromal tumor, 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  See comment and synoptic report  - Benign fallopian tube       B  Uterus, cervix, right fallopian tube, right ovary:  - Inactive endometrium with endometrial polyp   - Detached leiomyoma with focal nuclear pleomorphism without necrosis, 16 5 cm in greatest dimension   - Benign ovary and fallopian tube  - No definite cervix identified      Comment: Immunohistochemistry in the tumor cells has variable expression of inhibin, calretinin, WT-1 and vimentin, with focal nodular and intercellular growth patterns highlighted by reticulin stain  CD10, ER, and desmin are negative  The findings are consistent with the above diagnosis      Postoperatively the patient has done well  Today, the patient is doing well  She denies significant abdominal pain, pelvic pain, nausea, vomiting, constipation, diarrhea, fevers, chills, or vaginal bleeding  Postoperative inhibin B level was drawn and noted to be less than 7  Review of Systems   Constitutional: Negative  HENT: Negative  Eyes: Negative  Respiratory: Negative  Cardiovascular: Negative  Gastrointestinal: Negative      Endocrine: Negative  Genitourinary: Negative  Musculoskeletal: Negative  Skin: Negative  Neurological: Negative  Hematological: Negative  Psychiatric/Behavioral: Negative          Current Outpatient Medications   Medication Sig Dispense Refill    acetaminophen (TYLENOL) 325 mg tablet Take 3 tablets (975 mg total) by mouth every 8 (eight) hours      aspirin 325 mg tablet Take 325 mg by mouth daily      carvedilol (COREG) 12 5 mg tablet take 1 tablet by mouth twice a day with meals 180 tablet 3    cetirizine (ZyrTEC) 10 mg tablet take 1 tablet by mouth once daily 30 tablet 3    Cholecalciferol (VITAMIN D3 PO) Take 2,000 Units by mouth 2 (two) times a day      cyanocobalamin 2000 MCG tablet Take 2,000 mcg by mouth daily      docusate sodium (COLACE) 100 mg capsule Take 1 capsule (100 mg total) by mouth 2 (two) times a day 60 capsule 2    fluticasone (FLONASE) 50 mcg/act nasal spray 1 spray into each nostril daily 15 8 mL 5    glipiZIDE (GLUCOTROL) 5 mg tablet take 1 tablet by mouth once daily 30 tablet 3    Glucose Blood (ONETOUCH TEST VI) by In Vitro route      glucose blood (OneTouch Verio) test strip Test 3 times daily 100 each 5    Januvia 100 MG tablet Take 1 tablet (100 mg total) by mouth daily 30 tablet 3    Lancets (OneTouch Delica Plus [id number]) MISC use 1 LANCET to TEST BLOOD SUGAR three times a day 100 each 1    losartan (COZAAR) 100 MG tablet take 1 tablet by mouth once daily 90 tablet 3    metFORMIN (GLUCOPHAGE) 500 mg tablet take 2 tablets by mouth every morning and 2 tablets by mouth every evening 120 tablet 5    Multiple Vitamin-Folic Acid TABS Take by mouth      mupirocin (BACTROBAN) 2 % ointment Apply topically 3 (three) times a day for 7 days 22 g 0    nitroglycerin (NITROSTAT) 0 4 mg SL tablet Place 1 tablet (0 4 mg total) under the tongue every 5 (five) minutes as needed for chest pain 30 tablet 3    nystatin (MYCOSTATIN) cream Apply topically 2 (two) times a day 30 g 0  nystatin (MYCOSTATIN) powder Apply topically 2 (two) times a day 60 g 3    ondansetron (ZOFRAN) 4 mg tablet Take 1 tablet (4 mg total) by mouth every 8 (eight) hours as needed for nausea or vomiting 20 tablet 2    rosuvastatin (CRESTOR) 40 MG tablet take 1 tablet by mouth once daily 90 tablet 3     No current facility-administered medications for this visit  Allergies   Allergen Reactions    Hctz [Hydrochlorothiazide] Shortness Of Breath and Dizziness    Wellbutrin [Bupropion] Shortness Of Breath     Category:  Adverse Reaction;     Ace Inhibitors Cough       Past Medical History:   Diagnosis Date    Cardiac disease     MI May 25, 2013    Chronic neck pain     Last Assessed:16    Diabetes mellitus ([facility] [address] )     Hyperlipidemia     Hypertension     Left bundle branch block     Last Assessed:13    Mild depression (HCC)     Last Assessed:16    Tonsillar hypertrophy     Last Assessed:13       Past Surgical History:   Procedure Laterality Date    CARDIAC SURGERY      1 stent placed    CORONARY ANGIOPLASTY WITH STENT PLACEMENT  2013    per [employer]: Cath stent 1 type drug-eluting, mild RCA    TX TOTAL ABDOM HYSTERECTOMY N/A 2022    Procedure: HYSTERECTOMY TOTAL ABDOMINAL (ROMÁN); B/L SALPINGO-OOPHORECTOMY, CYSTOSCOPY;  Surgeon: Catina Clark MD;  Location: [facility];  Service: Obstetrics       OB History        1    Para        Term                AB        Living           SAB        IAB        Ectopic        Multiple        Live Births                     Family History   Problem Relation Age of Onset    Arthritis Mother     Colon cancer Mother 66    Coronary artery disease Mother     Fibrocystic breast disease Mother     Hypertension Mother    Madeline Lama Cardiomyopathy Mother         Ischemic Dilated Cardiomyopathy    Arthritis Father     Kidney disease Father         Chronic (NKF classification)    Coronary artery disease Father     Gout Father  Prostate cancer Father     Hyperlipidemia Father         Pure Hypercholesterolemia    Stroke Father         Stroke Syndrome    Diabetes type II Father     Hypertension Sister     Hypothyroidism Sister     Other Sister         IVP- Solitary Kidney Calculus    Rheum arthritis Sister     Alcohol abuse Brother     Bipolar disorder Brother         NOS    Other Brother         IVP- Solitary Kidney Calculus    Montgomery's disease Brother     Cerebral aneurysm Brother         Ruptured    Breast cancer Maternal Aunt     Peripheral vascular disease Maternal Aunt     Breast cancer Cousin         2 maternal cousins    Lung cancer Maternal Uncle     Diabetes type II Maternal Uncle     Thrombocytopenia Daughter         Idiopathic Thrombocytopenia Purpura at 10 yo    Fibroids Paternal Grandmother        The following portions of the patient's history were reviewed and updated as appropriate: allergies, current medications, past family history, past social history, past surgical history and problem list       Objective:    Blood pressure 150/80, temperature 99 5 °F (37 5 °C), temperature source Tympanic, height 5' 4" (1 626 m), weight 93 6 kg (206 lb 6 4 oz), unknown if currently breastfeeding  Body mass index is 35 43 kg/m²  Physical Exam  Constitutional:       Appearance: She is well-developed  HENT:      Head: Normocephalic and atraumatic  Neck:      Thyroid: No thyromegaly  Cardiovascular:      Rate and Rhythm: Normal rate and regular rhythm  Heart sounds: Normal heart sounds  Pulmonary:      Effort: Pulmonary effort is normal       Breath sounds: Normal breath sounds  Abdominal:      General: Bowel sounds are normal       Palpations: Abdomen is soft  Comments: Well healed midline incision  Genitourinary:     Comments: -Normal external female genitalia, normal Bartholin's and [place]'s glands                  -Normal midline urethral meatus   No lesions notes                  -Bladder without fullness mass or tenderness                  -Vagina without lesion or discharge No significant cystocele or rectocele noted                  -Cervix surgically absent                  -Uterus surgically absent                  -Adnexae surgically absent                  - Anus without fissure of lesion    Musculoskeletal:         General: Normal range of motion  Cervical back: Normal range of motion and neck supple  Lymphadenopathy:      Cervical: No cervical adenopathy  Skin:     General: Skin is warm and dry  Neurological:      Mental Status: She is alert and oriented to person, place, and time     Psychiatric:         Behavior: Behavior normal            Lab Results   Component Value Date     6 1 01/19/2022     Lab Results   Component Value Date    WBC 12 26 (H) 02/24/2022    HGB 11 8 02/24/2022    HCT 37 9 02/24/2022    MCV 88 02/24/2022     02/24/2022     Lab Results   Component Value Date    K 4 5 02/24/2022     02/24/2022    CO2 25 02/24/2022    BUN 12 02/24/2022    CREATININE 0 92 02/24/2022    GLUF 182 (H) 01/27/2022    CALCIUM 9 0 02/24/2022    AST 16 01/27/2022    ALT 30 01/27/2022    ALKPHOS 95 01/27/2022    EGFR 67 02/24/2022        Trend:  Lab Results   Component Value Date     6 1 01/19/2022

## 2022-03-15 ENCOUNTER — HOSPITAL ENCOUNTER (OUTPATIENT)
Dept: CT IMAGING | Facility: HOSPITAL | Age: 62
Discharge: HOME | End: 2022-03-15
Payer: COMMERCIAL

## 2022-03-15 DIAGNOSIS — D39.12 GRANULOSA CELL TUMOR OF OVARY, LEFT: ICD-10-CM

## 2022-03-15 PROCEDURE — 74177 CT ABD & PELVIS W/CONTRAST: CPT

## 2022-03-15 PROCEDURE — G1004 CDSM NDSC: HCPCS

## 2022-03-15 PROCEDURE — 71260 CT THORAX DX C+: CPT

## 2022-03-15 RX ADMIN — IOHEXOL 100 ML: 350 INJECTION, SOLUTION INTRAVENOUS at 09:42

## 2022-03-22 ENCOUNTER — TELEPHONE (OUTPATIENT)
Dept: OTOLARYNGOLOGY | Facility: CLINIC | Age: 62
End: 2022-03-22

## 2022-03-22 NOTE — TELEPHONE ENCOUNTER
I called and left message for the patient to call the office back regarding an ENT consult  I was following up with the referral that is in the system

## 2022-04-01 ENCOUNTER — TELEPHONE (OUTPATIENT)
Dept: OTOLARYNGOLOGY | Facility: CLINIC | Age: 62
End: 2022-04-01

## 2022-04-01 NOTE — TELEPHONE ENCOUNTER
I called and left message for second time for the patient to call the office back regarding an ENT consult

## 2022-04-05 ENCOUNTER — TELEPHONE (OUTPATIENT)
Dept: OTOLARYNGOLOGY | Facility: CLINIC | Age: 62
End: 2022-04-05

## 2022-04-05 ENCOUNTER — VBI (OUTPATIENT)
Dept: ADMINISTRATIVE | Facility: OTHER | Age: 62
End: 2022-04-05

## 2022-04-05 NOTE — TELEPHONE ENCOUNTER
I called and left message for the 3rd time for the patient to call the office back regarding an ENT consult

## 2022-04-14 DIAGNOSIS — H57.89 EYE DRAINAGE: ICD-10-CM

## 2022-04-14 DIAGNOSIS — H04.129 DRY EYE: ICD-10-CM

## 2022-04-14 RX ORDER — CETIRIZINE HYDROCHLORIDE 10 MG/1
TABLET ORAL
Qty: 30 TABLET | Refills: 3 | Status: SHIPPED | OUTPATIENT
Start: 2022-04-14

## 2022-04-18 DIAGNOSIS — E11.00 TYPE 2 DIABETES MELLITUS WITH HYPEROSMOLARITY WITHOUT COMA, WITHOUT LONG-TERM CURRENT USE OF INSULIN (HCC): ICD-10-CM

## 2022-04-28 DIAGNOSIS — E11.9 TYPE 2 DIABETES MELLITUS WITHOUT COMPLICATION, WITHOUT LONG-TERM CURRENT USE OF INSULIN (HCC): ICD-10-CM

## 2022-04-28 RX ORDER — SITAGLIPTIN 100 MG/1
TABLET, FILM COATED ORAL
Qty: 30 TABLET | Refills: 3 | Status: SHIPPED | OUTPATIENT
Start: 2022-04-28 | End: 2022-05-10

## 2022-05-10 DIAGNOSIS — E11.9 TYPE 2 DIABETES MELLITUS WITHOUT COMPLICATION, WITHOUT LONG-TERM CURRENT USE OF INSULIN (HCC): ICD-10-CM

## 2022-05-10 RX ORDER — SITAGLIPTIN 100 MG/1
TABLET, FILM COATED ORAL
Qty: 30 TABLET | Refills: 3 | Status: SHIPPED | OUTPATIENT
Start: 2022-05-10

## 2022-05-15 DIAGNOSIS — I10 ESSENTIAL HYPERTENSION: ICD-10-CM

## 2022-05-16 RX ORDER — LOSARTAN POTASSIUM 100 MG/1
TABLET ORAL
Qty: 90 TABLET | Refills: 3 | Status: SHIPPED | OUTPATIENT
Start: 2022-05-16

## 2022-05-18 ENCOUNTER — TELEPHONE (OUTPATIENT)
Dept: INTERNAL MEDICINE CLINIC | Facility: CLINIC | Age: 62
End: 2022-05-18

## 2022-05-18 NOTE — TELEPHONE ENCOUNTER
Merlin Stai called to say that she tested positive for COVID today with an at home COVID test  She stated that she started with symptoms on Monday  Her symptoms are:  Chills, fever, sore throat, body aches, her pulse ox is 99%  I told her to drink plenty of fluids, rest and to keep checking her pulse ox  If anything changes to call us back

## 2022-05-19 DIAGNOSIS — U07.1 COVID-19: Primary | ICD-10-CM

## 2022-05-24 DIAGNOSIS — I10 BENIGN ESSENTIAL HYPERTENSION: ICD-10-CM

## 2022-05-24 DIAGNOSIS — E78.5 DYSLIPIDEMIA: ICD-10-CM

## 2022-05-24 RX ORDER — CARVEDILOL 12.5 MG/1
TABLET ORAL
Qty: 180 TABLET | Refills: 3 | Status: SHIPPED | OUTPATIENT
Start: 2022-05-24

## 2022-05-24 RX ORDER — ROSUVASTATIN CALCIUM 40 MG/1
TABLET, COATED ORAL
Qty: 90 TABLET | Refills: 3 | Status: SHIPPED | OUTPATIENT
Start: 2022-05-24

## 2022-05-25 ENCOUNTER — TELEPHONE (OUTPATIENT)
Dept: SURGICAL ONCOLOGY | Facility: CLINIC | Age: 62
End: 2022-05-25

## 2022-05-25 NOTE — TELEPHONE ENCOUNTER
Marbin Ceballos called in to reschedule appt with Dr Marc due to her and her family being sick with covid  Rescheduled her to 6/13 at 11:45am at [facility] [address] office  She was agreeable

## 2022-05-31 ENCOUNTER — TELEPHONE (OUTPATIENT)
Dept: INTERNAL MEDICINE CLINIC | Facility: CLINIC | Age: 62
End: 2022-05-31

## 2022-05-31 DIAGNOSIS — J01.10 ACUTE NON-RECURRENT FRONTAL SINUSITIS: ICD-10-CM

## 2022-05-31 RX ORDER — FLUTICASONE PROPIONATE 50 MCG
1 SPRAY, SUSPENSION (ML) NASAL DAILY
Qty: 15.8 ML | Refills: 5 | Status: SHIPPED | OUTPATIENT
Start: 2022-05-31 | End: 2022-07-06 | Stop reason: SDUPTHER

## 2022-05-31 NOTE — TELEPHONE ENCOUNTER
Chapito Margaret and her family are COVID positive    Stated she is feeling better, but would like Flonase called into [facility] in [facility]

## 2022-06-06 ENCOUNTER — APPOINTMENT (OUTPATIENT)
Dept: LAB | Facility: HOSPITAL | Age: 62
End: 2022-06-06
Payer: COMMERCIAL

## 2022-06-06 DIAGNOSIS — D39.12 GRANULOSA CELL TUMOR OF OVARY, LEFT: ICD-10-CM

## 2022-06-06 PROCEDURE — 83520 IMMUNOASSAY QUANT NOS NONAB: CPT

## 2022-06-06 PROCEDURE — 86336 INHIBIN A: CPT

## 2022-06-08 LAB — INHIBIN B SERPL-MCNC: <7 PG/ML (ref 0–16.9)

## 2022-06-09 LAB — INHIBIN A SERPL-MCNC: 2.6 PG/ML

## 2022-06-13 ENCOUNTER — OFFICE VISIT (OUTPATIENT)
Dept: GYNECOLOGIC ONCOLOGY | Facility: CLINIC | Age: 62
End: 2022-06-13
Payer: COMMERCIAL

## 2022-06-13 VITALS
BODY MASS INDEX: 35.68 KG/M2 | WEIGHT: 209 LBS | DIASTOLIC BLOOD PRESSURE: 78 MMHG | HEIGHT: 64 IN | SYSTOLIC BLOOD PRESSURE: 140 MMHG | TEMPERATURE: 100.2 F

## 2022-06-13 DIAGNOSIS — Z12.31 ENCOUNTER FOR SCREENING MAMMOGRAM FOR MALIGNANT NEOPLASM OF BREAST: ICD-10-CM

## 2022-06-13 DIAGNOSIS — C56.9 MALIGNANT NEOPLASM OF OVARY, UNSPECIFIED LATERALITY (HCC): Primary | ICD-10-CM

## 2022-06-13 PROCEDURE — 3078F DIAST BP <80 MM HG: CPT | Performed by: OBSTETRICS & GYNECOLOGY

## 2022-06-13 PROCEDURE — 99214 OFFICE O/P EST MOD 30 MIN: CPT | Performed by: OBSTETRICS & GYNECOLOGY

## 2022-06-13 PROCEDURE — 3077F SYST BP >= 140 MM HG: CPT | Performed by: OBSTETRICS & GYNECOLOGY

## 2022-06-13 NOTE — ASSESSMENT & PLAN NOTE
Patient is very pleasant 70-year-old female history of stage 1 a C2 granulosa cell tumor of the ovary status post TAHBSO in November of 2021  The patient is presently doing well  Her exam is unremarkable  Her tumor markers are in range  The patient appears to be in a clinical remission  At this point we will order a mammogram as this has not recently been scheduled    The patient will follow-up in 3 months for repeat evaluation Dupixent Pregnancy And Lactation Text: This medication likely crosses the placenta but the risk for the fetus is uncertain. This medication is excreted in breast milk.

## 2022-06-13 NOTE — PROGRESS NOTES
Assessment/Plan:    Problem List Items Addressed This Visit        Endocrine    Ovarian cancer [facility]) - Primary     Patient is very pleasant 77-year-old female history of stage 1 a C2 granulosa cell tumor of the ovary status post TAHBSO in November of 2021  The patient is presently doing well  Her exam is unremarkable  Her tumor markers are in range  The patient appears to be in a clinical remission  At this point we will order a mammogram as this has not recently been scheduled  The patient will follow-up in 3 months for repeat evaluation           Relevant Orders    Inhibin A    Inhibin B      Other Visit Diagnoses     Encounter for screening mammogram for malignant neoplasm of breast        Relevant Orders    Mammo screening bilateral w 3d & cad            CHIEF COMPLAINT:  Surveillance stage I C granulosa cell tumor of the ovary      Problem:  Cancer Staging  Ovarian cancer ([facility] [address] )  Staging form: Ovary, Fallopian Tube, Primary Peritoneal, AJCC 8th Edition  - Clinical: FIGO Stage IC2, calculated as Stage IC (cT1c2, cN0, cM0) - Signed by Kay Lambert MD on 3/7/2022  - Pathologic stage from 3/7/2022: FIGO Stage IC2, calculated as Stage Unknown (pT1c2, pNX, cM0) - Signed by Kay Lambert MD on 3/7/2022        Previous therapy:  Oncology History   Ovarian cancer ([facility] [address] )   12/2021 Surgery    TAHBSO for fibroid uterus of 24 weeks with an incidental finding of a 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  Surgery performed by Love Basilio     3/7/2022 Initial Diagnosis    Ovarian cancer ([facility] [address] )     3/7/2022 -  Cancer Staged    Staging form: Ovary, Fallopian Tube, Primary Peritoneal, AJCC 8th Edition  - Clinical: FIGO Stage IC2, calculated as Stage IC (cT1c2, cN0, cM0) - Signed by Kay Lambert MD on 3/7/2022       3/7/2022 -  Cancer Staged    Staging form: Ovary, Fallopian Tube, Primary Peritoneal, AJCC 8th Edition  - Pathologic stage from 3/7/2022:  FIGO Stage IC2, calculated as Stage Unknown (pT1c2, pNX, cM0) - Signed by Mario Caballero MD on 3/7/2022  Histopathologic type: Granulosa cell tumor, adult type  Stage prefix: Initial diagnosis  Histologic grade (G): G1  Histologic grading system: 4 grade system  Residual tumor (R): R0 - None             Patient ID: Gareth Mijares is a 64 y o  female  Patient is very pleasant 77-year-old female who had an incidental 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 identified at time of hysterectomy for large fibroid uterus in November of 2021  Postoperatively the patient had done well  The patient was referred to gyn Oncology  Recommended close follow-up with inhibin levels  The patient reports today in follow-up  Patient is very pleasant 77-year-old female with history of 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  She underwent robot assisted Inhibin A of 2 6 and inhibin B of less than 7    Patient has no new complaint  Today, the patient is doing well  She denies significant abdominal pain, pelvic pain, nausea, vomiting, constipation, diarrhea, fevers, chills, or vaginal bleeding  The following portions of the patient's history were reviewed and updated as appropriate: allergies, current medications, past medical history, past social history, past surgical history and problem list     Review of Systems   Constitutional: Negative  HENT: Negative  Eyes: Negative  Respiratory: Negative  Cardiovascular: Negative  Gastrointestinal: Negative  Endocrine: Negative  Genitourinary: Negative  Musculoskeletal: Negative  Skin: Negative  Neurological: Negative  Hematological: Negative  Psychiatric/Behavioral: Negative          Current Outpatient Medications   Medication Sig Dispense Refill    fluticasone (FLONASE) 50 mcg/act nasal spray 1 spray into each nostril daily 15 8 mL 5    acetaminophen (TYLENOL) 325 mg tablet Take 3 tablets (975 mg total) by mouth every 8 (eight) hours      aspirin 325 mg tablet Take 325 mg by mouth daily      carvedilol (COREG) 12 5 mg tablet take 1 tablet by mouth twice a day with meals 180 tablet 3    cetirizine (ZyrTEC) 10 mg tablet take 1 tablet by mouth once daily 30 tablet 3    Cholecalciferol (VITAMIN D3 PO) Take 2,000 Units by mouth 2 (two) times a day      cyanocobalamin 2000 MCG tablet Take 2,000 mcg by mouth daily      docusate sodium (COLACE) 100 mg capsule Take 1 capsule (100 mg total) by mouth 2 (two) times a day 60 capsule 2    glipiZIDE (GLUCOTROL) 5 mg tablet take 1 tablet by mouth once daily 30 tablet 5    Glucose Blood (ONETOUCH TEST VI) by In Vitro route      glucose blood (OneTouch Verio) test strip Test 3 times daily 100 each 5    Januvia 100 MG tablet take 1 tablet by mouth once daily 30 tablet 3    Lancets (OneTouch Delica Plus [id number]) MISC use 1 LANCET to TEST BLOOD SUGAR three times a day 100 each 1    losartan (COZAAR) 100 MG tablet take 1 tablet by mouth once daily 90 tablet 3    metFORMIN (GLUCOPHAGE) 500 mg tablet take 2 tablets by mouth every morning and 2 tablets by mouth every evening 120 tablet 5    Multiple Vitamin-Folic Acid TABS Take by mouth      mupirocin (BACTROBAN) 2 % ointment Apply topically 3 (three) times a day for 7 days 22 g 0    nitroglycerin (NITROSTAT) 0 4 mg SL tablet Place 1 tablet (0 4 mg total) under the tongue every 5 (five) minutes as needed for chest pain 30 tablet 3    nystatin (MYCOSTATIN) cream Apply topically 2 (two) times a day 30 g 0    nystatin (MYCOSTATIN) powder Apply topically 2 (two) times a day 60 g 3    ondansetron (ZOFRAN) 4 mg tablet Take 1 tablet (4 mg total) by mouth every 8 (eight) hours as needed for nausea or vomiting 20 tablet 2    rosuvastatin (CRESTOR) 40 MG tablet take 1 tablet by mouth once daily 90 tablet 3     No current facility-administered medications for this visit             Objective:    Blood pressure 140/78, temperature 100 2 °F (37 9 °C), temperature source Tympanic, height 5' 4" (1 626 m), weight 94 8 kg (209 lb), unknown if currently breastfeeding  Body mass index is 35 87 kg/m²  Body surface area is 1 99 meters squared  Physical Exam  Constitutional:       Appearance: She is well-developed  HENT:      Head: Normocephalic and atraumatic  Neck:      Thyroid: No thyromegaly  Cardiovascular:      Rate and Rhythm: Normal rate and regular rhythm  Heart sounds: Normal heart sounds  Pulmonary:      Effort: Pulmonary effort is normal       Breath sounds: Normal breath sounds  Abdominal:      General: Bowel sounds are normal       Palpations: Abdomen is soft  Comments: Well healed laparoscopic incisions  Genitourinary:     Comments: -Normal external female genitalia, normal Bartholin's and [place]'s glands                  -Normal midline urethral meatus  No lesions notes                  -Bladder without fullness mass or tenderness                  -Vagina without lesion or discharge No significant cystocele or rectocele noted                  -Cervix surgically absent                  -Uterus surgically absent                  -Adnexae surgically absent                  - Anus without fissure of lesion    Musculoskeletal:         General: Normal range of motion  Cervical back: Normal range of motion and neck supple  Lymphadenopathy:      Cervical: No cervical adenopathy  Skin:     General: Skin is warm and dry  Neurological:      Mental Status: She is alert and oriented to person, place, and time     Psychiatric:         Behavior: Behavior normal          Lab Results   Component Value Date     6 1 01/19/2022     Lab Results   Component Value Date    K 4 5 02/24/2022     02/24/2022    CO2 25 02/24/2022    BUN 12 02/24/2022    CREATININE 0 92 02/24/2022    GLUF 182 (H) 01/27/2022    CALCIUM 9 0 02/24/2022    AST 16 01/27/2022    ALT 30 01/27/2022    ALKPHOS 95 01/27/2022    EGFR 67 02/24/2022     Lab Results   Component Value Date    WBC 12 26 (H) 02/24/2022 HGB 11 8 02/24/2022    HCT 37 9 02/24/2022    MCV 88 02/24/2022     02/24/2022     Lab Results   Component Value Date    NEUTROABS 8 28 (H) 02/24/2022        Trend:  Lab Results   Component Value Date     6 1 01/19/2022

## 2022-07-06 ENCOUNTER — OFFICE VISIT (OUTPATIENT)
Dept: INTERNAL MEDICINE CLINIC | Facility: CLINIC | Age: 62
End: 2022-07-06
Payer: COMMERCIAL

## 2022-07-06 VITALS
DIASTOLIC BLOOD PRESSURE: 86 MMHG | HEIGHT: 63 IN | SYSTOLIC BLOOD PRESSURE: 138 MMHG | HEART RATE: 92 BPM | BODY MASS INDEX: 37.49 KG/M2 | OXYGEN SATURATION: 95 % | WEIGHT: 211.6 LBS | TEMPERATURE: 97.8 F

## 2022-07-06 DIAGNOSIS — B37.2 SKIN YEAST INFECTION: ICD-10-CM

## 2022-07-06 DIAGNOSIS — Z00.00 ROUTINE ADULT HEALTH MAINTENANCE: Primary | ICD-10-CM

## 2022-07-06 DIAGNOSIS — C56.9 MALIGNANT NEOPLASM OF OVARY, UNSPECIFIED LATERALITY (HCC): ICD-10-CM

## 2022-07-06 DIAGNOSIS — I10 BENIGN ESSENTIAL HYPERTENSION: ICD-10-CM

## 2022-07-06 DIAGNOSIS — R21 RASH: ICD-10-CM

## 2022-07-06 DIAGNOSIS — I25.10 ATHEROSCLEROSIS OF NATIVE CORONARY ARTERY OF NATIVE HEART WITHOUT ANGINA PECTORIS: ICD-10-CM

## 2022-07-06 DIAGNOSIS — E11.9 TYPE 2 DIABETES MELLITUS WITHOUT COMPLICATION, WITHOUT LONG-TERM CURRENT USE OF INSULIN (HCC): ICD-10-CM

## 2022-07-06 DIAGNOSIS — E78.5 DYSLIPIDEMIA: ICD-10-CM

## 2022-07-06 DIAGNOSIS — J01.10 ACUTE NON-RECURRENT FRONTAL SINUSITIS: ICD-10-CM

## 2022-07-06 PROBLEM — D62 ACUTE BLOOD LOSS ANEMIA: Status: RESOLVED | Noted: 2022-02-10 | Resolved: 2022-07-06

## 2022-07-06 LAB — SL AMB POCT HEMOGLOBIN AIC: 7.7 (ref ?–6.5)

## 2022-07-06 PROCEDURE — 99396 PREV VISIT EST AGE 40-64: CPT | Performed by: NURSE PRACTITIONER

## 2022-07-06 PROCEDURE — 83036 HEMOGLOBIN GLYCOSYLATED A1C: CPT | Performed by: NURSE PRACTITIONER

## 2022-07-06 PROCEDURE — 3075F SYST BP GE 130 - 139MM HG: CPT | Performed by: NURSE PRACTITIONER

## 2022-07-06 PROCEDURE — 3079F DIAST BP 80-89 MM HG: CPT | Performed by: NURSE PRACTITIONER

## 2022-07-06 PROCEDURE — 3051F HG A1C>EQUAL 7.0%<8.0%: CPT | Performed by: NURSE PRACTITIONER

## 2022-07-06 RX ORDER — FLUTICASONE PROPIONATE 50 MCG
1 SPRAY, SUSPENSION (ML) NASAL DAILY
Qty: 15.8 ML | Refills: 5 | Status: SHIPPED | OUTPATIENT
Start: 2022-07-06

## 2022-07-06 RX ORDER — NYSTATIN 100000 [USP'U]/G
POWDER TOPICAL 2 TIMES DAILY
Qty: 60 G | Refills: 3 | Status: SHIPPED | OUTPATIENT
Start: 2022-07-06

## 2022-07-06 RX ORDER — NYSTATIN 100000 U/G
CREAM TOPICAL 2 TIMES DAILY
Qty: 30 G | Refills: 0 | Status: SHIPPED | OUTPATIENT
Start: 2022-07-06

## 2022-07-06 NOTE — PATIENT INSTRUCTIONS
Low Fat Diet   AMBULATORY CARE:   A low-fat diet  is an eating plan that is low in total fat, unhealthy fat, and cholesterol  You may need to follow a low-fat diet if you have trouble digesting or absorbing fat  You may also need to follow this diet if you have high cholesterol  You can also lower your cholesterol by increasing the amount of fiber in your diet  Soluble fiber is a type of fiber that helps to decrease cholesterol levels  Different types of fat in food:   · Limit unhealthy fats  A diet that is high in cholesterol, saturated fat, and trans fat may cause unhealthy cholesterol levels  Unhealthy cholesterol levels increase your risk of heart disease  ? Cholesterol:  Limit intake of cholesterol to less than 200 mg per day  Cholesterol is found in meat, eggs, and dairy  ? Saturated fat:  Limit saturated fat to less than 7% of your total daily calories  Ask your dietitian how many calories you need each day  Saturated fat is found in butter, cheese, ice cream, whole milk, and palm oil  Saturated fat is also found in meat, such as beef, pork, chicken skin, and processed meats  Processed meats include sausage, hot dogs, and bologna  ? Trans fat:  Avoid trans fat as much as possible  Trans fat is used in fried and baked foods  Foods that say trans fat free on the label may still have up to 0 5 grams of trans fat per serving  · Include healthy fats  Replace foods that are high in saturated and trans fat with foods high in healthy fats  This may help to decrease high cholesterol levels  ? Monounsaturated fats: These are found in avocados, nuts, and vegetable oils, such as olive, canola, and sunflower oil  ? Polyunsaturated fats: These can be found in vegetable oils, such as soybean or corn oil  Omega-3 fats can help to decrease the risk of heart disease  Omega-3 fats are found in fish, such as salmon, herring, trout, and tuna   Omega-3 fats can also be found in plant foods, such as walnuts, flaxseed, soybeans, and canola oil  Foods to limit or avoid:   · Grains:      ? Snacks that are made with partially hydrogenated oils, such as chips, regular crackers, and butter-flavored popcorn    ? High-fat baked goods, such as biscuits, croissants, doughnuts, pies, cookies, and pastries    · Dairy:      ? Whole milk, 2% milk, and yogurt and ice cream made with whole milk    ? Half and half creamer, heavy cream, and whipping cream    ? Cheese, cream cheese, and sour cream    · Meats and proteins:      ? High-fat cuts of meat (T-bone steak, regular hamburger, and ribs)    ? Fried meat, poultry (turkey and chicken), and fish    ? Poultry (chicken and turkey) with skin    ? Cold cuts (salami or bologna), hot dogs, molina, and sausage    ? Whole eggs and egg yolks    · Vegetables and fruits with added fat:      ? Fried vegetables or vegetables in butter or high-fat sauces, such as cream or cheese sauces    ? Fried fruit or fruit served with butter or cream    · Fats:      ? Butter, stick margarine, and shortening    ? Coconut, palm oil, and palm kernel oil    Foods to include:   · Grains:      ? Whole-grain breads, cereals, pasta, and brown rice    ? Low-fat crackers and pretzels    · Vegetables and fruits:      ? Fresh, frozen, or canned vegetables (no salt or low-sodium)    ? Fresh, frozen, dried, or canned fruit (canned in light syrup or fruit juice)    ? Avocado    · Low-fat dairy products:      ? Nonfat (skim) or 1% milk    ? Nonfat or low-fat cheese, yogurt, and cottage cheese    · Meats and proteins:      ? Chicken or turkey with no skin    ? Baked or broiled fish    ? Lean beef and pork (loin, round, extra lean hamburger)    ? Beans and peas, unsalted nuts, soy products    ? Egg whites and substitutes    ? Seeds and nuts    · Fats:      ? Unsaturated oil, such as canola, olive, peanut, soybean, or sunflower oil    ? Soft or liquid margarine and vegetable oil spread    ?  Low-fat salad dressing    Other ways to decrease fat:   · Read food labels before you buy foods  Choose foods that have less than 30% of calories from fat  Choose low-fat or fat-free dairy products  Remember that fat free does not mean calorie free  These foods still contain calories, and too many calories can lead to weight gain  · Trim fat from meat and avoid fried food  Trim all visible fat from meat before you cook it  Remove the skin from poultry  Do not ott meat, fish, or poultry  Bake, roast, boil, or broil these foods instead  Avoid fried foods  Eat a baked potato instead of [employer] fries  Steam vegetables instead of sautéing them in butter  · Add less fat to foods  Use imitation molina bits on salads and baked potatoes instead of regular molina bits  Use fat-free or low-fat salad dressings instead of regular dressings  Use low-fat or nonfat butter-flavored topping instead of regular butter or margarine on popcorn and other foods  Ways to decrease fat in recipes:  Replace high-fat ingredients with low-fat or nonfat ones  This may cause baked goods to be drier than usual  You may need to use nonfat cooking spray on pans to prevent food from sticking  You also may need to change the amount of other ingredients, such as water, in the recipe  Try the following:  · Use low-fat or light margarine instead of regular margarine or shortening  · Use lean ground turkey breast or chicken, or lean ground beef (less than 5% fat) instead of hamburger  · Add 1 teaspoon of canola oil to 8 ounces of skim milk instead of using cream or half and half  · Use grated zucchini, carrots, or apples in breads instead of coconut  · Use blenderized, low-fat cottage cheese, plain tofu, or low-fat ricotta cheese instead of cream cheese  · Use 1 egg white and 1 teaspoon of canola oil, or use ¼ cup (2 ounces) of fat-free egg substitute instead of a whole egg       · Replace half of the oil that is called for in a recipe with applesauce when you bake  Use 3 tablespoons of cocoa powder and 1 tablespoon of canola oil instead of a square of baking chocolate  How to increase fiber:  Eat enough high-fiber foods to get 20 to 30 grams of fiber every day  Slowly increase your fiber intake to avoid stomach cramps, gas, and other problems  · Eat 3 ounces of whole-grain foods each day  An ounce is about 1 slice of bread  Eat whole-grain breads, such as whole-wheat bread  Whole wheat, whole-wheat flour, or other whole grains should be listed as the first ingredient on the food label  Replace white flour with whole-grain flour or use half of each in recipes  Whole-grain flour is heavier than white flour, so you may have to add more yeast or baking powder  · Eat a high-fiber cereal for breakfast   Oatmeal is a good source of soluble fiber  Look for cereals that have bran or fiber in the name  Choose whole-grain products, such as brown rice, barley, and whole-wheat pasta  · Eat more beans, peas, and lentils  For example, add beans to soups or salads  Eat at least 5 cups of fruits and vegetables each day  Eat fruits and vegetables with the peel because the peel is high in fiber  © Copyright [employer] 2022 Information is for End User's use only and may not be sold, redistributed or otherwise used for commercial purposes  All illustrations and images included in CareNotes® are the copyrighted property of [employer] A M [employer]  or [address] [place], [place] [address]   The above information is an  only  It is not intended as medical advice for individual conditions or treatments  Talk to your doctor, nurse or pharmacist before following any medical regimen to see if it is safe and effective for you  Heart Healthy Diet   AMBULATORY CARE:   A heart healthy diet  is an eating plan low in unhealthy fats and sodium (salt)  The plan is high in healthy fats and fiber   A heart healthy diet helps improve your cholesterol levels and lowers your risk for heart disease and stroke  A dietitian will teach you how to read and understand food labels  Heart healthy diet guidelines to follow:   · Choose foods that contain healthy fats  ? Unsaturated fats  include monounsaturated and polyunsaturated fats  Unsaturated fat is found in foods such as soybean, canola, olive, corn, and safflower oils  It is also found in soft tub margarine that is made with liquid vegetable oil  ? Omega-3 fat  is found in certain fish, such as salmon, tuna, and trout, and in walnuts and flaxseed  Eat fish high in omega-3 fats at least 2 times a week  · Get 20 to 30 grams of fiber each day  Fruits, vegetables, whole-grain foods, and legumes (cooked beans) are good sources of fiber  · Limit or do not have unhealthy fats  ? Cholesterol  is found in animal foods, such as eggs and lobster, and in dairy products made from whole milk  Limit cholesterol to less than 200 mg each day  ? Saturated fat  is found in meats, such as molina and hamburger  It is also found in chicken or turkey skin, whole milk, and butter  Limit saturated fat to less than 7% of your total daily calories  ? Trans fat  is found in packaged foods, such as potato chips and cookies  It is also in hard margarine, some fried foods, and shortening  Do not eat foods that contain trans fats  · Limit sodium as directed  You may be told to limit sodium to 2,000 to 2,300 mg each day  Choose low-sodium or no-salt-added foods  Add little or no salt to food you prepare  Use herbs and spices in place of salt  Include the following in your heart healthy plan:  Ask your dietitian or healthcare provider how many servings to have from each of the following food groups:  · Grains:      ? Whole-wheat breads, cereals, and pastas, and brown rice    ? Low-fat, low-sodium crackers and chips    · Vegetables:      ? Broccoli, green beans, green peas, and spinach    ? Collards, kale, and lima beans    ?  Carrots, sweet potatoes, tomatoes, and peppers    ? Canned vegetables with no salt added    · Fruits:      ? Bananas, peaches, pears, and pineapple    ? Grapes, raisins, and dates    ? Oranges, tangerines, grapefruit, orange juice, and grapefruit juice    ? Apricots, mangoes, melons, and papaya    ? Raspberries and strawberries    ? Canned fruit with no added sugar    · Low-fat dairy:      ? Nonfat (skim) milk, 1% milk, and low-fat almond, cashew, or soy milks fortified with calcium    ? Low-fat cheese, regular or frozen yogurt, and cottage cheese    · Meats and proteins:      ? Lean cuts of beef and pork (loin, leg, round), skinless chicken and turkey    ? Legumes, soy products, egg whites, or nuts    Limit or do not include the following in your heart healthy plan:   · Unhealthy fats and oils:      ? Whole or 2% milk, cream cheese, sour cream, or cheese    ? High-fat cuts of beef (T-bone steaks, ribs), chicken or turkey with skin, and organ meats such as liver    ? Butter, stick margarine, shortening, and cooking oils such as coconut or palm oil    · Foods and liquids high in sodium:      ? Packaged foods, such as frozen dinners, cookies, macaroni and cheese, and cereals with more than 300 mg of sodium per serving    ? Vegetables with added sodium, such as instant potatoes, vegetables with added sauces, or regular canned vegetables    ? Cured or smoked meats, such as hot dogs, molina, and sausage    ? High-sodium ketchup, barbecue sauce, salad dressing, pickles, olives, soy sauce, or miso    · Foods and liquids high in sugar:      ? Candy, cake, cookies, pies, or doughnuts    ? Soft drinks (soda), sports drinks, or sweetened tea    ? Canned or dry mixes for cakes, soups, sauces, or gravies    Other healthy heart guidelines:   · Do not smoke  Nicotine and other chemicals in cigarettes and cigars can cause lung and heart damage  Ask your healthcare provider for information if you currently smoke and need help to quit  E-cigarettes or smokeless tobacco still contain nicotine  Talk to your healthcare provider before you use these products  · Limit or do not drink alcohol as directed  Alcohol can damage your heart and raise your blood pressure  Your healthcare provider may give you specific daily and weekly limits  The general recommended limit is 1 drink a day for women 21 or older and for men 72 or older  Do not have more than 3 drinks in a day or 7 in a week  The recommended limit is 2 drinks a day for men 24to 59years of age  Do not have more than 4 drinks in a day or 14 in a week  A drink of alcohol is 12 ounces of beer, 5 ounces of wine, or 1½ ounces of liquor  · Exercise regularly  Exercise can help you maintain a healthy weight and improve your blood pressure and cholesterol levels  Regular exercise can also decrease your risk for heart problems  Ask your healthcare provider about the best exercise plan for you  Do not start an exercise program without asking your healthcare provider  Follow up with your doctor or cardiologist as directed:  Write down your questions so you remember to ask them during your visits  © Copyright [employer] 2022 Information is for End User's use only and may not be sold, redistributed or otherwise used for commercial purposes  All illustrations and images included in CareNotes® are the copyrighted property of [employer]  or [facility] Katelin Ravi   The above information is an  only  It is not intended as medical advice for individual conditions or treatments  Talk to your doctor, nurse or pharmacist before following any medical regimen to see if it is safe and effective for you

## 2022-07-06 NOTE — PROGRESS NOTES
Assessment/Plan: A1C is at 7 7 continue same medications and continue to monitor sugars  Foot exam is normal  Will have eye exam  Continues to follow up with GYN/ONC  Continues to see Cardiology  Will repeat fasting labs  Did have covid vaccines with first booster  Will bring her back in 6 months or sooner if need be  No problem-specific Assessment & Plan notes found for this encounter           Problem List Items Addressed This Visit        Endocrine    Type 2 diabetes mellitus without complication, without long-term current use of insulin (HCC)    Relevant Orders    POCT hemoglobin A1c (Completed)    Comprehensive metabolic panel    CBC and differential    TSH, 3rd generation with Free T4 reflex    Ovarian cancer (HCC)    Relevant Orders    Comprehensive metabolic panel    CBC and differential    TSH, 3rd generation with Free T4 reflex       Respiratory    Acute non-recurrent frontal sinusitis    Relevant Medications    fluticasone (FLONASE) 50 mcg/act nasal spray    Other Relevant Orders    Comprehensive metabolic panel    CBC and differential    TSH, 3rd generation with Free T4 reflex       Cardiovascular and Mediastinum    Atherosclerotic heart disease of native coronary artery without angina pectoris    Relevant Orders    Comprehensive metabolic panel    CBC and differential    TSH, 3rd generation with Free T4 reflex    Benign essential hypertension    Relevant Orders    Comprehensive metabolic panel    CBC and differential    TSH, 3rd generation with Free T4 reflex       Musculoskeletal and Integument    Skin yeast infection    Relevant Medications    nystatin (MYCOSTATIN) cream    nystatin (MYCOSTATIN) powder    Other Relevant Orders    Comprehensive metabolic panel    CBC and differential    TSH, 3rd generation with Free T4 reflex    Rash    Relevant Medications    nystatin (MYCOSTATIN) cream    nystatin (MYCOSTATIN) powder    Other Relevant Orders    Comprehensive metabolic panel    CBC and differential TSH, 3rd generation with Free T4 reflex       Other    Dyslipidemia    Relevant Orders    Comprehensive metabolic panel    CBC and differential    TSH, 3rd generation with Free T4 reflex    Lipid panel    Routine adult health maintenance - Primary    Relevant Orders    Comprehensive metabolic panel    CBC and differential    TSH, 3rd generation with Free T4 reflex    BMI 37 0-37 9, adult    Relevant Orders    Comprehensive metabolic panel    CBC and differential    TSH, 3rd generation with Free T4 reflex            Subjective:      Patient ID: Ivanna Nicole is a 64 y o  female  Raquel Mora is for a routine wellness  She is following up with GYN/ONC for ovarian cancer  She did have a total hyster in February  She does see them every 3 months and everything has been stable  She is checking her sugars and everything has been stable  She is due for an eye exam  She is up to date on her other screenings  She will be due for her routine labs  She is following up with Cardiology in the upcoming months  She is having hot flashes from her hyster and having covid back in May  She offers no other issues  The following portions of the patient's history were reviewed and updated as appropriate: She  has a past medical history of Cardiac disease, Chronic neck pain, Diabetes mellitus ([address] ), Hyperlipidemia, Hypertension, Left bundle branch block, Mild depression, and Tonsillar hypertrophy    She   Patient Active Problem List    Diagnosis Date Noted    Routine adult health maintenance 07/06/2022    Skin yeast infection 07/06/2022    Rash 07/06/2022    BMI 37 0-37 9, adult 07/06/2022    Acute non-recurrent frontal sinusitis 07/06/2022    Ovarian cancer ([facility] ) 03/07/2022    Postoperative state 02/10/2022    Pre-op evaluation 02/01/2022    Fibroid uterus 02/01/2022    Adnexal mass 02/01/2022    LLQ pain 12/14/2021    Acute bilateral low back pain with bilateral sciatica 05/26/2021    Body mass index (BMI) 36 0-36 9, adult 05/26/2021    Presence of drug coated stent in right coronary artery 06/05/2018    Left bundle branch block (LBBB) 06/05/2018    Tobacco abuse 06/05/2018    Leukocytosis 04/24/2018    Type 2 diabetes mellitus without complication, without long-term current use of insulin ([facility] [address] ) 02/28/2017    Obesity 08/09/2013    Anxiety 06/03/2013    Atherosclerotic heart disease of native coronary artery without angina pectoris 06/03/2013    Dyslipidemia 06/03/2013    Benign essential hypertension 05/13/2013     She  has a past surgical history that includes Cardiac surgery; Coronary angioplasty with stent (05/28/2013); and pr total abdom hysterectomy (N/A, 2/9/2022)  Her family history includes Brantingham's disease in her brother; Alcohol abuse in her brother; Arthritis in her father and mother; Bipolar disorder in her brother; Breast cancer in her cousin and maternal aunt; Cardiomyopathy in her mother; Cerebral aneurysm in her brother; Colon cancer (age of onset: 66) in her mother; Coronary artery disease in her father and mother; Diabetes type II in her father and maternal uncle; Fibrocystic breast disease in her mother; Fibroids in her paternal grandmother; Gout in her father; Hyperlipidemia in her father; Hypertension in her mother and sister; Hypothyroidism in her sister; Kidney disease in her father; Lung cancer in her maternal uncle; Other in her brother and sister; Peripheral vascular disease in her maternal aunt; Prostate cancer in her father; Rheum arthritis in her sister; Stroke in her father; Thrombocytopenia in her daughter  She  reports that she has been smoking cigarettes  She has a 10 00 pack-year smoking history  She has never used smokeless tobacco  She reports that she does not drink alcohol and does not use drugs    Current Outpatient Medications   Medication Sig Dispense Refill    acetaminophen (TYLENOL) 325 mg tablet Take 3 tablets (975 mg total) by mouth every 8 (eight) hours      aspirin 325 mg tablet Take 325 mg by mouth daily      carvedilol (COREG) 12 5 mg tablet take 1 tablet by mouth twice a day with meals 180 tablet 3    cetirizine (ZyrTEC) 10 mg tablet take 1 tablet by mouth once daily 30 tablet 3    Cholecalciferol (VITAMIN D3 PO) Take 2,000 Units by mouth 2 (two) times a day      cyanocobalamin 2000 MCG tablet Take 2,000 mcg by mouth daily      fluticasone (FLONASE) 50 mcg/act nasal spray 1 spray into each nostril daily 15 8 mL 5    glipiZIDE (GLUCOTROL) 5 mg tablet take 1 tablet by mouth once daily 30 tablet 5    Glucose Blood (ONETOUCH TEST VI) by In Vitro route      glucose blood (OneTouch Verio) test strip Test 3 times daily 100 each 5    Januvia 100 MG tablet take 1 tablet by mouth once daily 30 tablet 3    Lancets (OneTouch Delica Plus [id number]) MISC use 1 LANCET to TEST BLOOD SUGAR three times a day 100 each 1    losartan (COZAAR) 100 MG tablet take 1 tablet by mouth once daily 90 tablet 3    metFORMIN (GLUCOPHAGE) 500 mg tablet take 2 tablets by mouth every morning and 2 tablets by mouth every evening 120 tablet 5    Multiple Vitamin-Folic Acid TABS Take by mouth      nitroglycerin (NITROSTAT) 0 4 mg SL tablet Place 1 tablet (0 4 mg total) under the tongue every 5 (five) minutes as needed for chest pain 30 tablet 3    nystatin (MYCOSTATIN) cream Apply topically 2 (two) times a day 30 g 0    nystatin (MYCOSTATIN) powder Apply topically 2 (two) times a day 60 g 3    ondansetron (ZOFRAN) 4 mg tablet Take 1 tablet (4 mg total) by mouth every 8 (eight) hours as needed for nausea or vomiting 20 tablet 2    rosuvastatin (CRESTOR) 40 MG tablet take 1 tablet by mouth once daily 90 tablet 3    docusate sodium (COLACE) 100 mg capsule Take 1 capsule (100 mg total) by mouth 2 (two) times a day (Patient not taking: Reported on 7/6/2022) 60 capsule 2    mupirocin (BACTROBAN) 2 % ointment Apply topically 3 (three) times a day for 7 days (Patient not taking: Reported on 7/6/2022) 22 g 0     No current facility-administered medications for this visit       Current Outpatient Medications on File Prior to Visit   Medication Sig    acetaminophen (TYLENOL) 325 mg tablet Take 3 tablets (975 mg total) by mouth every 8 (eight) hours    aspirin 325 mg tablet Take 325 mg by mouth daily    carvedilol (COREG) 12 5 mg tablet take 1 tablet by mouth twice a day with meals    cetirizine (ZyrTEC) 10 mg tablet take 1 tablet by mouth once daily    Cholecalciferol (VITAMIN D3 PO) Take 2,000 Units by mouth 2 (two) times a day    cyanocobalamin 2000 MCG tablet Take 2,000 mcg by mouth daily    glipiZIDE (GLUCOTROL) 5 mg tablet take 1 tablet by mouth once daily    Glucose Blood (ONETOUCH TEST VI) by In Vitro route    glucose blood (OneTouch Verio) test strip Test 3 times daily    Januvia 100 MG tablet take 1 tablet by mouth once daily    Lancets (OneTouch Delica Plus [id number]) MISC use 1 LANCET to TEST BLOOD SUGAR three times a day    losartan (COZAAR) 100 MG tablet take 1 tablet by mouth once daily    metFORMIN (GLUCOPHAGE) 500 mg tablet take 2 tablets by mouth every morning and 2 tablets by mouth every evening    Multiple Vitamin-Folic Acid TABS Take by mouth    nitroglycerin (NITROSTAT) 0 4 mg SL tablet Place 1 tablet (0 4 mg total) under the tongue every 5 (five) minutes as needed for chest pain    ondansetron (ZOFRAN) 4 mg tablet Take 1 tablet (4 mg total) by mouth every 8 (eight) hours as needed for nausea or vomiting    rosuvastatin (CRESTOR) 40 MG tablet take 1 tablet by mouth once daily    [DISCONTINUED] fluticasone (FLONASE) 50 mcg/act nasal spray 1 spray into each nostril daily    [DISCONTINUED] nystatin (MYCOSTATIN) cream Apply topically 2 (two) times a day    [DISCONTINUED] nystatin (MYCOSTATIN) powder Apply topically 2 (two) times a day    docusate sodium (COLACE) 100 mg capsule Take 1 capsule (100 mg total) by mouth 2 (two) times a day (Patient not taking: Reported on 7/6/2022)    mupirocin (BACTROBAN) 2 % ointment Apply topically 3 (three) times a day for 7 days (Patient not taking: Reported on 7/6/2022)     No current facility-administered medications on file prior to visit  She is allergic to hctz [hydrochlorothiazide], wellbutrin [bupropion], and ace inhibitors       Review of Systems   All other systems reviewed and are negative  Patient's shoes and socks removed  Right Foot/Ankle   Right Foot Inspection  Skin Exam: skin normal and skin intact  No dry skin, no warmth, no callus, no erythema, no maceration, no abnormal color, no pre-ulcer, no ulcer and no callus  Toe Exam: ROM and strength within normal limits  Sensory   Vibration: intact  Proprioception: intact  Monofilament testing: intact    Vascular  Capillary refills: < 3 seconds  The right DP pulse is 2+  The right PT pulse is 2+  Left Foot/Ankle  Left Foot Inspection  Skin Exam: skin normal and skin intact  No dry skin, no warmth, no erythema, no maceration, normal color, no pre-ulcer, no ulcer and no callus  Toe Exam: ROM and strength within normal limits  Sensory   Vibration: intact  Proprioception: intact  Monofilament testing: intact    Vascular  The left DP pulse is 2+  The left PT pulse is 2+  Assign Risk Category  No deformity present  No loss of protective sensation  No weak pulses  Risk: 0        Objective:      /86 (BP Location: Right arm, Patient Position: Sitting, Cuff Size: Large)   Pulse 92   Temp 97 8 °F (36 6 °C)   Ht 5' 3" (1 6 m)   Wt 96 kg (211 lb 9 6 oz)   SpO2 95%   BMI 37 48 kg/m²          Physical Exam  Vitals reviewed  Constitutional:       Appearance: Normal appearance  She is normal weight  HENT:      Head: Normocephalic and atraumatic        Right Ear: Tympanic membrane, ear canal and external ear normal       Left Ear: Ear canal and external ear normal       Nose: Nose normal       Mouth/Throat:      Mouth: Mucous membranes are moist       Pharynx: Oropharynx is clear  Eyes:      Extraocular Movements: Extraocular movements intact  Conjunctiva/sclera: Conjunctivae normal       Pupils: Pupils are equal, round, and reactive to light  Cardiovascular:      Rate and Rhythm: Normal rate and regular rhythm  Pulses: Normal pulses  no weak pulses          Dorsalis pedis pulses are 2+ on the right side and 2+ on the left side  Posterior tibial pulses are 2+ on the right side and 2+ on the left side  Heart sounds: Normal heart sounds  Pulmonary:      Effort: Pulmonary effort is normal       Breath sounds: Normal breath sounds  Abdominal:      General: Abdomen is flat  Bowel sounds are normal       Palpations: Abdomen is soft  Musculoskeletal:         General: Normal range of motion  Cervical back: Normal range of motion and neck supple  Feet:    Feet:      Right foot:      Skin integrity: No ulcer, skin breakdown, erythema, warmth, callus or dry skin  Left foot:      Skin integrity: No ulcer, skin breakdown, erythema, warmth, callus or dry skin  Skin:     General: Skin is warm and dry  Capillary Refill: Capillary refill takes less than 2 seconds  Neurological:      General: No focal deficit present  Mental Status: She is alert and oriented to person, place, and time  Mental status is at baseline  Psychiatric:         Mood and Affect: Mood normal          Behavior: Behavior normal          Thought Content: Thought content normal          Judgment: Judgment normal          BMI Counseling: Body mass index is 37 48 kg/m²   The BMI is above normal  Nutrition recommendations include reducing portion sizes, decreasing overall calorie intake, 3-5 servings of fruits/vegetables daily, reducing fast food intake, consuming healthier snacks, decreasing soda and/or juice intake, moderation in carbohydrate intake, increasing intake of lean protein, reducing intake of saturated fat and trans fat and reducing intake of cholesterol

## 2022-07-15 DIAGNOSIS — E11.9 TYPE 2 DIABETES MELLITUS WITHOUT COMPLICATION, WITHOUT LONG-TERM CURRENT USE OF INSULIN (HCC): ICD-10-CM

## 2022-07-15 RX ORDER — LANCETS 33 GAUGE
EACH MISCELLANEOUS
Qty: 100 EACH | Refills: 2 | Status: SHIPPED | OUTPATIENT
Start: 2022-07-15

## 2022-07-15 RX ORDER — BLOOD SUGAR DIAGNOSTIC
STRIP MISCELLANEOUS
Qty: 100 STRIP | Refills: 0 | Status: SHIPPED | OUTPATIENT
Start: 2022-07-15

## 2022-08-12 NOTE — TELEPHONE ENCOUNTER
Called Fabiola Membreno and carly to return call 
Called Hortencia Smith and lmom to return call to review 
Patient is now taking crestor can you make sure she is not trying to fill the wrong medication   She was started on this by her Cardiologist
Medical Decision Making

## 2022-08-18 DIAGNOSIS — E11.9 TYPE 2 DIABETES MELLITUS WITHOUT COMPLICATION, WITHOUT LONG-TERM CURRENT USE OF INSULIN (HCC): ICD-10-CM

## 2022-08-19 RX ORDER — SITAGLIPTIN 100 MG/1
100 TABLET, FILM COATED ORAL DAILY
Qty: 30 TABLET | Refills: 0 | Status: SHIPPED | OUTPATIENT
Start: 2022-08-19 | End: 2022-10-15

## 2022-09-02 ENCOUNTER — APPOINTMENT (OUTPATIENT)
Dept: LAB | Facility: HOSPITAL | Age: 62
End: 2022-09-02
Payer: COMMERCIAL

## 2022-09-02 DIAGNOSIS — C56.9 MALIGNANT NEOPLASM OF OVARY, UNSPECIFIED LATERALITY (HCC): ICD-10-CM

## 2022-09-02 PROCEDURE — 86336 INHIBIN A: CPT

## 2022-09-02 PROCEDURE — 83520 IMMUNOASSAY QUANT NOS NONAB: CPT

## 2022-09-06 LAB — INHIBIN A SERPL-MCNC: 3 PG/ML

## 2022-09-07 DIAGNOSIS — H57.89 EYE DRAINAGE: ICD-10-CM

## 2022-09-07 DIAGNOSIS — H04.129 DRY EYE: ICD-10-CM

## 2022-09-07 LAB — INHIBIN B SERPL-MCNC: <7 PG/ML (ref 0–16.9)

## 2022-09-08 RX ORDER — CETIRIZINE HYDROCHLORIDE 10 MG/1
10 TABLET ORAL DAILY
Qty: 30 TABLET | Refills: 0 | Status: SHIPPED | OUTPATIENT
Start: 2022-09-08

## 2022-09-11 DIAGNOSIS — E11.00 TYPE 2 DIABETES MELLITUS WITH HYPEROSMOLARITY WITHOUT COMA, WITHOUT LONG-TERM CURRENT USE OF INSULIN (HCC): ICD-10-CM

## 2022-09-11 RX ORDER — GLIPIZIDE 5 MG/1
TABLET ORAL
Qty: 30 TABLET | Refills: 5 | Status: SHIPPED | OUTPATIENT
Start: 2022-09-11

## 2022-09-12 ENCOUNTER — OFFICE VISIT (OUTPATIENT)
Dept: GYNECOLOGIC ONCOLOGY | Facility: CLINIC | Age: 62
End: 2022-09-12
Payer: COMMERCIAL

## 2022-09-12 VITALS
HEIGHT: 63 IN | BODY MASS INDEX: 37.74 KG/M2 | SYSTOLIC BLOOD PRESSURE: 130 MMHG | TEMPERATURE: 98.6 F | WEIGHT: 213 LBS | DIASTOLIC BLOOD PRESSURE: 80 MMHG

## 2022-09-12 DIAGNOSIS — C56.9 MALIGNANT NEOPLASM OF OVARY, UNSPECIFIED LATERALITY (HCC): Primary | ICD-10-CM

## 2022-09-12 PROCEDURE — 3079F DIAST BP 80-89 MM HG: CPT | Performed by: OBSTETRICS & GYNECOLOGY

## 2022-09-12 PROCEDURE — 3075F SYST BP GE 130 - 139MM HG: CPT | Performed by: OBSTETRICS & GYNECOLOGY

## 2022-09-12 PROCEDURE — 99213 OFFICE O/P EST LOW 20 MIN: CPT | Performed by: OBSTETRICS & GYNECOLOGY

## 2022-09-12 NOTE — ASSESSMENT & PLAN NOTE
Patient remains stable without evidence of recurrence of disease  We will see her back in 3 months for repeat evaluation with inhibin a and B

## 2022-09-12 NOTE — PROGRESS NOTES
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      Problem:  Cancer Staging  Ovarian cancer ([facility] )  Staging form: Ovary, Fallopian Tube, Primary Peritoneal, AJCC 8th Edition  - Clinical: FIGO Stage IC2, calculated as Stage IC (cT1c2, cN0, cM0) - Signed by Pepe Boyle MD on 3/7/2022  - Pathologic stage from 3/7/2022: FIGO Stage IC2, calculated as Stage Unknown (pT1c2, pNX, cM0) - Signed by Pepe Boyle MD on 3/7/2022        Previous therapy:  Oncology History   Ovarian cancer ([facility] )   12/2021 Surgery    TAHBSO for fibroid uterus of 24 weeks with an incidental finding of a 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  Surgery performed by Micky Gilford     3/7/2022 Initial Diagnosis    Ovarian cancer ([facility] )     3/7/2022 -  Cancer Staged    Staging form: Ovary, Fallopian Tube, Primary Peritoneal, AJCC 8th Edition  - Clinical: FIGO Stage IC2, calculated as Stage IC (cT1c2, cN0, cM0) - Signed by Pepe Boyle MD on 3/7/2022       3/7/2022 -  Cancer Staged    Staging form: Ovary, Fallopian Tube, Primary Peritoneal, AJCC 8th Edition  - Pathologic stage from 3/7/2022: FIGO Stage IC2, calculated as Stage Unknown (pT1c2, pNX, cM0) - Signed by Pepe Boyle MD on 3/7/2022  Histopathologic type: Granulosa cell tumor, adult type  Stage prefix: Initial diagnosis  Histologic grade (G): G1  Histologic grading system: 4 grade system  Residual tumor (R): R0 - None             Patient ID: Matthew Knox is a 64 y o  female  Patient is very pleasant 70-year-old female with history of stage I C2 adult granulosa cell tumor of the ovary    She underwent surgery alone and presents for follow-up  Her initial procedure was in December of 2021  Since that time the patient has been without evidence of recurrence of disease  The patient's most recent inhibin levels are stable inhibin B is less than 7  Inhibin a is 3  This is slightly up from 2 6 last visit  Today, the patient is doing well  She denies significant abdominal pain, pelvic pain, nausea, vomiting, constipation, diarrhea, fevers, chills, or vaginal bleeding         The following portions of the patient's history were reviewed and updated as appropriate: allergies, current medications, past medical history, past social history, past surgical history and problem list     Review of Systems    Current Outpatient Medications   Medication Sig Dispense Refill    acetaminophen (TYLENOL) 325 mg tablet Take 3 tablets (975 mg total) by mouth every 8 (eight) hours      aspirin 325 mg tablet Take 325 mg by mouth daily      carvedilol (COREG) 12 5 mg tablet take 1 tablet by mouth twice a day with meals 180 tablet 3    cetirizine (ZyrTEC) 10 mg tablet Take 1 tablet (10 mg total) by mouth daily 30 tablet 0    Cholecalciferol (VITAMIN D3 PO) Take 2,000 Units by mouth 2 (two) times a day      cyanocobalamin 2000 MCG tablet Take 2,000 mcg by mouth daily      docusate sodium (COLACE) 100 mg capsule Take 1 capsule (100 mg total) by mouth 2 (two) times a day (Patient not taking: Reported on 7/6/2022) 60 capsule 2    fluticasone (FLONASE) 50 mcg/act nasal spray 1 spray into each nostril daily 15 8 mL 5    glipiZIDE (GLUCOTROL) 5 mg tablet take 1 tablet by mouth once daily 30 tablet 5    Glucose Blood (ONETOUCH TEST VI) by In Vitro route      glucose blood (OneTouch Verio) test strip use 1 TEST STRIP to TEST BLOOD SUGAR three times a day 100 strip 0    Januvia 100 MG tablet Take 1 tablet (100 mg total) by mouth daily 30 tablet 0    Lancets (OneTouch Delica Plus [id number]) MISC use 1 LANCET to TEST BLOOD SUGAR three times a day 100 each 2    losartan (COZAAR) 100 MG tablet take 1 tablet by mouth once daily 90 tablet 3    metFORMIN (GLUCOPHAGE) 500 mg tablet take 2 tablets by mouth every morning and 2 tablets by mouth every evening 120 tablet 5    Multiple Vitamin-Folic Acid TABS Take by mouth      mupirocin (BACTROBAN) 2 % ointment Apply topically 3 (three) times a day for 7 days (Patient not taking: Reported on 7/6/2022) 22 g 0    nitroglycerin (NITROSTAT) 0 4 mg SL tablet Place 1 tablet (0 4 mg total) under the tongue every 5 (five) minutes as needed for chest pain 30 tablet 3    nystatin (MYCOSTATIN) cream Apply topically 2 (two) times a day 30 g 0    nystatin (MYCOSTATIN) powder Apply topically 2 (two) times a day 60 g 3    ondansetron (ZOFRAN) 4 mg tablet Take 1 tablet (4 mg total) by mouth every 8 (eight) hours as needed for nausea or vomiting 20 tablet 2    rosuvastatin (CRESTOR) 40 MG tablet take 1 tablet by mouth once daily 90 tablet 3     No current facility-administered medications for this visit  Objective:    Blood pressure 130/80, temperature 98 6 °F (37 °C), temperature source Tympanic, height 5' 3" (1 6 m), weight 96 6 kg (213 lb), unknown if currently breastfeeding  Body mass index is 37 73 kg/m²  Body surface area is 1 99 meters squared      Physical Exam    Lab Results   Component Value Date     6 1 01/19/2022     Lab Results   Component Value Date    K 4 5 02/24/2022     02/24/2022    CO2 25 02/24/2022    BUN 12 02/24/2022    CREATININE 0 92 02/24/2022    GLUF 182 (H) 01/27/2022    CALCIUM 9 0 02/24/2022    AST 16 01/27/2022    ALT 30 01/27/2022    ALKPHOS 95 01/27/2022    EGFR 67 02/24/2022     Lab Results   Component Value Date    WBC 12 26 (H) 02/24/2022    HGB 11 8 02/24/2022    HCT 37 9 02/24/2022    MCV 88 02/24/2022     02/24/2022     Lab Results   Component Value Date    NEUTROABS 8 28 (H) 02/24/2022        Trend:  Lab Results   Component Value Date     6 1 01/19/2022

## 2022-09-28 ENCOUNTER — VBI (OUTPATIENT)
Dept: ADMINISTRATIVE | Facility: OTHER | Age: 62
End: 2022-09-28

## 2022-10-11 PROBLEM — J01.10 ACUTE NON-RECURRENT FRONTAL SINUSITIS: Status: RESOLVED | Noted: 2022-07-06 | Resolved: 2022-10-11

## 2022-10-11 PROBLEM — Z00.00 ROUTINE ADULT HEALTH MAINTENANCE: Status: RESOLVED | Noted: 2022-07-06 | Resolved: 2022-10-11

## 2022-10-12 ENCOUNTER — IMMUNIZATIONS (OUTPATIENT)
Dept: INTERNAL MEDICINE CLINIC | Facility: CLINIC | Age: 62
End: 2022-10-12
Payer: COMMERCIAL

## 2022-10-12 DIAGNOSIS — Z23 NEED FOR INFLUENZA VACCINATION: Primary | ICD-10-CM

## 2022-10-12 PROCEDURE — 90471 IMMUNIZATION ADMIN: CPT

## 2022-10-12 PROCEDURE — 90662 IIV NO PRSV INCREASED AG IM: CPT

## 2022-10-15 DIAGNOSIS — E11.00 TYPE 2 DIABETES MELLITUS WITH HYPEROSMOLARITY WITHOUT COMA, WITHOUT LONG-TERM CURRENT USE OF INSULIN (HCC): ICD-10-CM

## 2022-10-15 DIAGNOSIS — E11.9 TYPE 2 DIABETES MELLITUS WITHOUT COMPLICATION, WITHOUT LONG-TERM CURRENT USE OF INSULIN (HCC): ICD-10-CM

## 2022-10-15 RX ORDER — SITAGLIPTIN 100 MG/1
TABLET, FILM COATED ORAL
Qty: 30 TABLET | Refills: 0 | Status: SHIPPED | OUTPATIENT
Start: 2022-10-15

## 2022-11-07 DIAGNOSIS — H57.89 EYE DRAINAGE: ICD-10-CM

## 2022-11-07 DIAGNOSIS — H04.129 DRY EYE: ICD-10-CM

## 2022-11-08 RX ORDER — CETIRIZINE HYDROCHLORIDE 10 MG/1
10 TABLET ORAL DAILY
Qty: 30 TABLET | Refills: 0 | Status: SHIPPED | OUTPATIENT
Start: 2022-11-08

## 2022-11-13 DIAGNOSIS — E11.9 TYPE 2 DIABETES MELLITUS WITHOUT COMPLICATION, WITHOUT LONG-TERM CURRENT USE OF INSULIN (HCC): ICD-10-CM

## 2022-11-13 RX ORDER — SITAGLIPTIN 100 MG/1
TABLET, FILM COATED ORAL
Qty: 30 TABLET | Refills: 0 | Status: SHIPPED | OUTPATIENT
Start: 2022-11-13

## 2022-12-05 ENCOUNTER — APPOINTMENT (OUTPATIENT)
Dept: LAB | Facility: HOSPITAL | Age: 62
End: 2022-12-05

## 2022-12-05 DIAGNOSIS — C56.9 MALIGNANT NEOPLASM OF OVARY, UNSPECIFIED LATERALITY (HCC): ICD-10-CM

## 2022-12-07 LAB — INHIBIN B SERPL-MCNC: <7 PG/ML (ref 0–16.9)

## 2022-12-08 LAB — INHIBIN A SERPL-MCNC: 3.4 PG/ML

## 2022-12-12 ENCOUNTER — OFFICE VISIT (OUTPATIENT)
Dept: GYNECOLOGIC ONCOLOGY | Facility: CLINIC | Age: 62
End: 2022-12-12

## 2022-12-12 VITALS
SYSTOLIC BLOOD PRESSURE: 162 MMHG | HEIGHT: 63 IN | DIASTOLIC BLOOD PRESSURE: 74 MMHG | TEMPERATURE: 98.8 F | OXYGEN SATURATION: 97 % | BODY MASS INDEX: 37.39 KG/M2 | HEART RATE: 96 BPM | WEIGHT: 211 LBS

## 2022-12-12 DIAGNOSIS — C56.9 MALIGNANT NEOPLASM OF OVARY, UNSPECIFIED LATERALITY (HCC): Primary | ICD-10-CM

## 2022-12-12 NOTE — ASSESSMENT & PLAN NOTE
Patient is very pleasant 58-year-old female with an incidentally identified granulosa cell tumor of the ovary  She remains in clinical remission  Her inhibin level is slightly increased from last time but still within the normal range  We have recommended no further intervention at this time  The patient will follow-up in 3 months and repeat inhibin levels will be drawn

## 2022-12-12 NOTE — PROGRESS NOTES
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  She remains in clinical remission  Her inhibin level is slightly increased from last time but still within the normal range  We have recommended no further intervention at this time  The patient will follow-up in 3 months and repeat inhibin levels will be drawn  Relevant Orders    Inhibin A    Inhibin B         CHIEF COMPLAINT:       Problem:  Cancer Staging   Ovarian cancer ([facility] [address] )  Staging form: Ovary, Fallopian Tube, Primary Peritoneal, AJCC 8th Edition  - Clinical: FIGO Stage IC2, calculated as Stage IC (cT1c2, cN0, cM0) - Signed by Marvin Cedeno MD on 3/7/2022  - Pathologic stage from 3/7/2022: FIGO Stage IC2, calculated as Stage Unknown (pT1c2, pNX, cM0) - Signed by Marvin Cedeno MD on 3/7/2022        Previous therapy:  Oncology History   Ovarian cancer ([facility] [address] )   12/2021 Surgery    TAHBSO for fibroid uterus of 24 weeks with an incidental finding of a 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  Surgery performed by Blaine Kehr     3/7/2022 Initial Diagnosis    Ovarian [facility] ([facility] )     3/7/2022 -  Cancer Staged    Staging form: Ovary, Fallopian Tube, Primary Peritoneal, AJCC 8th Edition  - Clinical: FIGO Stage IC2, calculated as Stage IC (cT1c2, cN0, cM0) - Signed by Marvin Cedeno MD on 3/7/2022       3/7/2022 -  Cancer Staged    Staging form: Ovary, Fallopian Tube, Primary Peritoneal, AJCC 8th Edition  - Pathologic stage from 3/7/2022:  FIGO Stage IC2, calculated as Stage Unknown (pT1c2, pNX, cM0) - Signed by Marvin Ceedno MD on 3/7/2022  Histopathologic type: Granulosa cell tumor, adult type  Stage prefix: Initial diagnosis  Histologic grade (G): G1  Histologic grading system: 4 grade system  Residual tumor (R): R0 - None             Patient ID: Aminta Tam is a 58 y o  female  Patient is very pleasant 42-year-old female with history of an incidentally identified 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 at the time of hysterectomy for for large 24 week fibroid uterus  The patient was treated with surgery alone approximately 1 year ago  Since that time patient has been stable  Her inhibin a level remains less than 7  Her inhibin B level is slightly increased to 3 4 from approximally 2 8  The patient remains asymptomatic and presents today in follow-up  Today, the patient is doing well  She denies significant abdominal pain, pelvic pain, nausea, vomiting, constipation, diarrhea, fevers, chills, or vaginal bleeding  The following portions of the patient's history were reviewed and updated as appropriate: allergies, current medications, past family history, past social history, past surgical history and problem list     Review of Systems   Constitutional: Negative  HENT: Negative  Eyes: Negative  Respiratory: Negative  Cardiovascular: Negative  Gastrointestinal: Negative  Endocrine: Negative  Genitourinary: Negative  Musculoskeletal: Negative  Skin: Negative  Neurological: Negative  Hematological: Negative  Psychiatric/Behavioral: Negative          Current Outpatient Medications   Medication Sig Dispense Refill   • acetaminophen (TYLENOL) 325 mg tablet Take 3 tablets (975 mg total) by mouth every 8 (eight) hours     • aspirin 325 mg tablet Take 325 mg by mouth daily     • carvedilol (COREG) 12 5 mg tablet take 1 tablet by mouth twice a day with meals 180 tablet 3   • cetirizine (ZyrTEC) 10 mg tablet Take 1 tablet (10 mg total) by mouth daily 30 tablet 0   • Cholecalciferol (VITAMIN D3 PO) Take 2,000 Units by mouth 2 (two) times a day     • cyanocobalamin 2000 MCG tablet Take 2,000 mcg by mouth daily     • fluticasone (FLONASE) 50 mcg/act nasal spray 1 spray into each nostril daily 15 8 mL 5   • glipiZIDE (GLUCOTROL) 5 mg tablet take 1 tablet by mouth once daily 30 tablet 5   • Glucose Blood (ONETOUCH TEST VI) by In Vitro route     • glucose blood (OneTouch Verio) test strip use 1 TEST STRIP to TEST BLOOD SUGAR three times a day 100 strip 0   • Januvia 100 MG tablet take 1 tablet by mouth once daily 30 tablet 0   • losartan (COZAAR) 100 MG tablet take 1 tablet by mouth once daily 90 tablet 3   • metFORMIN (GLUCOPHAGE) 500 mg tablet take 2 tablets by mouth every morning and 2 tablets by mouth every evening 120 tablet 5   • Multiple Vitamin-Folic Acid TABS Take by mouth     • nitroglycerin (NITROSTAT) 0 4 mg SL tablet Place 1 tablet (0 4 mg total) under the tongue every 5 (five) minutes as needed for chest pain 30 tablet 3   • nystatin (MYCOSTATIN) cream Apply topically 2 (two) times a day 30 g 0   • nystatin (MYCOSTATIN) powder Apply topically 2 (two) times a day 60 g 3   • ondansetron (ZOFRAN) 4 mg tablet Take 1 tablet (4 mg total) by mouth every 8 (eight) hours as needed for nausea or vomiting 20 tablet 2   • rosuvastatin (CRESTOR) 40 MG tablet take 1 tablet by mouth once daily 90 tablet 3     No current facility-administered medications for this visit  Objective:    Blood pressure 162/74, pulse 96, temperature 98 8 °F (37 1 °C), temperature source Tympanic, height 5' 3" (1 6 m), weight 95 7 kg (211 lb), SpO2 97 %, unknown if currently breastfeeding  Body mass index is 37 38 kg/m²  Body surface area is 1 98 meters squared  Physical Exam  Constitutional:       Appearance: She is well-developed  HENT:      Head: Normocephalic and atraumatic  Neck:      Thyroid: No thyromegaly  Cardiovascular:      Rate and Rhythm: Normal rate and regular rhythm  Heart sounds: Normal heart sounds  Pulmonary:      Effort: Pulmonary effort is normal       Breath sounds: Normal breath sounds  Abdominal:      General: Bowel sounds are normal       Palpations: Abdomen is soft  Comments:  Well healed laparoscopic incisions  Genitourinary:     Comments: -Normal external female genitalia, normal Bartholin's and Devers's glands                  -Normal midline urethral meatus  No lesions notes                  -Bladder without fullness mass or tenderness                  -Vagina without lesion or discharge No significant cystocele or rectocele noted                  -Cervix surgically absent                  -Uterus surgically absent                  -Adnexae surgically absent                  - Anus without fissure of lesion    Musculoskeletal:         General: Normal range of motion  Cervical back: Normal range of motion and neck supple  Lymphadenopathy:      Cervical: No cervical adenopathy  Skin:     General: Skin is warm and dry  Neurological:      Mental Status: She is alert and oriented to person, place, and time     Psychiatric:         Behavior: Behavior normal          Lab Results   Component Value Date     6 1 01/19/2022     Lab Results   Component Value Date    K 4 5 02/24/2022     02/24/2022    CO2 25 02/24/2022    BUN 12 02/24/2022    CREATININE 0 92 02/24/2022    GLUF 182 (H) 01/27/2022    CALCIUM 9 0 02/24/2022    AST 16 01/27/2022    ALT 30 01/27/2022    ALKPHOS 95 01/27/2022    EGFR 67 02/24/2022     Lab Results   Component Value Date    WBC 12 26 (H) 02/24/2022    HGB 11 8 02/24/2022    HCT 37 9 02/24/2022    MCV 88 02/24/2022     02/24/2022     Lab Results   Component Value Date    NEUTROABS 8 28 (H) 02/24/2022        Trend:  Lab Results   Component Value Date     6 1 01/19/2022

## 2022-12-13 ENCOUNTER — OFFICE VISIT (OUTPATIENT)
Dept: CARDIOLOGY CLINIC | Facility: HOSPITAL | Age: 62
End: 2022-12-13

## 2022-12-13 VITALS
WEIGHT: 212 LBS | DIASTOLIC BLOOD PRESSURE: 68 MMHG | SYSTOLIC BLOOD PRESSURE: 118 MMHG | BODY MASS INDEX: 37.56 KG/M2 | HEIGHT: 63 IN | HEART RATE: 88 BPM

## 2022-12-13 DIAGNOSIS — Z72.0 TOBACCO ABUSE: ICD-10-CM

## 2022-12-13 DIAGNOSIS — E78.5 DYSLIPIDEMIA: ICD-10-CM

## 2022-12-13 DIAGNOSIS — I10 BENIGN ESSENTIAL HYPERTENSION: ICD-10-CM

## 2022-12-13 DIAGNOSIS — Z12.11 ENCOUNTER FOR SCREENING COLONOSCOPY: ICD-10-CM

## 2022-12-13 DIAGNOSIS — Z95.5 PRESENCE OF DRUG COATED STENT IN RIGHT CORONARY ARTERY: ICD-10-CM

## 2022-12-13 DIAGNOSIS — I44.7 LEFT BUNDLE BRANCH BLOCK (LBBB): ICD-10-CM

## 2022-12-13 DIAGNOSIS — I25.10 ATHEROSCLEROSIS OF NATIVE CORONARY ARTERY OF NATIVE HEART WITHOUT ANGINA PECTORIS: Primary | ICD-10-CM

## 2022-12-13 RX ORDER — CARVEDILOL 25 MG/1
25 TABLET ORAL 2 TIMES DAILY WITH MEALS
Qty: 180 TABLET | Refills: 3 | Status: SHIPPED | OUTPATIENT
Start: 2022-12-13

## 2022-12-13 NOTE — PROGRESS NOTES
Cardiology Follow Up    Sathish Valdovinos  1960  [id number]  [address] [id number]    1  Atherosclerosis of native coronary artery of native heart without angina pectoris  POCT ECG      2  Presence of drug coated stent in right coronary artery        3  Left bundle branch block (LBBB)        4  Benign essential hypertension  carvedilol (COREG) 25 mg tablet      5  Dyslipidemia  Lipid Panel With Direct LDL    Comprehensive metabolic panel      6  Tobacco abuse        7  Encounter for screening colonoscopy  Ambulatory referral for colon cancer education          Discussion/Summary:  Ms Chace Oneal is a pleasant 57-year-old female who presents to the office today for routine follow-up  From a cardiac standpoint she offers no complaints  She is sedentary but otherwise asymptomatic  Her blood pressure is elevated in the office today upon my recheck and has been elevated during various office visits in the recent past   I will increase her carvedilol to 25 mg twice daily  I have asked her to monitor her blood pressure at home and call the office with persistently elevated readings  A low-salt diet was reinforced  Her most recent lipids reveal acceptable numbers with the exception of a slightly low HDL for which therapeutic lifestyle modifications were recommended  She will have these reassessed prior to her next visit  Smoking cessation was again advised  She apparently had a adverse reaction to Wellbutrin  She does not wish to attempt Chantix given her father did not tolerate this well  She does have nicotine patches at home which she contemplates using in the new year  She was encouraged to do so  After her last visit she underwent a repeat echocardiogram for reevaluation of her LV function in the setting of a known left bundle branch block    This remains preserved  I will see her back in the office in one year or sooner if deemed necessary  Interval History:   Ms Shyla Muñoz is a pleasant 75-year-old female who presents to the office today for routine follow-up  Since her last visit she underwent a TAHBSO due to fibroids  Incidentally she was noted to have a granulosa cell tumor of her left ovary at the time of the hysterectomy  She is under the care of gynecologic oncologist     Since her last visit she has been feeling well  She is sedentary  With the activity she does perform she feels well  She denies any exertional chest pain or shortness of breath  She denies any signs or symptoms of congestive heart failure including lower extremity edema, paroxysmal nocturnal dyspnea, orthopnea, acute weight gain or increasing abdominal girth  She denies lightheadedness, syncope or presyncope  She denies palpitations  She denies symptoms of claudication  She continues to smoke about a half a pack of cigarettes per day      Problem List     Anxiety    Atherosclerotic heart disease of native coronary artery without angina pectoris    Overview Signed 3/19/2018 12:47 PM by ANA Mtz     Description: s/p AMERICA ([employer]) mid RCA 05/28/2013         Dyslipidemia    Hypertension    Obesity    Type 2 diabetes mellitus ([facility] [address] )    Leukocytosis        Past Medical History:   Diagnosis Date   • Cardiac disease     MI May 25, 2013   • Chronic neck pain     Last Assessed:11/28/16   • Diabetes mellitus ([address] )    • Hyperlipidemia    • Hypertension    • Left bundle branch block     Last Assessed:12/5/13   • Mild depression     Last Assessed:11/28/16   • Tonsillar hypertrophy     Last Assessed:8/27/13     Social History     Socioeconomic History   • Marital status:      Spouse name: Not on file   • Number of children: 2   • Years of education: Not on file   • Highest education level: Not on file   Occupational History   • Occupation: admin  Comment: [employer]   Tobacco Use   • Smoking status: Every Day     Packs/day: 0 50     Years: 20 00     Pack years: 10 00     Types: Cigarettes   • Smokeless tobacco: Never   • Tobacco comments:     cutting back  education given   last smoked 2/8 2300   Vaping Use   • Vaping Use: Never used   Substance and Sexual Activity   • Alcohol use: No   • Drug use: No     Comment: occassionally    • Sexual activity: Not Currently   Other Topics Concern   • Not on file   Social History Narrative   • Not on file     Social Determinants of Health     Financial Resource Strain: Not on file   Food Insecurity: Not on file   Transportation Needs: Not on file   Physical Activity: Not on file   Stress: Not on file   Social Connections: Not on file   Intimate Partner Violence: Not on file   Housing Stability: Not on file      Family History   Problem Relation Age of Onset   • Arthritis Mother    • Colon cancer Mother 66   • Coronary artery disease Mother    • Fibrocystic breast disease Mother    • Hypertension Mother    • Cardiomyopathy Mother         Ischemic Dilated Cardiomyopathy   • Arthritis Father    • Kidney disease Father         Chronic (NKF classification)   • Coronary artery disease Father    • Gout Father    • Prostate cancer Father    • Hyperlipidemia Father         Pure Hypercholesterolemia   • Stroke Father         Stroke Syndrome   • Diabetes type II Father    • Hypertension Sister    • Hypothyroidism Sister    • Other Sister         IVP- Solitary Kidney Calculus   • Rheum arthritis Sister    • Alcohol abuse Brother    • Bipolar disorder Brother         NOS   • Other Brother         IVP- Solitary Kidney Calculus   • Francois's disease Brother    • Cerebral aneurysm Brother         Ruptured   • Breast cancer Maternal Aunt    • Peripheral vascular disease Maternal Aunt    • Breast cancer Cousin         2 maternal cousins   • Lung cancer Maternal Uncle    • Diabetes type II Maternal Uncle    • Thrombocytopenia Daughter Idiopathic Thrombocytopenia Purpura at 10 yo   • Fibroids Paternal Grandmother      Past Surgical History:   Procedure Laterality Date   • CARDIAC SURGERY      1 stent placed   • CORONARY ANGIOPLASTY WITH STENT PLACEMENT  05/28/2013    per [employer]: Cath stent 1 type drug-eluting, mild RCA   • CA TOTAL ABDOM HYSTERECTOMY N/A 2/9/2022    Procedure: HYSTERECTOMY TOTAL ABDOMINAL (ROMÁN); B/L SALPINGO-OOPHORECTOMY, CYSTOSCOPY;  Surgeon: Benjaman Gowers, MD;  Location: [facility];  Service: Obstetrics       Current Outpatient Medications:   •  acetaminophen (TYLENOL) 325 mg tablet, Take 3 tablets (975 mg total) by mouth every 8 (eight) hours, Disp: , Rfl:   •  aspirin 325 mg tablet, Take 325 mg by mouth daily, Disp: , Rfl:   •  carvedilol (COREG) 25 mg tablet, Take 1 tablet (25 mg total) by mouth 2 (two) times a day with meals, Disp: 180 tablet, Rfl: 3  •  cetirizine (ZyrTEC) 10 mg tablet, Take 1 tablet (10 mg total) by mouth daily, Disp: 30 tablet, Rfl: 0  •  Cholecalciferol (VITAMIN D3 PO), Take 2,000 Units by mouth 2 (two) times a day, Disp: , Rfl:   •  cyanocobalamin 2000 MCG tablet, Take 2,000 mcg by mouth daily, Disp: , Rfl:   •  fluticasone (FLONASE) 50 mcg/act nasal spray, 1 spray into each nostril daily, Disp: 15 8 mL, Rfl: 5  •  glipiZIDE (GLUCOTROL) 5 mg tablet, take 1 tablet by mouth once daily, Disp: 30 tablet, Rfl: 5  •  Glucose Blood (ONETOUCH TEST VI), by In Vitro route, Disp: , Rfl:   •  glucose blood (OneTouch Verio) test strip, use 1 TEST STRIP to TEST BLOOD SUGAR three times a day, Disp: 100 strip, Rfl: 0  •  Januvia 100 MG tablet, take 1 tablet by mouth once daily, Disp: 30 tablet, Rfl: 0  •  losartan (COZAAR) 100 MG tablet, take 1 tablet by mouth once daily, Disp: 90 tablet, Rfl: 3  •  metFORMIN (GLUCOPHAGE) 500 mg tablet, take 2 tablets by mouth every morning and 2 tablets by mouth every evening, Disp: 120 tablet, Rfl: 5  •  Multiple Vitamin-Folic Acid TABS, Take by mouth, Disp: , Rfl:   • nitroglycerin (NITROSTAT) 0 4 mg SL tablet, Place 1 tablet (0 4 mg total) under the tongue every 5 (five) minutes as needed for chest pain, Disp: 30 tablet, Rfl: 3  •  nystatin (MYCOSTATIN) cream, Apply topically 2 (two) times a day, Disp: 30 g, Rfl: 0  •  nystatin (MYCOSTATIN) powder, Apply topically 2 (two) times a day, Disp: 60 g, Rfl: 3  •  ondansetron (ZOFRAN) 4 mg tablet, Take 1 tablet (4 mg total) by mouth every 8 (eight) hours as needed for nausea or vomiting, Disp: 20 tablet, Rfl: 2  •  rosuvastatin (CRESTOR) 40 MG tablet, take 1 tablet by mouth once daily, Disp: 90 tablet, Rfl: 3  Allergies   Allergen Reactions   • Hctz [Hydrochlorothiazide] Shortness Of Breath and Dizziness   • Wellbutrin [Bupropion] Shortness Of Breath     Category: Adverse Reaction;    • Ace Inhibitors Cough       Labs:     Chemistry        Component Value Date/Time    K 4 5 02/24/2022 1444     02/24/2022 1444    CO2 25 02/24/2022 1444    BUN 12 02/24/2022 1444    CREATININE 0 92 02/24/2022 1444        Component Value Date/Time    CALCIUM 9 0 02/24/2022 1444    ALKPHOS 95 01/27/2022 1216    AST 16 01/27/2022 1216    ALT 30 01/27/2022 1216            No results found for: CHOL  Lab Results   Component Value Date    HDL 43 (L) 01/27/2022    HDL 43 (L) 11/22/2021    HDL 39 (L) 10/30/2019     Lab Results   Component Value Date    LDLCALC 26 01/27/2022    LDLCALC 28 11/22/2021    LDLCALC 34 10/30/2019     Lab Results   Component Value Date    TRIG 143 01/27/2022    TRIG 150 11/22/2021    TRIG 113 10/30/2019     No results found for: CHOLHDL    Imaging: No results found  ECG:  N normal sinus rhythm, left bundle branch block      Review of Systems   Cardiovascular: Negative for chest pain, claudication, cyanosis, dyspnea on exertion, leg swelling, near-syncope, orthopnea and palpitations  Musculoskeletal: Positive for back pain  All other systems reviewed and are negative        Vitals:    12/13/22 1417   BP: 118/68   Pulse: 88 Vitals:    12/13/22 1417   Weight: 96 2 kg (212 lb)     Height: 5' 3" (160 cm)   Body mass index is 37 55 kg/m²      Physical Exam:  General:  Alert and cooperative, appears stated age  HEENT:  PERRLA, EOMI, no scleral icterus, no conjunctival pallor  Neck:  No lymphadenopathy, no thyromegaly, no carotid bruits, no elevated JVP  Heart:  Regular rate and rhythm, normal S1/S2, no S3/S4, no murmur  Lungs:  Clear to auscultation bilaterally   Abdomen:  Soft, non-tender, positive bowel sounds, no rebound or guarding,   no organomegaly   Extremities:  No clubbing, cyanosis or edema   Vascular:  2+ pedal pulses  Skin:  No rashes or lesions on exposed skin  Neurologic:  Cranial nerves II-XII grossly intact without focal deficits

## 2022-12-14 DIAGNOSIS — E11.9 TYPE 2 DIABETES MELLITUS WITHOUT COMPLICATION, WITHOUT LONG-TERM CURRENT USE OF INSULIN (HCC): ICD-10-CM

## 2022-12-14 RX ORDER — SITAGLIPTIN 100 MG/1
TABLET, FILM COATED ORAL
Qty: 30 TABLET | Refills: 0 | Status: SHIPPED | OUTPATIENT
Start: 2022-12-14

## 2022-12-16 ENCOUNTER — TELEPHONE (OUTPATIENT)
Dept: CARDIOLOGY CLINIC | Facility: HOSPITAL | Age: 62
End: 2022-12-16

## 2022-12-16 NOTE — TELEPHONE ENCOUNTER
Emily Man called in with a few BP readings from the last 3 days  At her last visit with Dr Thien Beasley on 12/13 Dr Thien Beasley increased her carvedilol to 25mg BID  Emily Man states she has been feeling dizzy and is asking to go back on the lower dose  The bp readings are taken at 1pm and she also takes her medication at 1pm  Per Dr Thien Beasley she can go back to the lower dose of 12 5mg BID of the carvedilol  She should start checking her BP one hour after taking the medication  Emily Man understands and will give us a call next week to let us know her BP readings

## 2022-12-27 DIAGNOSIS — E11.9 TYPE 2 DIABETES MELLITUS WITHOUT COMPLICATION, WITHOUT LONG-TERM CURRENT USE OF INSULIN (HCC): ICD-10-CM

## 2022-12-28 RX ORDER — BLOOD SUGAR DIAGNOSTIC
STRIP MISCELLANEOUS
Qty: 100 STRIP | Refills: 0 | Status: SHIPPED | OUTPATIENT
Start: 2022-12-28

## 2023-01-06 ENCOUNTER — OFFICE VISIT (OUTPATIENT)
Dept: INTERNAL MEDICINE CLINIC | Facility: CLINIC | Age: 63
End: 2023-01-06

## 2023-01-06 VITALS
DIASTOLIC BLOOD PRESSURE: 90 MMHG | RESPIRATION RATE: 18 BRPM | HEART RATE: 89 BPM | OXYGEN SATURATION: 99 % | SYSTOLIC BLOOD PRESSURE: 160 MMHG | TEMPERATURE: 97.1 F

## 2023-01-06 DIAGNOSIS — I25.10 ATHEROSCLEROSIS OF NATIVE CORONARY ARTERY OF NATIVE HEART WITHOUT ANGINA PECTORIS: ICD-10-CM

## 2023-01-06 DIAGNOSIS — I10 BENIGN ESSENTIAL HYPERTENSION: ICD-10-CM

## 2023-01-06 DIAGNOSIS — C56.9 MALIGNANT NEOPLASM OF OVARY, UNSPECIFIED LATERALITY (HCC): ICD-10-CM

## 2023-01-06 DIAGNOSIS — E11.9 TYPE 2 DIABETES MELLITUS WITHOUT COMPLICATION, WITHOUT LONG-TERM CURRENT USE OF INSULIN (HCC): Primary | ICD-10-CM

## 2023-01-06 DIAGNOSIS — Z12.11 SCREENING FOR COLON CANCER: ICD-10-CM

## 2023-01-06 DIAGNOSIS — E78.5 DYSLIPIDEMIA: ICD-10-CM

## 2023-01-06 PROBLEM — Z01.818 PRE-OP EVALUATION: Status: RESOLVED | Noted: 2022-02-01 | Resolved: 2023-01-06

## 2023-01-06 NOTE — PROGRESS NOTES
Name: Jet Howard      : 1960      MRN: [id number]  Encounter Provider: ANA Torres  Encounter Date: 2023   Encounter department: [address] [place] [address]  Will repeat labs with A1C and urine  Continue to monitor BP  Bp up today did not take medication yet  Continues to see Cardiology  Will follow up with Joselito Enciso  Will follow back up in 3 months or sooner  1  Type 2 diabetes mellitus without complication, without long-term current use of insulin (HCC)  -     Comprehensive metabolic panel; Future  -     CBC and differential; Future  -     TSH, 3rd generation with Free T4 reflex; Future  -     HEMOGLOBIN A1C W/ EAG ESTIMATION; Future  -     Microalbumin / creatinine urine ratio    2  Malignant neoplasm of ovary, unspecified laterality ([facility] [address] )  -     Comprehensive metabolic panel; Future  -     CBC and differential; Future  -     TSH, 3rd generation with Free T4 reflex; Future    3  Atherosclerosis of native coronary artery of native heart without angina pectoris  -     Comprehensive metabolic panel; Future  -     CBC and differential; Future  -     TSH, 3rd generation with Free T4 reflex; Future    4  Benign essential hypertension  -     Comprehensive metabolic panel; Future  -     CBC and differential; Future  -     TSH, 3rd generation with Free T4 reflex; Future    5  Dyslipidemia  -     Comprehensive metabolic panel; Future  -     CBC and differential; Future  -     TSH, 3rd generation with Free T4 reflex; Future    6  Screening for colon cancer  -     Juanita Mclain is for a follow up visit  She is doing well but having some right shoulder pain  She is checking her BP at home  She denies any chest pain, SOB, or palpitations  She denies any depression or anxiety  She is having some issues with the right eye and drainage  She states she does have to follow back up with Joselito Enciso  She is up to date on her screenings  She offers no other issues  Review of Systems   Musculoskeletal: Positive for arthralgias and myalgias  All other systems reviewed and are negative  Current Outpatient Medications on File Prior to Visit   Medication Sig   • Januvia 100 MG tablet take 1 tablet by mouth once daily   • acetaminophen (TYLENOL) 325 mg tablet Take 3 tablets (975 mg total) by mouth every 8 (eight) hours   • aspirin 325 mg tablet Take 325 mg by mouth daily   • carvedilol (COREG) 25 mg tablet Take 1 tablet (25 mg total) by mouth 2 (two) times a day with meals   • cetirizine (ZyrTEC) 10 mg tablet Take 1 tablet (10 mg total) by mouth daily   • Cholecalciferol (VITAMIN D3 PO) Take 2,000 Units by mouth 2 (two) times a day   • cyanocobalamin 2000 MCG tablet Take 2,000 mcg by mouth daily   • fluticasone (FLONASE) 50 mcg/act nasal spray 1 spray into each nostril daily   • glipiZIDE (GLUCOTROL) 5 mg tablet take 1 tablet by mouth once daily   • Glucose Blood (ONETOUCH TEST VI) by In Vitro route   • glucose blood (OneTouch Verio) test strip use 1 TEST STRIP to TEST BLOOD SUGAR three times a day   • losartan (COZAAR) 100 MG tablet take 1 tablet by mouth once daily   • metFORMIN (GLUCOPHAGE) 500 mg tablet take 2 tablets by mouth every morning and 2 tablets by mouth every evening   • Multiple Vitamin-Folic Acid TABS Take by mouth   • nitroglycerin (NITROSTAT) 0 4 mg SL tablet Place 1 tablet (0 4 mg total) under the tongue every 5 (five) minutes as needed for chest pain   • nystatin (MYCOSTATIN) cream Apply topically 2 (two) times a day   • nystatin (MYCOSTATIN) powder Apply topically 2 (two) times a day   • ondansetron (ZOFRAN) 4 mg tablet Take 1 tablet (4 mg total) by mouth every 8 (eight) hours as needed for nausea or vomiting   • rosuvastatin (CRESTOR) 40 MG tablet take 1 tablet by mouth once daily       Objective     /90   Pulse 89   Temp (!) 97 1 °F (36 2 °C)   Resp 18   SpO2 99%     Physical Exam  Vitals reviewed  Constitutional:       Appearance: Normal appearance  She is normal weight  HENT:      Head: Normocephalic and atraumatic  Right Ear: Tympanic membrane, ear canal and external ear normal       Left Ear: Tympanic membrane, ear canal and external ear normal       Nose: Nose normal       Mouth/Throat:      Mouth: Mucous membranes are moist       Pharynx: Oropharynx is clear  Eyes:      Extraocular Movements: Extraocular movements intact  Conjunctiva/sclera: Conjunctivae normal       Pupils: Pupils are equal, round, and reactive to light  Cardiovascular:      Rate and Rhythm: Normal rate and regular rhythm  Pulses: Normal pulses  Heart sounds: Normal heart sounds  Pulmonary:      Effort: Pulmonary effort is normal       Breath sounds: Normal breath sounds  Abdominal:      General: Abdomen is flat  Bowel sounds are normal       Palpations: Abdomen is soft  Musculoskeletal:         General: Normal range of motion  Skin:     General: Skin is warm and dry  Capillary Refill: Capillary refill takes less than 2 seconds  Neurological:      General: No focal deficit present  Mental Status: She is alert and oriented to person, place, and time  Mental status is at baseline  Psychiatric:         Mood and Affect: Mood normal          Behavior: Behavior normal          Thought Content:  Thought content normal          Judgment: Judgment normal        ANA Knapp

## 2023-01-13 DIAGNOSIS — E11.9 TYPE 2 DIABETES MELLITUS WITHOUT COMPLICATION, WITHOUT LONG-TERM CURRENT USE OF INSULIN (HCC): ICD-10-CM

## 2023-01-14 RX ORDER — SITAGLIPTIN 100 MG/1
TABLET, FILM COATED ORAL
Qty: 30 TABLET | Refills: 0 | Status: SHIPPED | OUTPATIENT
Start: 2023-01-14

## 2023-02-02 DIAGNOSIS — E11.9 TYPE 2 DIABETES MELLITUS WITHOUT COMPLICATION, WITHOUT LONG-TERM CURRENT USE OF INSULIN (HCC): ICD-10-CM

## 2023-02-02 RX ORDER — LANCETS 33 GAUGE
EACH MISCELLANEOUS
Qty: 100 EACH | Refills: 3 | Status: SHIPPED | OUTPATIENT
Start: 2023-02-02

## 2023-02-14 ENCOUNTER — VBI (OUTPATIENT)
Dept: ADMINISTRATIVE | Facility: OTHER | Age: 63
End: 2023-02-14

## 2023-02-16 DIAGNOSIS — E11.9 TYPE 2 DIABETES MELLITUS WITHOUT COMPLICATION, WITHOUT LONG-TERM CURRENT USE OF INSULIN (HCC): ICD-10-CM

## 2023-02-16 RX ORDER — SITAGLIPTIN 100 MG/1
TABLET, FILM COATED ORAL
Qty: 30 TABLET | Refills: 0 | Status: SHIPPED | OUTPATIENT
Start: 2023-02-16

## 2023-03-05 ENCOUNTER — APPOINTMENT (OUTPATIENT)
Dept: LAB | Facility: HOSPITAL | Age: 63
End: 2023-03-05

## 2023-03-05 DIAGNOSIS — I25.10 ATHEROSCLEROSIS OF NATIVE CORONARY ARTERY OF NATIVE HEART WITHOUT ANGINA PECTORIS: ICD-10-CM

## 2023-03-05 DIAGNOSIS — E78.5 DYSLIPIDEMIA: ICD-10-CM

## 2023-03-05 DIAGNOSIS — I10 BENIGN ESSENTIAL HYPERTENSION: ICD-10-CM

## 2023-03-05 DIAGNOSIS — C56.9 MALIGNANT NEOPLASM OF OVARY, UNSPECIFIED LATERALITY (HCC): ICD-10-CM

## 2023-03-05 DIAGNOSIS — B37.2 SKIN YEAST INFECTION: ICD-10-CM

## 2023-03-05 DIAGNOSIS — J01.10 ACUTE NON-RECURRENT FRONTAL SINUSITIS: ICD-10-CM

## 2023-03-05 DIAGNOSIS — E11.9 TYPE 2 DIABETES MELLITUS WITHOUT COMPLICATION, WITHOUT LONG-TERM CURRENT USE OF INSULIN (HCC): ICD-10-CM

## 2023-03-05 DIAGNOSIS — Z00.00 ROUTINE ADULT HEALTH MAINTENANCE: ICD-10-CM

## 2023-03-05 DIAGNOSIS — R21 RASH: ICD-10-CM

## 2023-03-05 LAB
ALBUMIN SERPL BCP-MCNC: 4.3 G/DL (ref 3.5–5)
ALP SERPL-CCNC: 85 U/L (ref 34–104)
ALT SERPL W P-5'-P-CCNC: 44 U/L (ref 7–52)
ANION GAP SERPL CALCULATED.3IONS-SCNC: 9 MMOL/L (ref 4–13)
AST SERPL W P-5'-P-CCNC: 38 U/L (ref 13–39)
BASOPHILS # BLD AUTO: 0.06 THOUSANDS/ÂΜL (ref 0–0.1)
BASOPHILS NFR BLD AUTO: 1 % (ref 0–1)
BILIRUB SERPL-MCNC: 0.32 MG/DL (ref 0.2–1)
BUN SERPL-MCNC: 13 MG/DL (ref 5–25)
CALCIUM SERPL-MCNC: 8.9 MG/DL (ref 8.4–10.2)
CHLORIDE SERPL-SCNC: 102 MMOL/L (ref 96–108)
CHOLEST SERPL-MCNC: 78 MG/DL
CO2 SERPL-SCNC: 24 MMOL/L (ref 21–32)
CREAT SERPL-MCNC: 0.98 MG/DL (ref 0.6–1.3)
CREAT UR-MCNC: 48.3 MG/DL
EOSINOPHIL # BLD AUTO: 0.21 THOUSAND/ÂΜL (ref 0–0.61)
EOSINOPHIL NFR BLD AUTO: 2 % (ref 0–6)
ERYTHROCYTE [DISTWIDTH] IN BLOOD BY AUTOMATED COUNT: 15.8 % (ref 11.6–15.1)
EST. AVERAGE GLUCOSE BLD GHB EST-MCNC: 197 MG/DL
GFR SERPL CREATININE-BSD FRML MDRD: 62 ML/MIN/1.73SQ M
GLUCOSE P FAST SERPL-MCNC: 214 MG/DL (ref 65–99)
HBA1C MFR BLD: 8.5 %
HCT VFR BLD AUTO: 42.8 % (ref 34.8–46.1)
HDLC SERPL-MCNC: 36 MG/DL
HGB BLD-MCNC: 13.8 G/DL (ref 11.5–15.4)
IMM GRANULOCYTES # BLD AUTO: 0.04 THOUSAND/UL (ref 0–0.2)
IMM GRANULOCYTES NFR BLD AUTO: 0 % (ref 0–2)
LDLC SERPL CALC-MCNC: 19 MG/DL (ref 0–100)
LDLC SERPL DIRECT ASSAY-MCNC: 29 MG/DL (ref 0–100)
LYMPHOCYTES # BLD AUTO: 3.04 THOUSANDS/ÂΜL (ref 0.6–4.47)
LYMPHOCYTES NFR BLD AUTO: 27 % (ref 14–44)
MCH RBC QN AUTO: 28 PG (ref 26.8–34.3)
MCHC RBC AUTO-ENTMCNC: 32.2 G/DL (ref 31.4–37.4)
MCV RBC AUTO: 87 FL (ref 82–98)
MICROALBUMIN UR-MCNC: 137 MG/L (ref 0–20)
MICROALBUMIN/CREAT 24H UR: 284 MG/G CREATININE (ref 0–30)
MONOCYTES # BLD AUTO: 0.8 THOUSAND/ÂΜL (ref 0.17–1.22)
MONOCYTES NFR BLD AUTO: 7 % (ref 4–12)
NEUTROPHILS # BLD AUTO: 7.31 THOUSANDS/ÂΜL (ref 1.85–7.62)
NEUTS SEG NFR BLD AUTO: 63 % (ref 43–75)
NONHDLC SERPL-MCNC: 42 MG/DL
NRBC BLD AUTO-RTO: 0 /100 WBCS
PLATELET # BLD AUTO: 288 THOUSANDS/UL (ref 149–390)
PMV BLD AUTO: 10.1 FL (ref 8.9–12.7)
POTASSIUM SERPL-SCNC: 3.9 MMOL/L (ref 3.5–5.3)
PROT SERPL-MCNC: 7 G/DL (ref 6.4–8.4)
RBC # BLD AUTO: 4.92 MILLION/UL (ref 3.81–5.12)
SODIUM SERPL-SCNC: 135 MMOL/L (ref 135–147)
TRIGL SERPL-MCNC: 116 MG/DL
TSH SERPL DL<=0.05 MIU/L-ACNC: 2.38 UIU/ML (ref 0.45–4.5)
WBC # BLD AUTO: 11.46 THOUSAND/UL (ref 4.31–10.16)

## 2023-03-07 PROBLEM — Z12.11 SCREENING FOR COLON CANCER: Status: RESOLVED | Noted: 2023-01-06 | Resolved: 2023-03-07

## 2023-03-08 LAB — INHIBIN B SERPL-MCNC: <7 PG/ML (ref 0–16.9)

## 2023-03-10 LAB — INHIBIN A SERPL-MCNC: 2.8 PG/ML

## 2023-03-21 DIAGNOSIS — E11.9 TYPE 2 DIABETES MELLITUS WITHOUT COMPLICATION, WITHOUT LONG-TERM CURRENT USE OF INSULIN (HCC): ICD-10-CM

## 2023-03-21 RX ORDER — SITAGLIPTIN 100 MG/1
TABLET, FILM COATED ORAL
Qty: 30 TABLET | Refills: 0 | Status: SHIPPED | OUTPATIENT
Start: 2023-03-21

## 2023-03-27 ENCOUNTER — OFFICE VISIT (OUTPATIENT)
Dept: GYNECOLOGIC ONCOLOGY | Facility: CLINIC | Age: 63
End: 2023-03-27

## 2023-03-27 VITALS
HEIGHT: 63 IN | DIASTOLIC BLOOD PRESSURE: 74 MMHG | TEMPERATURE: 99.7 F | BODY MASS INDEX: 37.39 KG/M2 | WEIGHT: 211 LBS | SYSTOLIC BLOOD PRESSURE: 140 MMHG

## 2023-03-27 DIAGNOSIS — E11.00 TYPE 2 DIABETES MELLITUS WITH HYPEROSMOLARITY WITHOUT COMA, WITHOUT LONG-TERM CURRENT USE OF INSULIN (HCC): ICD-10-CM

## 2023-03-27 DIAGNOSIS — C56.9 MALIGNANT NEOPLASM OF OVARY, UNSPECIFIED LATERALITY (HCC): Primary | ICD-10-CM

## 2023-03-27 RX ORDER — ASPIRIN 81 MG/1
81 TABLET ORAL DAILY
COMMUNITY

## 2023-03-27 RX ORDER — GLIPIZIDE 5 MG/1
5 TABLET ORAL DAILY
Qty: 30 TABLET | Refills: 5 | Status: SHIPPED | OUTPATIENT
Start: 2023-03-27

## 2023-03-27 NOTE — ASSESSMENT & PLAN NOTE
Patient is a very pleasant 44-year-old female with a history of stage Ic granulosa cell tumor of the ovary status post surgical removal   She has no evidence of recurrence of disease at this time  Her inhibin levels are unremarkable her exam is unremarkable  We will see her back in 3 months for repeat evaluation

## 2023-03-27 NOTE — PROGRESS NOTES
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   She has no evidence of recurrence of disease at this time  Her inhibin levels are unremarkable her exam is unremarkable  We will see her back in 3 months for repeat evaluation  Relevant Orders    Inhibin A    Inhibin B         CHIEF COMPLAINT: Surveillance ovarian cancer      Problem:  Cancer Staging   Ovarian cancer ([facility] [address] )  Staging form: Ovary, Fallopian Tube, Primary Peritoneal, AJCC 8th Edition  - Clinical: FIGO Stage IC2, calculated as Stage IC (cT1c2, cN0, cM0) - Signed by Sundar Sandhu MD on 3/7/2022  - Pathologic stage from 3/7/2022: FIGO Stage IC2, calculated as Stage Unknown (pT1c2, pNX, cM0) - Signed by Sundar Sandhu MD on 3/7/2022        Previous therapy:  Oncology History   Ovarian cancer ([facility] [address] )   12/2021 Surgery    TAHBSO for fibroid uterus of 24 weeks with an incidental finding of a 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  Surgery performed by Lilli Tejeda     3/7/2022 Initial Diagnosis    Ovarian cancer ([facility] [address] )     3/7/2022 -  Cancer Staged    Staging form: Ovary, Fallopian Tube, Primary Peritoneal, AJCC 8th Edition  - Clinical: FIGO Stage IC2, calculated as Stage IC (cT1c2, cN0, cM0) - Signed by Sundar Sandhu MD on 3/7/2022       3/7/2022 -  Cancer Staged    Staging form: Ovary, Fallopian Tube, Primary Peritoneal, AJCC 8th Edition  - Pathologic stage from 3/7/2022: FIGO Stage IC2, calculated as Stage Unknown (pT1c2, pNX, cM0) - Signed by Sundar Sandhu MD on 3/7/2022  Histopathologic type: Granulosa cell tumor, adult type  Stage prefix: Initial diagnosis  Histologic grade (G): G1  Histologic grading system: 4 grade system  Residual tumor (R): R0 - None             Patient ID: Terence Valentino is a 58 y o  female  Patient is a very pleasant 51-year-old female with a history of 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 status post ROMÁN/BSO for large fibroid uterus  Ovarian cancer was incidentally diagnosed  Treatment was with surgery alone  She has had no evidence of recurrence of disease  Patient presents today for follow-up  She is without complaint  Inhibin a is less than 7 and inhibin B is 2 8  Both of these are in the normal range    Today, the patient is doing well  She denies significant abdominal pain, pelvic pain, nausea, vomiting, constipation, diarrhea, fevers, chills, or vaginal bleeding  The following portions of the patient's history were reviewed and updated as appropriate: allergies, current medications, past medical history, past social history, past surgical history and problem list     Review of Systems   Constitutional: Negative  HENT: Negative  Eyes: Negative  Respiratory: Negative  Cardiovascular: Negative  Gastrointestinal: Negative  Endocrine: Negative  Genitourinary: Negative  Musculoskeletal: Negative  Skin: Negative  Neurological: Negative  Hematological: Negative  Psychiatric/Behavioral: Negative          Current Outpatient Medications   Medication Sig Dispense Refill   • aspirin (ECOTRIN LOW STRENGTH) 81 mg EC tablet Take 81 mg by mouth daily     • acetaminophen (TYLENOL) 325 mg tablet Take 3 tablets (975 mg total) by mouth every 8 (eight) hours     • aspirin 325 mg tablet Take 325 mg by mouth daily     • carvedilol (COREG) 25 mg tablet Take 1 tablet (25 mg total) by mouth 2 (two) times a day with meals 180 tablet 3   • cetirizine (ZyrTEC) 10 mg tablet Take 1 tablet (10 mg total) by mouth daily 30 tablet 0   • Cholecalciferol (VITAMIN D3 PO) Take 2,000 Units by mouth 2 (two) times a day     • cyanocobalamin 2000 MCG tablet Take 2,000 mcg by mouth daily     • fluticasone (FLONASE) 50 mcg/act nasal spray 1 spray into each nostril daily 15 8 mL "5   • glipiZIDE (GLUCOTROL) 5 mg tablet take 1 tablet by mouth once daily 30 tablet 5   • Glucose Blood (ONETOUCH TEST VI) by In Vitro route     • glucose blood (OneTouch Verio) test strip use 1 TEST STRIP to TEST BLOOD SUGAR three times a day 100 strip 0   • Januvia 100 MG tablet take 1 tablet by mouth daily 30 tablet 0   • Lancets (OneTouch Delica Plus [id number]) MISC use 1 LANCET to TEST BLOOD SUGAR three times a day 100 each 3   • losartan (COZAAR) 100 MG tablet take 1 tablet by mouth once daily 90 tablet 3   • metFORMIN (GLUCOPHAGE) 500 mg tablet take 2 tablets by mouth every morning and 2 tablets by mouth every evening 120 tablet 5   • Multiple Vitamin-Folic Acid TABS Take by mouth     • nitroglycerin (NITROSTAT) 0 4 mg SL tablet Place 1 tablet (0 4 mg total) under the tongue every 5 (five) minutes as needed for chest pain 30 tablet 3   • nystatin (MYCOSTATIN) cream Apply topically 2 (two) times a day 30 g 0   • nystatin (MYCOSTATIN) powder Apply topically 2 (two) times a day 60 g 3   • ondansetron (ZOFRAN) 4 mg tablet Take 1 tablet (4 mg total) by mouth every 8 (eight) hours as needed for nausea or vomiting 20 tablet 2   • rosuvastatin (CRESTOR) 40 MG tablet take 1 tablet by mouth once daily 90 tablet 3     No current facility-administered medications for this visit  Objective:    Blood pressure 140/74, temperature 99 7 °F (37 6 °C), temperature source Tympanic, height 5' 3\" (1 6 m), weight 95 7 kg (211 lb), unknown if currently breastfeeding  Body mass index is 37 38 kg/m²  Body surface area is 1 98 meters squared  Physical Exam  Constitutional:       Appearance: She is well-developed  HENT:      Head: Normocephalic and atraumatic  Neck:      Thyroid: No thyromegaly  Cardiovascular:      Rate and Rhythm: Normal rate and regular rhythm  Heart sounds: Normal heart sounds  Pulmonary:      Effort: Pulmonary effort is normal       Breath sounds: Normal breath sounds     Abdominal:      " General: Bowel sounds are normal       Palpations: Abdomen is soft  Comments: Well healed midline incision  Genitourinary:     Comments: -Normal external female genitalia, normal Bartholin's and [place]'s glands                  -Normal midline urethral meatus  No lesions notes                  -Bladder without fullness mass or tenderness                  -Vagina without lesion or discharge No significant cystocele or rectocele noted                  -Cervix surgically absent                  -Uterus surgically absent                  -Adnexae surgically absent                  - Anus without fissure of lesion    Musculoskeletal:         General: Normal range of motion  Cervical back: Normal range of motion and neck supple  Lymphadenopathy:      Cervical: No cervical adenopathy  Skin:     General: Skin is warm and dry  Neurological:      Mental Status: She is alert and oriented to person, place, and time     Psychiatric:         Behavior: Behavior normal          Lab Results   Component Value Date     6 1 01/19/2022     Lab Results   Component Value Date    K 3 9 03/05/2023     03/05/2023    CO2 24 03/05/2023    BUN 13 03/05/2023    CREATININE 0 98 03/05/2023    GLUF 214 (H) 03/05/2023    CALCIUM 8 9 03/05/2023    AST 38 03/05/2023    ALT 44 03/05/2023    ALKPHOS 85 03/05/2023    EGFR 62 03/05/2023     Lab Results   Component Value Date    WBC 11 46 (H) 03/05/2023    HGB 13 8 03/05/2023    HCT 42 8 03/05/2023    MCV 87 03/05/2023     03/05/2023     Lab Results   Component Value Date    NEUTROABS 7 31 03/05/2023        Trend:  Lab Results   Component Value Date     6 1 01/19/2022

## 2023-04-03 ENCOUNTER — OFFICE VISIT (OUTPATIENT)
Dept: INTERNAL MEDICINE CLINIC | Facility: CLINIC | Age: 63
End: 2023-04-03

## 2023-04-03 VITALS
SYSTOLIC BLOOD PRESSURE: 150 MMHG | TEMPERATURE: 97.6 F | BODY MASS INDEX: 37.33 KG/M2 | DIASTOLIC BLOOD PRESSURE: 80 MMHG | OXYGEN SATURATION: 95 % | WEIGHT: 210.7 LBS | HEART RATE: 90 BPM | HEIGHT: 63 IN

## 2023-04-03 DIAGNOSIS — E11.9 TYPE 2 DIABETES MELLITUS WITHOUT COMPLICATION, WITHOUT LONG-TERM CURRENT USE OF INSULIN (HCC): ICD-10-CM

## 2023-04-03 DIAGNOSIS — M25.511 ACUTE PAIN OF RIGHT SHOULDER: ICD-10-CM

## 2023-04-03 DIAGNOSIS — J01.10 ACUTE NON-RECURRENT FRONTAL SINUSITIS: ICD-10-CM

## 2023-04-03 DIAGNOSIS — E78.5 DYSLIPIDEMIA: ICD-10-CM

## 2023-04-03 DIAGNOSIS — T14.8XXA SKIN EXCORIATION: ICD-10-CM

## 2023-04-03 DIAGNOSIS — C56.9 MALIGNANT NEOPLASM OF OVARY, UNSPECIFIED LATERALITY (HCC): ICD-10-CM

## 2023-04-03 DIAGNOSIS — I10 BENIGN ESSENTIAL HYPERTENSION: ICD-10-CM

## 2023-04-03 DIAGNOSIS — E11.00 TYPE 2 DIABETES MELLITUS WITH HYPEROSMOLARITY WITHOUT COMA, WITHOUT LONG-TERM CURRENT USE OF INSULIN (HCC): Primary | ICD-10-CM

## 2023-04-03 DIAGNOSIS — B37.2 SKIN YEAST INFECTION: ICD-10-CM

## 2023-04-03 DIAGNOSIS — I25.10 ATHEROSCLEROSIS OF NATIVE CORONARY ARTERY OF NATIVE HEART WITHOUT ANGINA PECTORIS: ICD-10-CM

## 2023-04-03 DIAGNOSIS — R21 RASH: ICD-10-CM

## 2023-04-03 RX ORDER — DULAGLUTIDE 0.75 MG/.5ML
0.75 INJECTION, SOLUTION SUBCUTANEOUS
Qty: 6 ML | Refills: 1 | Status: SHIPPED | OUTPATIENT
Start: 2023-04-03

## 2023-04-03 RX ORDER — NYSTATIN 100000 [USP'U]/G
POWDER TOPICAL 2 TIMES DAILY
Qty: 60 G | Refills: 3 | Status: SHIPPED | OUTPATIENT
Start: 2023-04-03

## 2023-04-03 RX ORDER — FLUTICASONE PROPIONATE 50 MCG
1 SPRAY, SUSPENSION (ML) NASAL DAILY
Qty: 15.8 ML | Refills: 5 | Status: SHIPPED | OUTPATIENT
Start: 2023-04-03

## 2023-04-03 RX ORDER — NYSTATIN 100000 U/G
CREAM TOPICAL 2 TIMES DAILY
Qty: 30 G | Refills: 0 | Status: SHIPPED | OUTPATIENT
Start: 2023-04-03

## 2023-04-03 RX ORDER — FLUCONAZOLE 150 MG/1
150 TABLET ORAL ONCE
Qty: 1 TABLET | Refills: 0 | Status: SHIPPED | OUTPATIENT
Start: 2023-04-03 | End: 2023-04-03

## 2023-04-03 NOTE — PROGRESS NOTES
Name: Aden Becker      : 1960      MRN: [id number]  Encounter Provider: ANA Christianson  Encounter Date: 4/3/2023   Encounter department: [address] [place] [address], [facility]  A1C is at 8 5  Will start on Trulicity and stop her Januvia  This was most likely need an Onnie Mons  Did review other labs  Continues to see Cardiology and GYN  Will renew her creams and powder  Will bring her back in one month for a follow up or sooner if need be  1  Type 2 diabetes mellitus with hyperosmolarity without coma, without long-term current use of insulin (HCC)  -     dulaglutide (Trulicity) 4 38 MU/3 7IJ injection; Inject 0 5 mL (0 75 mg total) under the skin every 7 days  -     Comprehensive metabolic panel; Future  -     CBC and differential; Future  -     TSH, 3rd generation with Free T4 reflex; Future    2  Malignant neoplasm of ovary, unspecified laterality ([facility] [address] )  -     Comprehensive metabolic panel; Future  -     CBC and differential; Future  -     TSH, 3rd generation with Free T4 reflex; Future    3  Type 2 diabetes mellitus without complication, without long-term current use of insulin (HCC)  -     Comprehensive metabolic panel; Future  -     CBC and differential; Future  -     TSH, 3rd generation with Free T4 reflex; Future    4  Atherosclerosis of native coronary artery of native heart without angina pectoris  -     Comprehensive metabolic panel; Future  -     CBC and differential; Future  -     TSH, 3rd generation with Free T4 reflex; Future    5  Benign essential hypertension  -     Comprehensive metabolic panel; Future  -     CBC and differential; Future  -     TSH, 3rd generation with Free T4 reflex; Future    6  Acute pain of right shoulder  -     Comprehensive metabolic panel; Future  -     CBC and differential; Future  -     TSH, 3rd generation with Free T4 reflex; Future    7  Dyslipidemia  -     Comprehensive metabolic panel;  Future  -     CBC and differential; Future  - TSH, 3rd generation with Free T4 reflex; Future  -     Lipid panel; Future    8  Skin excoriation  -     fluconazole (DIFLUCAN) 150 mg tablet; Take 1 tablet (150 mg total) by mouth once for 1 dose    9  Rash  -     nystatin (MYCOSTATIN) powder; Apply topically 2 (two) times a day    10  Skin yeast infection  -     nystatin (MYCOSTATIN) cream; Apply topically 2 (two) times a day    11  Acute non-recurrent frontal sinusitis  -     fluticasone (FLONASE) 50 mcg/act nasal spray; 1 spray into each nostril daily           Allan Ng is for a follow up visit  She is doing well and did recently see Cardiology and GYN and everything was good  She states she did notice her A1C is up and is willing to try and injectable  She denies any chest pain, SOB, or palpitations  She is going for an eye exam coming up  She has not yet done her cologuard  She is having some excoriation under her breasts and would like her cream and powder called back in  She offers no other issues  Review of Systems   Skin: Positive for rash  All other systems reviewed and are negative  Below is the patient's most recent value for Albumin, ALT, AST, BUN, Calcium, Chloride, Cholesterol, CO2, Creatinine, GFR, Glucose, HDL, Hematocrit, Hemoglobin, Hemoglobin A1C, LDL, Magnesium, Phosphorus, Platelets, Potassium, PSA, Sodium, Triglycerides, and WBC  Lab Results   Component Value Date    ALT 44 03/05/2023    AST 38 03/05/2023    BUN 13 03/05/2023    CALCIUM 8 9 03/05/2023     03/05/2023    CO2 24 03/05/2023    CREATININE 0 98 03/05/2023    HDL 36 (L) 03/05/2023    HCT 42 8 03/05/2023    HGB 13 8 03/05/2023    HGBA1C 8 5 (H) 03/05/2023     03/05/2023    K 3 9 03/05/2023    TRIG 116 03/05/2023    WBC 11 46 (H) 03/05/2023     Note: for a comprehensive list of the patient's lab results, access the Results Review activity    Current Outpatient Medications on File Prior to Visit   Medication Sig   • acetaminophen (TYLENOL) 325 mg "tablet Take 3 tablets (975 mg total) by mouth every 8 (eight) hours   • aspirin (ECOTRIN LOW STRENGTH) 81 mg EC tablet Take 81 mg by mouth daily   • carvedilol (COREG) 25 mg tablet Take 1 tablet (25 mg total) by mouth 2 (two) times a day with meals   • cetirizine (ZyrTEC) 10 mg tablet Take 1 tablet (10 mg total) by mouth daily   • Cholecalciferol (VITAMIN D3 PO) Take 2,000 Units by mouth 2 (two) times a day   • cyanocobalamin 2000 MCG tablet Take 2,000 mcg by mouth daily   • glipiZIDE (GLUCOTROL) 5 mg tablet Take 1 tablet (5 mg total) by mouth daily   • Glucose Blood (ONETOUCH TEST VI) by In Vitro route   • Lancets (OneTouch Delica Plus [id number]) MISC use 1 LANCET to TEST BLOOD SUGAR three times a day   • metFORMIN (GLUCOPHAGE) 500 mg tablet take 2 tablets by mouth every morning and 2 tablets by mouth every evening   • Multiple Vitamin-Folic Acid TABS Take by mouth   • nitroglycerin (NITROSTAT) 0 4 mg SL tablet Place 1 tablet (0 4 mg total) under the tongue every 5 (five) minutes as needed for chest pain   • ondansetron (ZOFRAN) 4 mg tablet Take 1 tablet (4 mg total) by mouth every 8 (eight) hours as needed for nausea or vomiting   • rosuvastatin (CRESTOR) 40 MG tablet take 1 tablet by mouth once daily   • [DISCONTINUED] fluticasone (FLONASE) 50 mcg/act nasal spray 1 spray into each nostril daily   • [DISCONTINUED] Januvia 100 MG tablet take 1 tablet by mouth daily   • [DISCONTINUED] nystatin (MYCOSTATIN) cream Apply topically 2 (two) times a day   • [DISCONTINUED] nystatin (MYCOSTATIN) powder Apply topically 2 (two) times a day   • glucose blood (OneTouch Verio) test strip use 1 TEST STRIP to TEST BLOOD SUGAR three times a day   • losartan (COZAAR) 100 MG tablet take 1 tablet by mouth once daily   • [DISCONTINUED] aspirin 325 mg tablet Take 325 mg by mouth daily       Objective     /80   Pulse 90   Temp 97 6 °F (36 4 °C) (Temporal)   Ht 5' 3\" (1 6 m)   Wt 95 6 kg (210 lb 11 2 oz)   SpO2 95%   BMI 37 32 " kg/m²     Physical Exam  Vitals reviewed  Constitutional:       Appearance: Normal appearance  She is normal weight  HENT:      Head: Normocephalic and atraumatic  Right Ear: Tympanic membrane, ear canal and external ear normal       Left Ear: Tympanic membrane, ear canal and external ear normal       Nose: Nose normal       Mouth/Throat:      Mouth: Mucous membranes are moist       Pharynx: Oropharynx is clear  Eyes:      Extraocular Movements: Extraocular movements intact  Conjunctiva/sclera: Conjunctivae normal       Pupils: Pupils are equal, round, and reactive to light  Cardiovascular:      Rate and Rhythm: Normal rate and regular rhythm  Pulses: Normal pulses  Heart sounds: Normal heart sounds  Pulmonary:      Effort: Pulmonary effort is normal       Breath sounds: Normal breath sounds  Abdominal:      General: Abdomen is flat  Bowel sounds are normal       Palpations: Abdomen is soft  Musculoskeletal:         General: Normal range of motion  Cervical back: Normal range of motion and neck supple  Skin:     General: Skin is warm and dry  Capillary Refill: Capillary refill takes less than 2 seconds  Neurological:      General: No focal deficit present  Mental Status: She is alert and oriented to person, place, and time  Mental status is at baseline  Psychiatric:         Mood and Affect: Mood normal          Behavior: Behavior normal          Thought Content:  Thought content normal          Judgment: Judgment normal        ANA Parnell

## 2023-04-25 ENCOUNTER — VBI (OUTPATIENT)
Dept: ADMINISTRATIVE | Facility: OTHER | Age: 63
End: 2023-04-25

## 2023-04-27 DIAGNOSIS — I10 ESSENTIAL HYPERTENSION: ICD-10-CM

## 2023-04-27 DIAGNOSIS — E78.5 DYSLIPIDEMIA: ICD-10-CM

## 2023-04-27 RX ORDER — LOSARTAN POTASSIUM 100 MG/1
TABLET ORAL
Qty: 90 TABLET | Refills: 3 | Status: SHIPPED | OUTPATIENT
Start: 2023-04-27

## 2023-04-27 RX ORDER — ROSUVASTATIN CALCIUM 40 MG/1
TABLET, COATED ORAL
Qty: 90 TABLET | Refills: 3 | Status: SHIPPED | OUTPATIENT
Start: 2023-04-27

## 2023-04-28 ENCOUNTER — TELEPHONE (OUTPATIENT)
Dept: INTERNAL MEDICINE CLINIC | Facility: CLINIC | Age: 63
End: 2023-04-28

## 2023-04-30 DIAGNOSIS — E11.00 TYPE 2 DIABETES MELLITUS WITH HYPEROSMOLARITY WITHOUT COMA, WITHOUT LONG-TERM CURRENT USE OF INSULIN (HCC): ICD-10-CM

## 2023-05-10 DIAGNOSIS — E11.9 TYPE 2 DIABETES MELLITUS WITHOUT COMPLICATION, WITHOUT LONG-TERM CURRENT USE OF INSULIN (HCC): Primary | ICD-10-CM

## 2023-06-02 PROBLEM — J01.10 ACUTE NON-RECURRENT FRONTAL SINUSITIS: Status: RESOLVED | Noted: 2023-04-03 | Resolved: 2023-06-02

## 2023-06-29 ENCOUNTER — APPOINTMENT (OUTPATIENT)
Dept: LAB | Facility: HOSPITAL | Age: 63
End: 2023-06-29
Payer: COMMERCIAL

## 2023-06-29 DIAGNOSIS — M25.511 ACUTE PAIN OF RIGHT SHOULDER: ICD-10-CM

## 2023-06-29 DIAGNOSIS — C56.9 MALIGNANT NEOPLASM OF OVARY, UNSPECIFIED LATERALITY (HCC): ICD-10-CM

## 2023-06-29 DIAGNOSIS — E11.00 TYPE 2 DIABETES MELLITUS WITH HYPEROSMOLARITY WITHOUT COMA, WITHOUT LONG-TERM CURRENT USE OF INSULIN (HCC): ICD-10-CM

## 2023-06-29 DIAGNOSIS — I25.10 ATHEROSCLEROSIS OF NATIVE CORONARY ARTERY OF NATIVE HEART WITHOUT ANGINA PECTORIS: ICD-10-CM

## 2023-06-29 DIAGNOSIS — E78.5 DYSLIPIDEMIA: ICD-10-CM

## 2023-06-29 DIAGNOSIS — I10 BENIGN ESSENTIAL HYPERTENSION: ICD-10-CM

## 2023-06-29 DIAGNOSIS — E11.9 TYPE 2 DIABETES MELLITUS WITHOUT COMPLICATION, WITHOUT LONG-TERM CURRENT USE OF INSULIN (HCC): ICD-10-CM

## 2023-06-29 LAB
ALBUMIN SERPL BCP-MCNC: 4.3 G/DL (ref 3.5–5)
ALP SERPL-CCNC: 94 U/L (ref 34–104)
ALT SERPL W P-5'-P-CCNC: 31 U/L (ref 7–52)
ANION GAP SERPL CALCULATED.3IONS-SCNC: 10 MMOL/L
AST SERPL W P-5'-P-CCNC: 27 U/L (ref 13–39)
BASOPHILS # BLD AUTO: 0.07 THOUSANDS/ÂΜL (ref 0–0.1)
BASOPHILS NFR BLD AUTO: 1 % (ref 0–1)
BILIRUB SERPL-MCNC: 0.34 MG/DL (ref 0.2–1)
BUN SERPL-MCNC: 8 MG/DL (ref 5–25)
CALCIUM SERPL-MCNC: 9.1 MG/DL (ref 8.4–10.2)
CHLORIDE SERPL-SCNC: 105 MMOL/L (ref 96–108)
CHOLEST SERPL-MCNC: 83 MG/DL
CO2 SERPL-SCNC: 22 MMOL/L (ref 21–32)
CREAT SERPL-MCNC: 0.96 MG/DL (ref 0.6–1.3)
EOSINOPHIL # BLD AUTO: 0.19 THOUSAND/ÂΜL (ref 0–0.61)
EOSINOPHIL NFR BLD AUTO: 2 % (ref 0–6)
ERYTHROCYTE [DISTWIDTH] IN BLOOD BY AUTOMATED COUNT: 15.6 % (ref 11.6–15.1)
GFR SERPL CREATININE-BSD FRML MDRD: 63 ML/MIN/1.73SQ M
GLUCOSE P FAST SERPL-MCNC: 176 MG/DL (ref 65–99)
HCT VFR BLD AUTO: 41.4 % (ref 34.8–46.1)
HDLC SERPL-MCNC: 35 MG/DL
HGB BLD-MCNC: 13.2 G/DL (ref 11.5–15.4)
IMM GRANULOCYTES # BLD AUTO: 0.05 THOUSAND/UL (ref 0–0.2)
IMM GRANULOCYTES NFR BLD AUTO: 0 % (ref 0–2)
LDLC SERPL CALC-MCNC: 20 MG/DL (ref 0–100)
LYMPHOCYTES # BLD AUTO: 3.11 THOUSANDS/ÂΜL (ref 0.6–4.47)
LYMPHOCYTES NFR BLD AUTO: 25 % (ref 14–44)
MCH RBC QN AUTO: 27.8 PG (ref 26.8–34.3)
MCHC RBC AUTO-ENTMCNC: 31.9 G/DL (ref 31.4–37.4)
MCV RBC AUTO: 87 FL (ref 82–98)
MONOCYTES # BLD AUTO: 0.77 THOUSAND/ÂΜL (ref 0.17–1.22)
MONOCYTES NFR BLD AUTO: 6 % (ref 4–12)
NEUTROPHILS # BLD AUTO: 8.11 THOUSANDS/ÂΜL (ref 1.85–7.62)
NEUTS SEG NFR BLD AUTO: 66 % (ref 43–75)
NONHDLC SERPL-MCNC: 48 MG/DL
NRBC BLD AUTO-RTO: 0 /100 WBCS
PLATELET # BLD AUTO: 288 THOUSANDS/UL (ref 149–390)
PMV BLD AUTO: 9.8 FL (ref 8.9–12.7)
POTASSIUM SERPL-SCNC: 3.9 MMOL/L (ref 3.5–5.3)
PROT SERPL-MCNC: 7.1 G/DL (ref 6.4–8.4)
RBC # BLD AUTO: 4.74 MILLION/UL (ref 3.81–5.12)
SODIUM SERPL-SCNC: 137 MMOL/L (ref 135–147)
TRIGL SERPL-MCNC: 141 MG/DL
TSH SERPL DL<=0.05 MIU/L-ACNC: 1.68 UIU/ML (ref 0.45–4.5)
WBC # BLD AUTO: 12.3 THOUSAND/UL (ref 4.31–10.16)

## 2023-06-29 PROCEDURE — 36415 COLL VENOUS BLD VENIPUNCTURE: CPT

## 2023-06-29 PROCEDURE — 84443 ASSAY THYROID STIM HORMONE: CPT

## 2023-06-29 PROCEDURE — 85025 COMPLETE CBC W/AUTO DIFF WBC: CPT

## 2023-06-29 PROCEDURE — 80053 COMPREHEN METABOLIC PANEL: CPT

## 2023-06-29 PROCEDURE — 86336 INHIBIN A: CPT

## 2023-06-29 PROCEDURE — 83520 IMMUNOASSAY QUANT NOS NONAB: CPT

## 2023-06-29 PROCEDURE — 80061 LIPID PANEL: CPT

## 2023-07-03 LAB — INHIBIN B SERPL-MCNC: <7 PG/ML (ref 0–16.9)

## 2023-07-06 ENCOUNTER — OFFICE VISIT (OUTPATIENT)
Dept: GYNECOLOGIC ONCOLOGY | Facility: CLINIC | Age: 63
End: 2023-07-06
Payer: COMMERCIAL

## 2023-07-06 VITALS
HEIGHT: 63 IN | TEMPERATURE: 97.5 F | HEART RATE: 104 BPM | SYSTOLIC BLOOD PRESSURE: 154 MMHG | OXYGEN SATURATION: 95 % | WEIGHT: 205 LBS | DIASTOLIC BLOOD PRESSURE: 76 MMHG | BODY MASS INDEX: 36.32 KG/M2

## 2023-07-06 DIAGNOSIS — C56.9 MALIGNANT NEOPLASM OF OVARY, UNSPECIFIED LATERALITY (HCC): ICD-10-CM

## 2023-07-06 DIAGNOSIS — Z85.43 HISTORY OF OVARIAN CANCER: Primary | ICD-10-CM

## 2023-07-06 PROCEDURE — 99213 OFFICE O/P EST LOW 20 MIN: CPT | Performed by: OBSTETRICS & GYNECOLOGY

## 2023-07-06 NOTE — PROGRESS NOTES
Assessment/Plan:    Problem List Items Addressed This Visit        Endocrine    Ovarian cancer [facility])     Patient is a very pleasant 20-year-old female with a history of 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 treated with surgery alone in approximately December 2021. She has no evidence of recurrence of disease however inhibin a is still pending. Patient will follow-up in 3 months for repeat evaluation with inhibin a and inhibin B. She is not up-to-date with mammograms. The patient would prefer her family doctor put this and will see them shortly. Other Visit Diagnoses     History of ovarian cancer    -  Primary    Relevant Orders    Inhibin A    Inhibin B            CHIEF COMPLAINT: Surveillance ovarian cancer      Problem:  Cancer Staging   Ovarian cancer ([address])  Staging form: Ovary, Fallopian Tube, Primary Peritoneal, AJCC 8th Edition  - Clinical: FIGO Stage IC2, calculated as Stage IC (cT1c2, cN0, cM0) - Signed by Bonifacio Clarke MD on 3/7/2022  - Pathologic stage from 3/7/2022: FIGO Stage IC2, calculated as Stage Unknown (pT1c2, pNX, cM0) - Signed by Bonifacio Clarke MD on 3/7/2022        Previous therapy:  Oncology History   Ovarian cancer ([address])   12/2021 Surgery    TAHBSO for fibroid uterus of 24 weeks with an incidental finding of a 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 Surgery performed by Phill Ryan     3/7/2022 Initial Diagnosis    Ovarian cancer ([address])     3/7/2022 -  Cancer Staged    Staging form: Ovary, Fallopian Tube, Primary Peritoneal, AJCC 8th Edition  - Clinical: FIGO Stage IC2, calculated as Stage IC (cT1c2, cN0, cM0) - Signed by Bonifacio Clarke MD on 3/7/2022       3/7/2022 -  Cancer Staged    Staging form: Ovary, Fallopian Tube, Primary Peritoneal, AJCC 8th Edition  - Pathologic stage from 3/7/2022:  FIGO Stage IC2, calculated as Stage Unknown (pT1c2, pNX, cM0) - Signed by Bonifacio Clarke MD on 3/7/2022  Histopathologic type: Granulosa cell tumor, adult type  Stage prefix: Initial diagnosis  Histologic grade (G): G1  Histologic grading system: 4 grade system  Residual tumor (R): R0 - None             Patient ID: Paulino Chaney is a 58 y.o. female  Patient is a very pleasant 58-year-old female with a history of ROMÁN/BSO for fibroid uterus at 24 weeks size with incidental identification of a 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. This was performed in December 2021. The patient has been observed since that time. Her inhibin B remains less than 7. Her inhibin a is not yet back. The patient continues to note some hot flashes. Aside from this she is doing well. Today, the patient is doing well. She denies significant abdominal pain, pelvic pain, nausea, vomiting, constipation, diarrhea, fevers, chills, or vaginal bleeding. The following portions of the patient's history were reviewed and updated as appropriate: allergies, current medications, past family history, past social history, past surgical history and problem list.    Review of Systems   Constitutional: Negative. HENT: Negative. Eyes: Negative. Respiratory: Negative. Cardiovascular: Negative. Gastrointestinal: Negative. Endocrine: Negative. Genitourinary: Negative. Musculoskeletal: Negative. Skin: Negative. Neurological: Negative. Hematological: Negative. Psychiatric/Behavioral: Negative.         Current Outpatient Medications   Medication Sig Dispense Refill   • aspirin (ECOTRIN LOW STRENGTH) 81 mg EC tablet Take 81 mg by mouth daily     • carvedilol (COREG) 25 mg tablet Take 1 tablet (25 mg total) by mouth 2 (two) times a day with meals 180 tablet 3   • cetirizine (ZyrTEC) 10 mg tablet Take 1 tablet (10 mg total) by mouth daily 30 tablet 5   • Cholecalciferol (VITAMIN D3 PO) Take 2,000 Units by mouth 2 (two) times a day     • cyanocobalamin 2000 MCG tablet Take 2,000 mcg by mouth daily     • fluticasone (FLONASE) 50 mcg/act nasal spray 1 spray into each nostril daily 15.8 mL 5   • glipiZIDE (GLUCOTROL) 5 mg tablet Take 1 tablet (5 mg total) by mouth daily 30 tablet 5   • Glucose Blood (ONETOUCH TEST VI) by In Vitro route     • glucose blood (OneTouch Verio) test strip use 1 TEST STRIP to TEST BLOOD SUGAR three times a day 100 strip 0   • Lancets (OneTouch Delica Plus [id number]) MISC use 1 LANCET to TEST BLOOD SUGAR three times a day 100 each 3   • losartan (COZAAR) 100 MG tablet take 1 tablet by mouth once daily 90 tablet 3   • metFORMIN (GLUCOPHAGE) 500 mg tablet take 2 tablets by mouth every morning and 2 tablets by mouth every evening 120 tablet 5   • Multiple Vitamin-Folic Acid TABS Take by mouth     • nitroglycerin (NITROSTAT) 0.4 mg SL tablet Place 1 tablet (0.4 mg total) under the tongue every 5 (five) minutes as needed for chest pain 30 tablet 3   • nystatin (MYCOSTATIN) cream Apply topically 2 (two) times a day 30 g 0   • nystatin (MYCOSTATIN) powder Apply topically 2 (two) times a day 60 g 3   • ondansetron (ZOFRAN) 4 mg tablet Take 1 tablet (4 mg total) by mouth every 8 (eight) hours as needed for nausea or vomiting 20 tablet 2   • rosuvastatin (CRESTOR) 40 MG tablet take 1 tablet by mouth once daily 90 tablet 3   • sitaGLIPtin (JANUVIA) 100 mg tablet Take 1 tablet (100 mg total) by mouth daily 30 tablet 3   • acetaminophen (TYLENOL) 325 mg tablet Take 3 tablets (975 mg total) by mouth every 8 (eight) hours       No current facility-administered medications for this visit. Objective:    Blood pressure 154/76, pulse 104, temperature 97.5 °F (36.4 °C), temperature source Temporal, height 5' 3" (1.6 m), weight 93 kg (205 lb), SpO2 95 %, unknown if currently breastfeeding. Body mass index is 36.31 kg/m². Body surface area is 1.95 meters squared. Physical Exam  Constitutional:       Appearance: She is well-developed. HENT:      Head: Normocephalic and atraumatic. Neck:      Thyroid: No thyromegaly.    Cardiovascular:      Rate and Rhythm: Normal rate and regular rhythm. Heart sounds: Normal heart sounds. Pulmonary:      Effort: Pulmonary effort is normal.      Breath sounds: Normal breath sounds. Abdominal:      General: Bowel sounds are normal.      Palpations: Abdomen is soft. Comments: Well healed midline incisions. Genitourinary:     Comments: -Normal external female genitalia, normal Bartholin's and [place]'s glands                  -Normal midline urethral meatus. No lesions notes                  -Bladder without fullness mass or tenderness                  -Vagina without lesion or discharge No significant cystocele or rectocele noted                  -Cervix surgically absent                  -Uterus surgically absent                  -Adnexae surgically absent                  - Anus without fissure of lesion    Musculoskeletal:         General: Normal range of motion. Cervical back: Normal range of motion and neck supple. Lymphadenopathy:      Cervical: No cervical adenopathy. Skin:     General: Skin is warm and dry. Neurological:      Mental Status: She is alert and oriented to person, place, and time.    Psychiatric:         Behavior: Behavior normal.         Lab Results   Component Value Date     6.1 01/19/2022     Lab Results   Component Value Date    K 3.9 06/29/2023     06/29/2023    CO2 22 06/29/2023    BUN 8 06/29/2023    CREATININE 0.96 06/29/2023    GLUF 176 (H) 06/29/2023    CALCIUM 9.1 06/29/2023    AST 27 06/29/2023    ALT 31 06/29/2023    ALKPHOS 94 06/29/2023    EGFR 63 06/29/2023     Lab Results   Component Value Date    WBC 12.30 (H) 06/29/2023    HGB 13.2 06/29/2023    HCT 41.4 06/29/2023    MCV 87 06/29/2023     06/29/2023     Lab Results   Component Value Date    NEUTROABS 8.11 (H) 06/29/2023        Trend:  Lab Results   Component Value Date     6.1 01/19/2022

## 2023-07-06 NOTE — LETTER
July 6, 2023     [facility] [id number]    Patient: Anahi Edwards   YOB: 1960   Date of Visit: 7/6/2023       Dear Dr. Shantel Lozano: Thank you for referring Anahi Edwards to me for evaluation. Below are my notes for this consultation. If you have questions, please do not hesitate to call me. I look forward to following your patient along with you. Sincerely,        Ana Cohn MD        CC: MD Ana Penn MD  7/6/2023 11:09 AM  Incomplete  Assessment/Plan:    Problem List Items Addressed This Visit    None  Visit Diagnoses       History of ovarian cancer    -  Primary    Relevant Orders    Inhibin A    Inhibin B              CHIEF COMPLAINT: Surveillance ovarian cancer      Problem:  Cancer Staging   Ovarian cancer ([address] [facility])  Staging form: Ovary, Fallopian Tube, Primary Peritoneal, AJCC 8th Edition  - Clinical: FIGO Stage IC2, calculated as Stage IC (cT1c2, cN0, cM0) - Signed by Ana Cohn MD on 3/7/2022  - Pathologic stage from 3/7/2022: FIGO Stage IC2, calculated as Stage Unknown (pT1c2, pNX, cM0) - Signed by Ana Cohn MD on 3/7/2022        Previous therapy:  Oncology History   Ovarian cancer ([address] [facility])   12/2021 Surgery    TAHBSO for fibroid uterus of 24 weeks with an incidental finding of a 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 Surgery performed by Shelley Lyme     3/7/2022 Initial Diagnosis    Ovarian cancer ([address] [facility])     3/7/2022 -  Cancer Staged    Staging form: Ovary, Fallopian Tube, Primary Peritoneal, AJCC 8th Edition  - Clinical: FIGO Stage IC2, calculated as Stage IC (cT1c2, cN0, cM0) - Signed by Ana Cohn MD on 3/7/2022       3/7/2022 -  Cancer Staged    Staging form: Ovary, Fallopian Tube, Primary Peritoneal, AJCC 8th Edition  - Pathologic stage from 3/7/2022:  FIGO Stage IC2, calculated as Stage Unknown (pT1c2, pNX, cM0) - Signed by Ana Cohn MD on 3/7/2022  Histopathologic type: Granulosa cell tumor, adult type  Stage prefix: Initial diagnosis  Histologic grade (G): G1  Histologic grading system: 4 grade system  Residual tumor (R): R0 - None             Patient ID: Ashok Van is a 58 y.o. female  Patient is a very pleasant 61-year-old female with a history of ROMÁN/BSO for fibroid uterus at 24 weeks size with incidental identification of a 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. This was performed in December 2021. The patient has been observed since that time. Her inhibin B remains less than 7. Her inhibin a is not yet back. The patient continues to note some hot flashes. Aside from this she is doing well. Today, the patient is doing well. She denies significant abdominal pain, pelvic pain, nausea, vomiting, constipation, diarrhea, fevers, chills, or vaginal bleeding. The following portions of the patient's history were reviewed and updated as appropriate: allergies, current medications, past family history, past social history, past surgical history and problem list.    Review of Systems   Constitutional: Negative. HENT: Negative. Eyes: Negative. Respiratory: Negative. Cardiovascular: Negative. Gastrointestinal: Negative. Endocrine: Negative. Genitourinary: Negative. Musculoskeletal: Negative. Skin: Negative. Neurological: Negative. Hematological: Negative. Psychiatric/Behavioral: Negative.         Current Outpatient Medications   Medication Sig Dispense Refill   • aspirin (ECOTRIN LOW STRENGTH) 81 mg EC tablet Take 81 mg by mouth daily     • carvedilol (COREG) 25 mg tablet Take 1 tablet (25 mg total) by mouth 2 (two) times a day with meals 180 tablet 3   • cetirizine (ZyrTEC) 10 mg tablet Take 1 tablet (10 mg total) by mouth daily 30 tablet 5   • Cholecalciferol (VITAMIN D3 PO) Take 2,000 Units by mouth 2 (two) times a day     • cyanocobalamin 2000 MCG tablet Take 2,000 mcg by mouth daily     • fluticasone (FLONASE) 50 mcg/act nasal spray 1 spray into each nostril daily 15.8 mL 5   • glipiZIDE (GLUCOTROL) 5 mg tablet Take 1 tablet (5 mg total) by mouth daily 30 tablet 5   • Glucose Blood (ONETOUCH TEST VI) by In Vitro route     • glucose blood (OneTouch Verio) test strip use 1 TEST STRIP to TEST BLOOD SUGAR three times a day 100 strip 0   • Lancets (OneTouch Delica Plus [id number]) MISC use 1 LANCET to TEST BLOOD SUGAR three times a day 100 each 3   • losartan (COZAAR) 100 MG tablet take 1 tablet by mouth once daily 90 tablet 3   • metFORMIN (GLUCOPHAGE) 500 mg tablet take 2 tablets by mouth every morning and 2 tablets by mouth every evening 120 tablet 5   • Multiple Vitamin-Folic Acid TABS Take by mouth     • nitroglycerin (NITROSTAT) 0.4 mg SL tablet Place 1 tablet (0.4 mg total) under the tongue every 5 (five) minutes as needed for chest pain 30 tablet 3   • nystatin (MYCOSTATIN) cream Apply topically 2 (two) times a day 30 g 0   • nystatin (MYCOSTATIN) powder Apply topically 2 (two) times a day 60 g 3   • ondansetron (ZOFRAN) 4 mg tablet Take 1 tablet (4 mg total) by mouth every 8 (eight) hours as needed for nausea or vomiting 20 tablet 2   • rosuvastatin (CRESTOR) 40 MG tablet take 1 tablet by mouth once daily 90 tablet 3   • sitaGLIPtin (JANUVIA) 100 mg tablet Take 1 tablet (100 mg total) by mouth daily 30 tablet 3   • acetaminophen (TYLENOL) 325 mg tablet Take 3 tablets (975 mg total) by mouth every 8 (eight) hours       No current facility-administered medications for this visit. Objective:    Blood pressure 154/76, pulse 104, temperature 97.5 °F (36.4 °C), temperature source Temporal, height 5' 3" (1.6 m), weight 93 kg (205 lb), SpO2 95 %, unknown if currently breastfeeding. Body mass index is 36.31 kg/m². Body surface area is 1.95 meters squared. Physical Exam  Constitutional:       Appearance: She is well-developed. HENT:      Head: Normocephalic and atraumatic.    Neck: Thyroid: No thyromegaly. Cardiovascular:      Rate and Rhythm: Normal rate and regular rhythm. Heart sounds: Normal heart sounds. Pulmonary:      Effort: Pulmonary effort is normal.      Breath sounds: Normal breath sounds. Abdominal:      General: Bowel sounds are normal.      Palpations: Abdomen is soft. Comments: Well healed midline incisions. Genitourinary:     Comments: -Normal external female genitalia, normal Bartholin's and Benitez's glands                  -Normal midline urethral meatus. No lesions notes                  -Bladder without fullness mass or tenderness                  -Vagina without lesion or discharge No significant cystocele or rectocele noted                  -Cervix surgically absent                  -Uterus surgically absent                  -Adnexae surgically absent                  - Anus without fissure of lesion    Musculoskeletal:         General: Normal range of motion. Cervical back: Normal range of motion and neck supple. Lymphadenopathy:      Cervical: No cervical adenopathy. Skin:     General: Skin is warm and dry. Neurological:      Mental Status: She is alert and oriented to person, place, and time.    Psychiatric:         Behavior: Behavior normal.         Lab Results   Component Value Date     6.1 01/19/2022     Lab Results   Component Value Date    K 3.9 06/29/2023     06/29/2023    CO2 22 06/29/2023    BUN 8 06/29/2023    CREATININE 0.96 06/29/2023    GLUF 176 (H) 06/29/2023    CALCIUM 9.1 06/29/2023    AST 27 06/29/2023    ALT 31 06/29/2023    ALKPHOS 94 06/29/2023    EGFR 63 06/29/2023     Lab Results   Component Value Date    WBC 12.30 (H) 06/29/2023    HGB 13.2 06/29/2023    HCT 41.4 06/29/2023    MCV 87 06/29/2023     06/29/2023     Lab Results   Component Value Date    NEUTROABS 8.11 (H) 06/29/2023        Trend:  Lab Results   Component Value Date     6.1 01/19/2022

## 2023-07-06 NOTE — ASSESSMENT & PLAN NOTE
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 She has no evidence of recurrence of disease however inhibin a is still pending. Patient will follow-up in 3 months for repeat evaluation with inhibin a and inhibin B. She is not up-to-date with mammograms. The patient would prefer her family doctor put this and will see them shortly.

## 2023-07-07 LAB — INHIBIN A SERPL-MCNC: <0.3 PG/ML

## 2023-07-24 DIAGNOSIS — E11.9 TYPE 2 DIABETES MELLITUS WITHOUT COMPLICATION, WITHOUT LONG-TERM CURRENT USE OF INSULIN (HCC): ICD-10-CM

## 2023-07-25 RX ORDER — BLOOD SUGAR DIAGNOSTIC
STRIP MISCELLANEOUS
Qty: 100 STRIP | Refills: 0 | Status: SHIPPED | OUTPATIENT
Start: 2023-07-25

## 2023-09-03 DIAGNOSIS — E11.9 TYPE 2 DIABETES MELLITUS WITHOUT COMPLICATION, WITHOUT LONG-TERM CURRENT USE OF INSULIN (HCC): ICD-10-CM

## 2023-09-05 RX ORDER — SITAGLIPTIN 100 MG/1
100 TABLET, FILM COATED ORAL DAILY
Qty: 30 TABLET | Refills: 3 | Status: SHIPPED | OUTPATIENT
Start: 2023-09-05

## 2023-09-17 DIAGNOSIS — E11.9 TYPE 2 DIABETES MELLITUS WITHOUT COMPLICATION, WITHOUT LONG-TERM CURRENT USE OF INSULIN (HCC): ICD-10-CM

## 2023-09-27 ENCOUNTER — APPOINTMENT (OUTPATIENT)
Dept: LAB | Facility: HOSPITAL | Age: 63
End: 2023-09-27
Payer: COMMERCIAL

## 2023-09-27 DIAGNOSIS — Z85.43 HISTORY OF OVARIAN CANCER: ICD-10-CM

## 2023-09-27 PROCEDURE — 86336 INHIBIN A: CPT

## 2023-09-27 PROCEDURE — 83520 IMMUNOASSAY QUANT NOS NONAB: CPT

## 2023-09-29 DIAGNOSIS — E11.00 TYPE 2 DIABETES MELLITUS WITH HYPEROSMOLARITY WITHOUT COMA, WITHOUT LONG-TERM CURRENT USE OF INSULIN (HCC): ICD-10-CM

## 2023-09-29 LAB — INHIBIN B SERPL-MCNC: <7 PG/ML (ref 0–16.9)

## 2023-09-29 RX ORDER — GLIPIZIDE 5 MG/1
5 TABLET ORAL DAILY
Qty: 30 TABLET | Refills: 5 | Status: SHIPPED | OUTPATIENT
Start: 2023-09-29 | End: 2023-10-03 | Stop reason: SDUPTHER

## 2023-10-03 ENCOUNTER — OFFICE VISIT (OUTPATIENT)
Dept: INTERNAL MEDICINE CLINIC | Facility: CLINIC | Age: 63
End: 2023-10-03
Payer: COMMERCIAL

## 2023-10-03 VITALS
HEIGHT: 63 IN | HEART RATE: 85 BPM | WEIGHT: 203.4 LBS | DIASTOLIC BLOOD PRESSURE: 82 MMHG | TEMPERATURE: 98.5 F | BODY MASS INDEX: 36.04 KG/M2 | SYSTOLIC BLOOD PRESSURE: 146 MMHG | OXYGEN SATURATION: 96 % | RESPIRATION RATE: 18 BRPM

## 2023-10-03 DIAGNOSIS — I44.7 LEFT BUNDLE BRANCH BLOCK (LBBB): ICD-10-CM

## 2023-10-03 DIAGNOSIS — E78.5 DYSLIPIDEMIA: ICD-10-CM

## 2023-10-03 DIAGNOSIS — Z23 NEED FOR INFLUENZA VACCINATION: ICD-10-CM

## 2023-10-03 DIAGNOSIS — C56.9 MALIGNANT NEOPLASM OF OVARY, UNSPECIFIED LATERALITY (HCC): Primary | ICD-10-CM

## 2023-10-03 DIAGNOSIS — E11.00 TYPE 2 DIABETES MELLITUS WITH HYPEROSMOLARITY WITHOUT COMA, WITHOUT LONG-TERM CURRENT USE OF INSULIN (HCC): ICD-10-CM

## 2023-10-03 DIAGNOSIS — I10 BENIGN ESSENTIAL HYPERTENSION: ICD-10-CM

## 2023-10-03 PROBLEM — T14.8XXA SKIN EXCORIATION: Status: RESOLVED | Noted: 2023-04-03 | Resolved: 2023-10-03

## 2023-10-03 PROBLEM — B37.2 SKIN YEAST INFECTION: Status: RESOLVED | Noted: 2022-07-06 | Resolved: 2023-10-03

## 2023-10-03 PROBLEM — R21 RASH: Status: RESOLVED | Noted: 2023-04-03 | Resolved: 2023-10-03

## 2023-10-03 LAB
INHIBIN A SERPL-MCNC: 1.1 PG/ML
SL AMB POCT HEMOGLOBIN AIC: 8.1 (ref ?–6.5)

## 2023-10-03 PROCEDURE — 83036 HEMOGLOBIN GLYCOSYLATED A1C: CPT | Performed by: NURSE PRACTITIONER

## 2023-10-03 PROCEDURE — 90686 IIV4 VACC NO PRSV 0.5 ML IM: CPT | Performed by: NURSE PRACTITIONER

## 2023-10-03 PROCEDURE — 90471 IMMUNIZATION ADMIN: CPT | Performed by: NURSE PRACTITIONER

## 2023-10-03 PROCEDURE — 99214 OFFICE O/P EST MOD 30 MIN: CPT | Performed by: NURSE PRACTITIONER

## 2023-10-03 RX ORDER — GLIPIZIDE 5 MG/1
5 TABLET ORAL
Qty: 60 TABLET | Refills: 3 | Status: SHIPPED | OUTPATIENT
Start: 2023-10-03

## 2023-10-03 RX ORDER — GLIPIZIDE 5 MG/1
5 TABLET ORAL DAILY
Qty: 30 TABLET | Refills: 5 | Status: SHIPPED | OUTPATIENT
Start: 2023-10-03 | End: 2023-10-03

## 2023-10-03 NOTE — PROGRESS NOTES
Name: Rosie Lorenzo      : 1960      MRN: [id number]  Encounter Provider: ANA Tyler  Encounter Date: 10/3/2023   Encounter department: [address] [facility] A1C is at 8.1. Will increase her Glipidize to 5 mg BID. Will continue to monitor her sugars. Will go for her mammogram. Foot exam is normal. Will give Flu vaccine. Will have eye exam done. Will bring her back in 3 months or sooner if need be. 1. Malignant neoplasm of ovary, unspecified laterality ([address])    2. Type 2 diabetes mellitus with hyperosmolarity without coma, without long-term current use of insulin ([facility])  -     POCT hemoglobin A1c  -     glipiZIDE (GLUCOTROL) 5 mg tablet; Take 1 tablet (5 mg total) by mouth 2 (two) times a day before meals    3. Left bundle branch block (LBBB)    4. Benign essential hypertension    5. Dyslipidemia    6. Need for influenza vaccination  -     influenza vaccine, quadrivalent, 0.5 mL, preservative-free, for adult and pediatric patients 6 mos+ (AFLURIA, FLUARIX, FLULAVAL, FLUZONE)           Allan Bolanos is for a follow up visit. She is doing well and is watching her diet and trying to do better with her sugars. She is taking her Metformin Januvia and Glipizide. She continues to see Hematology and Cardiology. She is due for a mammogram and eye exam. She did have labs done in . She would like a flu vaccine. She offers no other issues. Review of Systems   All other systems reviewed and are negative. Below is the patient's most recent value for Albumin, ALT, AST, BUN, Calcium, Chloride, Cholesterol, CO2, Creatinine, GFR, Glucose, HDL, Hematocrit, Hemoglobin, Hemoglobin A1C, LDL, Magnesium, Phosphorus, Platelets, Potassium, PSA, Sodium, Triglycerides, and WBC.    Lab Results   Component Value Date    ALT 31 2023    AST 27 2023    BUN 8 2023    CALCIUM 9.1 2023     2023    CO2 22 2023    CREATININE 0.96 06/29/2023    HDL 35 (L) 06/29/2023    HCT 41.4 06/29/2023    HGB 13.2 06/29/2023    HGBA1C 8.1 (A) 10/03/2023     06/29/2023    K 3.9 06/29/2023    TRIG 141 06/29/2023    WBC 12.30 (H) 06/29/2023     Note: for a comprehensive list of the patient's lab results, access the Results Review activity.   Current Outpatient Medications on File Prior to Visit   Medication Sig   • aspirin (ECOTRIN LOW STRENGTH) 81 mg EC tablet Take 81 mg by mouth daily   • carvedilol (COREG) 25 mg tablet Take 1 tablet (25 mg total) by mouth 2 (two) times a day with meals   • cetirizine (ZyrTEC) 10 mg tablet Take 1 tablet (10 mg total) by mouth daily   • Cholecalciferol (VITAMIN D3 PO) Take 2,000 Units by mouth 2 (two) times a day   • cyanocobalamin 2000 MCG tablet Take 2,000 mcg by mouth daily   • fluticasone (FLONASE) 50 mcg/act nasal spray 1 spray into each nostril daily   • Glucose Blood (ONETOUCH TEST VI) by In Vitro route   • Januvia 100 MG tablet take 1 tablet by mouth once daily   • Lancets (OneTouch Delica Plus [id number]) MISC use 1 LANCET to TEST BLOOD SUGAR three times a day   • losartan (COZAAR) 100 MG tablet take 1 tablet by mouth once daily   • metFORMIN (GLUCOPHAGE) 500 mg tablet take 2 tablets by mouth every morning and 2 tablets by mouth every evening   • Multiple Vitamin-Folic Acid TABS Take by mouth   • nitroglycerin (NITROSTAT) 0.4 mg SL tablet Place 1 tablet (0.4 mg total) under the tongue every 5 (five) minutes as needed for chest pain   • nystatin (MYCOSTATIN) cream Apply topically 2 (two) times a day   • nystatin (MYCOSTATIN) powder Apply topically 2 (two) times a day   • ondansetron (ZOFRAN) 4 mg tablet Take 1 tablet (4 mg total) by mouth every 8 (eight) hours as needed for nausea or vomiting   • OneTouch Verio test strip use 1 TEST STRIP to TEST BLOOD SUGAR three times a day   • rosuvastatin (CRESTOR) 40 MG tablet take 1 tablet by mouth once daily   • [DISCONTINUED] glipiZIDE (GLUCOTROL) 5 mg tablet take 1 tablet by mouth once daily   • [DISCONTINUED] acetaminophen (TYLENOL) 325 mg tablet Take 3 tablets (975 mg total) by mouth every 8 (eight) hours     Patient's shoes and socks removed. Right Foot/Ankle   Right Foot Inspection  Skin Exam: skin normal and skin intact. No dry skin, no warmth, no callus, no erythema, no maceration, no abnormal color, no pre-ulcer, no ulcer and no callus. Toe Exam: ROM and strength within normal limits. Sensory   Vibration: intact  Proprioception: intact  Monofilament testing: intact    Vascular  Capillary refills: < 3 seconds  The right DP pulse is 2+. The right PT pulse is 2+. Left Foot/Ankle  Left Foot Inspection  Skin Exam: skin normal and skin intact. No dry skin, no warmth, no erythema, no maceration, normal color, no pre-ulcer, no ulcer and no callus. Toe Exam: ROM and strength within normal limits. Sensory   Vibration: intact  Proprioception: intact  Monofilament testing: intact    Vascular  Capillary refills: < 3 seconds  The left DP pulse is 2+. The left PT pulse is 2+. Assign Risk Category  No deformity present  No loss of protective sensation  No weak pulses  Risk: 0        Objective     /82   Pulse 85   Temp 98.5 °F (36.9 °C)   Resp 18   Ht 5' 3" (1.6 m)   Wt 92.3 kg (203 lb 6.4 oz)   SpO2 96%   BMI 36.03 kg/m²     Physical Exam  Vitals reviewed. Constitutional:       Appearance: Normal appearance. She is obese. HENT:      Head: Normocephalic and atraumatic. Right Ear: Tympanic membrane, ear canal and external ear normal.      Left Ear: Tympanic membrane, ear canal and external ear normal.      Nose: Nose normal.      Mouth/Throat:      Mouth: Mucous membranes are moist.      Pharynx: Oropharynx is clear. Eyes:      Extraocular Movements: Extraocular movements intact. Conjunctiva/sclera: Conjunctivae normal.      Pupils: Pupils are equal, round, and reactive to light.    Cardiovascular:      Rate and Rhythm: Normal rate and regular rhythm. Pulses: Normal pulses. no weak pulses          Dorsalis pedis pulses are 2+ on the right side and 2+ on the left side. Posterior tibial pulses are 2+ on the right side and 2+ on the left side. Heart sounds: Normal heart sounds. Pulmonary:      Effort: Pulmonary effort is normal.      Breath sounds: Normal breath sounds. Abdominal:      General: Abdomen is flat. Bowel sounds are normal.      Palpations: Abdomen is soft. Musculoskeletal:         General: Normal range of motion. Cervical back: Normal range of motion and neck supple. Feet:    Feet:      Right foot:      Skin integrity: No ulcer, skin breakdown, erythema, warmth, callus or dry skin. Left foot:      Skin integrity: No ulcer, skin breakdown, erythema, warmth, callus or dry skin. Skin:     General: Skin is warm and dry. Capillary Refill: Capillary refill takes less than 2 seconds. Neurological:      General: No focal deficit present. Mental Status: She is alert and oriented to person, place, and time. Mental status is at baseline. Psychiatric:         Mood and Affect: Mood normal.         Behavior: Behavior normal.         Thought Content:  Thought content normal.         Judgment: Judgment normal.       ANA Schroeder

## 2023-10-09 ENCOUNTER — OFFICE VISIT (OUTPATIENT)
Dept: GYNECOLOGIC ONCOLOGY | Facility: CLINIC | Age: 63
End: 2023-10-09
Payer: COMMERCIAL

## 2023-10-09 VITALS
TEMPERATURE: 97.5 F | SYSTOLIC BLOOD PRESSURE: 148 MMHG | HEIGHT: 63 IN | OXYGEN SATURATION: 96 % | BODY MASS INDEX: 36.68 KG/M2 | DIASTOLIC BLOOD PRESSURE: 74 MMHG | WEIGHT: 207 LBS | RESPIRATION RATE: 18 BRPM | HEART RATE: 92 BPM

## 2023-10-09 DIAGNOSIS — C56.9 MALIGNANT NEOPLASM OF OVARY, UNSPECIFIED LATERALITY (HCC): Primary | ICD-10-CM

## 2023-10-09 PROCEDURE — 99213 OFFICE O/P EST LOW 20 MIN: CPT | Performed by: OBSTETRICS & GYNECOLOGY

## 2023-10-09 NOTE — LETTER
October 9, 2023     [address] [address]    Patient: Natty Madrigal   YOB: 1960   Date of Visit: 10/9/2023       Dear Dr. Carlton Chauhan: Thank you for referring Natty Madrigal to me for evaluation. Below are my notes for this consultation. If you have questions, please do not hesitate to call me. I look forward to following your patient along with you. 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 She has no evidence of recurrence of disease at this point. Her  has slightly increased however there is no trend. We recommended follow-up in 3 months with repeat inhibin levels at that time. Relevant Orders    Inhibin A    Inhibin B         CHIEF COMPLAINT: Follow-up ovarian cancer      Problem:  Cancer Staging   Ovarian cancer ([facility])  Staging form: Ovary, Fallopian Tube, Primary Peritoneal, AJCC 8th Edition  - Clinical: FIGO Stage IC2, calculated as Stage IC (cT1c2, cN0, cM0) - Signed by Effie Granados MD on 3/7/2022  - Pathologic stage from 3/7/2022: FIGO Stage IC2, calculated as Stage Unknown (pT1c2, pNX, cM0) - Signed by Effie Granados MD on 3/7/2022        Previous therapy:  Oncology History   Ovarian cancer ([address])   12/2021 Surgery    TAHBSO for fibroid uterus of 24 weeks with an incidental finding of a 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   Surgery performed by Emily Castrejon     3/7/2022 Initial Diagnosis    Ovarian cancer ([address] [facility])     3/7/2022 -  Cancer Staged    Staging form: Ovary, Fallopian Tube, Primary Peritoneal, AJCC 8th Edition  - Clinical: FIGO Stage IC2, calculated as Stage IC (cT1c2, cN0, cM0) - Signed by Erica Lake MD on 3/7/2022       3/7/2022 -  Cancer Staged    Staging form: Ovary, Fallopian Tube, Primary Peritoneal, AJCC 8th Edition  - Pathologic stage from 3/7/2022: FIGO Stage IC2, calculated as Stage Unknown (pT1c2, pNX, cM0) - Signed by Erica Lake MD on 3/7/2022  Histopathologic type: Granulosa cell tumor, adult type  Stage prefix: Initial diagnosis  Histologic grade (G): G1  Histologic grading system: 4 grade system  Residual tumor (R): R0 - None             Patient ID: Ghada Cormier is a 58 y.o. female  Patient is a very pleasant 40-year-old female with a history of stage I C2 incidentally identified granulosa cell tumor of the ovary. She was treated with surgery alone. This was in 2022. The patient presents in follow-up today. She is without complaint. Her most recent inhibin levels are similar with an inhibin B of less than 7 and inhibin a of 1.1. The inhibin a level is slightly above last visits but below the ones before then. Today, the patient is doing well. She denies significant abdominal pain, pelvic pain, nausea, vomiting, constipation, diarrhea, fevers, chills, or vaginal bleeding. The following portions of the patient's history were reviewed and updated as appropriate: allergies, current medications, past medical history, past social history, past surgical history and problem list.    Review of Systems   Constitutional: Negative. HENT: Negative. Eyes: Negative. Respiratory: Negative. Cardiovascular: Negative. Gastrointestinal: Negative. Endocrine: Negative. Genitourinary: Negative. Musculoskeletal: Negative. Skin: Negative. Neurological: Negative. Hematological: Negative. Psychiatric/Behavioral: Negative.         Current Outpatient Medications   Medication Sig Dispense Refill   • aspirin (ECOTRIN LOW STRENGTH) 81 mg EC tablet Take 81 mg by mouth daily     • carvedilol (COREG) 25 mg tablet Take 1 tablet (25 mg total) by mouth 2 (two) times a day with meals 180 tablet 3   • cetirizine (ZyrTEC) 10 mg tablet Take 1 tablet (10 mg total) by mouth daily 30 tablet 5   • Cholecalciferol (VITAMIN D3 PO) Take 2,000 Units by mouth 2 (two) times a day     • cyanocobalamin 2000 MCG tablet Take 2,000 mcg by mouth daily     • fluticasone (FLONASE) 50 mcg/act nasal spray 1 spray into each nostril daily 15.8 mL 5   • glipiZIDE (GLUCOTROL) 5 mg tablet Take 1 tablet (5 mg total) by mouth 2 (two) times a day before meals (Patient taking differently: Take 5 mg by mouth 2 (two) times a day before meals One in the morning and one at night) 60 tablet 3   • Glucose Blood (ONETOUCH TEST VI) by In Vitro route     • Januvia 100 MG tablet take 1 tablet by mouth once daily 30 tablet 3   • Lancets (OneTouch Delica Plus [id number]) MISC use 1 LANCET to TEST BLOOD SUGAR three times a day 100 each 3   • losartan (COZAAR) 100 MG tablet take 1 tablet by mouth once daily 90 tablet 3   • metFORMIN (GLUCOPHAGE) 500 mg tablet take 2 tablets by mouth every morning and 2 tablets by mouth every evening 120 tablet 5   • Multiple Vitamin-Folic Acid TABS Take by mouth     • nitroglycerin (NITROSTAT) 0.4 mg SL tablet Place 1 tablet (0.4 mg total) under the tongue every 5 (five) minutes as needed for chest pain 30 tablet 3   • nystatin (MYCOSTATIN) cream Apply topically 2 (two) times a day 30 g 0   • nystatin (MYCOSTATIN) powder Apply topically 2 (two) times a day 60 g 3   • ondansetron (ZOFRAN) 4 mg tablet Take 1 tablet (4 mg total) by mouth every 8 (eight) hours as needed for nausea or vomiting 20 tablet 2   • OneTouch Verio test strip use 1 TEST STRIP to TEST BLOOD SUGAR three times a day 100 strip 0   • rosuvastatin (CRESTOR) 40 MG tablet take 1 tablet by mouth once daily 90 tablet 3     No current facility-administered medications for this visit.            Objective:    Blood pressure 148/74, pulse 92, temperature 97.5 °F (36.4 °C), temperature source Temporal, resp. rate 18, height 5' 3" (1.6 m), weight 93.9 kg (207 lb), SpO2 96 %, unknown if currently breastfeeding. Body mass index is 36.67 kg/m². Body surface area is 1.96 meters squared. Physical Exam  Constitutional:       Appearance: She is well-developed. HENT:      Head: Normocephalic and atraumatic. Neck:      Thyroid: No thyromegaly. Cardiovascular:      Rate and Rhythm: Normal rate and regular rhythm. Heart sounds: Normal heart sounds. Pulmonary:      Effort: Pulmonary effort is normal.      Breath sounds: Normal breath sounds. Abdominal:      General: Bowel sounds are normal.      Palpations: Abdomen is soft. Comments: Well healed laparoscopic incisions. Genitourinary:     Comments: -Normal external female genitalia, normal Bartholin's and Collins's glands                  -Normal midline urethral meatus. No lesions notes                  -Bladder without fullness mass or tenderness                  -Vagina without lesion or discharge No significant cystocele or rectocele noted                  -Cervix surgically absent                  -Uterus surgically absent                  -Adnexae surgically absent                  - Anus without fissure of lesion    Musculoskeletal:         General: Normal range of motion. Cervical back: Normal range of motion and neck supple. Lymphadenopathy:      Cervical: No cervical adenopathy. Skin:     General: Skin is warm and dry. Neurological:      Mental Status: She is alert and oriented to person, place, and time.    Psychiatric:         Behavior: Behavior normal.         Lab Results   Component Value Date     6.1 01/19/2022     Lab Results   Component Value Date    K 3.9 06/29/2023     06/29/2023    CO2 22 06/29/2023    BUN 8 06/29/2023    CREATININE 0.96 06/29/2023    GLUF 176 (H) 06/29/2023    CALCIUM 9.1 06/29/2023    AST 27 06/29/2023    ALT 31 06/29/2023    ALKPHOS 94 06/29/2023    EGFR 63 06/29/2023     Lab Results Component Value Date    WBC 12.30 (H) 06/29/2023    HGB 13.2 06/29/2023    HCT 41.4 06/29/2023    MCV 87 06/29/2023     06/29/2023     Lab Results   Component Value Date    NEUTROABS 8.11 (H) 06/29/2023        Trend:  Lab Results   Component Value Date     6.1 01/19/2022

## 2023-10-09 NOTE — ASSESSMENT & PLAN NOTE
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 She has no evidence of recurrence of disease at this point. Her  has slightly increased however there is no trend. We recommended follow-up in 3 months with repeat inhibin levels at that time.

## 2023-10-09 NOTE — PROGRESS NOTES
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 She has no evidence of recurrence of disease at this point. Her  has slightly increased however there is no trend. We recommended follow-up in 3 months with repeat inhibin levels at that time. Relevant Orders    Inhibin A    Inhibin B         CHIEF COMPLAINT: Follow-up ovarian cancer      Problem:  Cancer Staging   Ovarian cancer ([address])  Staging form: Ovary, Fallopian Tube, Primary Peritoneal, AJCC 8th Edition  - Clinical: FIGO Stage IC2, calculated as Stage IC (cT1c2, cN0, cM0) - Signed by Bruna Hicks MD on 3/7/2022  - Pathologic stage from 3/7/2022: FIGO Stage IC2, calculated as Stage Unknown (pT1c2, pNX, cM0) - Signed by Bruna Hicks MD on 3/7/2022        Previous therapy:  Oncology History   Ovarian cancer ([address])   12/2021 Surgery    TAHBSO for fibroid uterus of 24 weeks with an incidental finding of a 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 Surgery performed by Martir Jones     3/7/2022 Initial Diagnosis    Ovarian cancer ([address])     3/7/2022 -  Cancer Staged    Staging form: Ovary, Fallopian Tube, Primary Peritoneal, AJCC 8th Edition  - Clinical: FIGO Stage IC2, calculated as Stage IC (cT1c2, cN0, cM0) - Signed by Bruna Hicks MD on 3/7/2022       3/7/2022 -  Cancer Staged    Staging form: Ovary, Fallopian Tube, Primary Peritoneal, AJCC 8th Edition  - Pathologic stage from 3/7/2022:  FIGO Stage IC2, calculated as Stage Unknown (pT1c2, pNX, cM0) - Signed by Bruna Hicks MD on 3/7/2022  Histopathologic type: Granulosa cell tumor, adult type  Stage prefix: Initial diagnosis  Histologic grade (G): G1  Histologic grading system: 4 grade system  Residual tumor (R): R0 - None             Patient ID: Jenny Pete is a 58 y.o. female  Patient is a very pleasant 51-year-old female with a history of stage I C2 incidentally identified granulosa cell tumor of the ovary. She was treated with surgery alone. This was in 2022. The patient presents in follow-up today. She is without complaint. Her most recent inhibin levels are similar with an inhibin B of less than 7 and inhibin a of 1.1. The inhibin a level is slightly above last visits but below the ones before then. Today, the patient is doing well. She denies significant abdominal pain, pelvic pain, nausea, vomiting, constipation, diarrhea, fevers, chills, or vaginal bleeding. The following portions of the patient's history were reviewed and updated as appropriate: allergies, current medications, past medical history, past social history, past surgical history and problem list.    Review of Systems   Constitutional: Negative. HENT: Negative. Eyes: Negative. Respiratory: Negative. Cardiovascular: Negative. Gastrointestinal: Negative. Endocrine: Negative. Genitourinary: Negative. Musculoskeletal: Negative. Skin: Negative. Neurological: Negative. Hematological: Negative. Psychiatric/Behavioral: Negative.         Current Outpatient Medications   Medication Sig Dispense Refill   • aspirin (ECOTRIN LOW STRENGTH) 81 mg EC tablet Take 81 mg by mouth daily     • carvedilol (COREG) 25 mg tablet Take 1 tablet (25 mg total) by mouth 2 (two) times a day with meals 180 tablet 3   • cetirizine (ZyrTEC) 10 mg tablet Take 1 tablet (10 mg total) by mouth daily 30 tablet 5   • Cholecalciferol (VITAMIN D3 PO) Take 2,000 Units by mouth 2 (two) times a day     • cyanocobalamin 2000 MCG tablet Take 2,000 mcg by mouth daily     • fluticasone (FLONASE) 50 mcg/act nasal spray 1 spray into each nostril daily 15.8 mL 5   • glipiZIDE (GLUCOTROL) 5 mg tablet Take 1 tablet (5 mg total) by mouth 2 (two) times a day before meals (Patient taking differently: Take 5 mg by mouth 2 (two) times a day before meals One in the morning and one at night) 60 tablet 3   • Glucose Blood (ONETOUCH TEST VI) by In Vitro route     • Januvia 100 MG tablet take 1 tablet by mouth once daily 30 tablet 3   • Lancets (OneTouch Delica Plus [id number]) MISC use 1 LANCET to TEST BLOOD SUGAR three times a day 100 each 3   • losartan (COZAAR) 100 MG tablet take 1 tablet by mouth once daily 90 tablet 3   • metFORMIN (GLUCOPHAGE) 500 mg tablet take 2 tablets by mouth every morning and 2 tablets by mouth every evening 120 tablet 5   • Multiple Vitamin-Folic Acid TABS Take by mouth     • nitroglycerin (NITROSTAT) 0.4 mg SL tablet Place 1 tablet (0.4 mg total) under the tongue every 5 (five) minutes as needed for chest pain 30 tablet 3   • nystatin (MYCOSTATIN) cream Apply topically 2 (two) times a day 30 g 0   • nystatin (MYCOSTATIN) powder Apply topically 2 (two) times a day 60 g 3   • ondansetron (ZOFRAN) 4 mg tablet Take 1 tablet (4 mg total) by mouth every 8 (eight) hours as needed for nausea or vomiting 20 tablet 2   • OneTouch Verio test strip use 1 TEST STRIP to TEST BLOOD SUGAR three times a day 100 strip 0   • rosuvastatin (CRESTOR) 40 MG tablet take 1 tablet by mouth once daily 90 tablet 3     No current facility-administered medications for this visit. Objective:    Blood pressure 148/74, pulse 92, temperature 97.5 °F (36.4 °C), temperature source Temporal, resp. rate 18, height 5' 3" (1.6 m), weight 93.9 kg (207 lb), SpO2 96 %, unknown if currently breastfeeding. Body mass index is 36.67 kg/m². Body surface area is 1.96 meters squared. Physical Exam  Constitutional:       Appearance: She is well-developed. HENT:      Head: Normocephalic and atraumatic. Neck:      Thyroid: No thyromegaly. Cardiovascular:      Rate and Rhythm: Normal rate and regular rhythm. Heart sounds: Normal heart sounds. Pulmonary:      Effort: Pulmonary effort is normal.      Breath sounds: Normal breath sounds.    Abdominal: General: Bowel sounds are normal.      Palpations: Abdomen is soft. Comments: Well healed laparoscopic incisions. Genitourinary:     Comments: -Normal external female genitalia, normal Bartholin's and Loring's glands                  -Normal midline urethral meatus. No lesions notes                  -Bladder without fullness mass or tenderness                  -Vagina without lesion or discharge No significant cystocele or rectocele noted                  -Cervix surgically absent                  -Uterus surgically absent                  -Adnexae surgically absent                  - Anus without fissure of lesion    Musculoskeletal:         General: Normal range of motion. Cervical back: Normal range of motion and neck supple. Lymphadenopathy:      Cervical: No cervical adenopathy. Skin:     General: Skin is warm and dry. Neurological:      Mental Status: She is alert and oriented to person, place, and time.    Psychiatric:         Behavior: Behavior normal.         Lab Results   Component Value Date     6.1 01/19/2022     Lab Results   Component Value Date    K 3.9 06/29/2023     06/29/2023    CO2 22 06/29/2023    BUN 8 06/29/2023    CREATININE 0.96 06/29/2023    GLUF 176 (H) 06/29/2023    CALCIUM 9.1 06/29/2023    AST 27 06/29/2023    ALT 31 06/29/2023    ALKPHOS 94 06/29/2023    EGFR 63 06/29/2023     Lab Results   Component Value Date    WBC 12.30 (H) 06/29/2023    HGB 13.2 06/29/2023    HCT 41.4 06/29/2023    MCV 87 06/29/2023     06/29/2023     Lab Results   Component Value Date    NEUTROABS 8.11 (H) 06/29/2023        Trend:  Lab Results   Component Value Date     6.1 01/19/2022

## 2023-10-14 DIAGNOSIS — E11.00 TYPE 2 DIABETES MELLITUS WITH HYPEROSMOLARITY WITHOUT COMA, WITHOUT LONG-TERM CURRENT USE OF INSULIN (HCC): ICD-10-CM

## 2023-11-10 DIAGNOSIS — E11.00 TYPE 2 DIABETES MELLITUS WITH HYPEROSMOLARITY WITHOUT COMA, WITHOUT LONG-TERM CURRENT USE OF INSULIN (HCC): ICD-10-CM

## 2023-11-11 DIAGNOSIS — E11.9 TYPE 2 DIABETES MELLITUS WITHOUT COMPLICATION, WITHOUT LONG-TERM CURRENT USE OF INSULIN (HCC): ICD-10-CM

## 2023-11-11 DIAGNOSIS — I25.10 ATHEROSCLEROSIS OF NATIVE CORONARY ARTERY OF NATIVE HEART WITHOUT ANGINA PECTORIS: ICD-10-CM

## 2023-11-13 RX ORDER — NITROGLYCERIN 0.4 MG/1
0.4 TABLET SUBLINGUAL
Qty: 30 TABLET | Refills: 0 | Status: SHIPPED | OUTPATIENT
Start: 2023-11-13

## 2023-11-13 RX ORDER — BLOOD-GLUCOSE METER
EACH MISCELLANEOUS
Qty: 100 STRIP | Refills: 0 | Status: SHIPPED | OUTPATIENT
Start: 2023-11-13

## 2023-11-28 ENCOUNTER — VBI (OUTPATIENT)
Dept: ADMINISTRATIVE | Facility: OTHER | Age: 63
End: 2023-11-28

## 2023-12-08 DIAGNOSIS — E11.00 TYPE 2 DIABETES MELLITUS WITH HYPEROSMOLARITY WITHOUT COMA, WITHOUT LONG-TERM CURRENT USE OF INSULIN (HCC): ICD-10-CM

## 2023-12-09 DIAGNOSIS — I10 BENIGN ESSENTIAL HYPERTENSION: ICD-10-CM

## 2023-12-11 RX ORDER — CARVEDILOL 25 MG/1
25 TABLET ORAL 2 TIMES DAILY WITH MEALS
Qty: 180 TABLET | Refills: 3 | Status: SHIPPED | OUTPATIENT
Start: 2023-12-11

## 2024-01-06 DIAGNOSIS — E11.9 TYPE 2 DIABETES MELLITUS WITHOUT COMPLICATION, WITHOUT LONG-TERM CURRENT USE OF INSULIN (HCC): ICD-10-CM

## 2024-01-06 DIAGNOSIS — E11.00 TYPE 2 DIABETES MELLITUS WITH HYPEROSMOLARITY WITHOUT COMA, WITHOUT LONG-TERM CURRENT USE OF INSULIN (HCC): ICD-10-CM

## 2024-01-08 RX ORDER — SITAGLIPTIN 100 MG/1
100 TABLET, FILM COATED ORAL DAILY
Qty: 30 TABLET | Refills: 3 | Status: SHIPPED | OUTPATIENT
Start: 2024-01-08

## 2024-01-12 ENCOUNTER — OFFICE VISIT (OUTPATIENT)
Dept: CARDIOLOGY CLINIC | Facility: HOSPITAL | Age: 64
End: 2024-01-12
Payer: COMMERCIAL

## 2024-01-12 ENCOUNTER — APPOINTMENT (OUTPATIENT)
Dept: LAB | Facility: HOSPITAL | Age: 64
End: 2024-01-12
Payer: COMMERCIAL

## 2024-01-12 VITALS
HEIGHT: 63 IN | HEART RATE: 82 BPM | DIASTOLIC BLOOD PRESSURE: 78 MMHG | WEIGHT: 203 LBS | BODY MASS INDEX: 35.97 KG/M2 | SYSTOLIC BLOOD PRESSURE: 148 MMHG

## 2024-01-12 DIAGNOSIS — I25.10 CORONARY ARTERY DISEASE INVOLVING NATIVE CORONARY ARTERY OF NATIVE HEART WITHOUT ANGINA PECTORIS: Primary | ICD-10-CM

## 2024-01-12 DIAGNOSIS — I10 BENIGN ESSENTIAL HYPERTENSION: ICD-10-CM

## 2024-01-12 DIAGNOSIS — Z72.0 TOBACCO ABUSE: ICD-10-CM

## 2024-01-12 DIAGNOSIS — Z95.5 PRESENCE OF DRUG COATED STENT IN RIGHT CORONARY ARTERY: ICD-10-CM

## 2024-01-12 DIAGNOSIS — I44.7 LEFT BUNDLE BRANCH BLOCK (LBBB): ICD-10-CM

## 2024-01-12 DIAGNOSIS — E78.5 DYSLIPIDEMIA: ICD-10-CM

## 2024-01-12 DIAGNOSIS — C56.9 MALIGNANT NEOPLASM OF OVARY, UNSPECIFIED LATERALITY (HCC): ICD-10-CM

## 2024-01-12 PROCEDURE — 86336 INHIBIN A: CPT

## 2024-01-12 PROCEDURE — 93000 ELECTROCARDIOGRAM COMPLETE: CPT | Performed by: PHYSICIAN ASSISTANT

## 2024-01-12 PROCEDURE — 83520 IMMUNOASSAY QUANT NOS NONAB: CPT

## 2024-01-12 PROCEDURE — 99214 OFFICE O/P EST MOD 30 MIN: CPT | Performed by: PHYSICIAN ASSISTANT

## 2024-01-12 NOTE — PATIENT INSTRUCTIONS
Start checking your blood pressure once daily. The best time to check your blood pressure is at least one hour after you take your blood pressure medication. Keep a log of your blood pressures. Call the office in 2 weeks ([phone]) with updated blood pressure readings. Try to limit your sodium intake.

## 2024-01-12 NOTE — PROGRESS NOTES
Cardiology Follow Up    Laurie Nielsen  1960  [id number]  CARDIOVASC PHYSICIAN  [address] [facility] [address]  [phone]  [phone]    1. Coronary artery disease involving native coronary artery of native heart without angina pectoris        2. Presence of drug coated stent in right coronary artery        3. Benign essential hypertension        4. Dyslipidemia        5. Left bundle branch block (LBBB)        6. Tobacco abuse            Discussion/Summary:  Coronary artery disease:  S/p AMERICA to the mid RCA in 2013. She has no anginal symptoms. Continue aspirin, statin, beta-blocker.    Hypertension:  Mildly elevated in the office today. She will begin checking her blood pressure once daily at home and notify the office in 2 weeks with updated readings. Low sodium diet was reinforced.     Dyslipidemia:  LDL is at goal (20) given CAD. Continue statin.  Last A1C 8.1    LBBB:  Chronic, noted. Echo from March 2022 showed preserved LVEF.    Tobacco use:  Continues to smoke 1/2 pack of cigarettes daily. Complete smoking cessation encouraged.    She will RTO in 1 year with Dr. Bui or sooner if necessary. She will call the office with any concerns in the interim.     Interval History:   Laurie Nielsen is a 63 y.o. female with coronary artery disease s/p AMERICA to the mid RCA in 2013, hypertension, dyslipidemia, diabetes, tobacco abuse who presents to the office today for routine follow up.    At her last office visit her carvedilol was increased to 25 mg BID.     Overall she has been feeling well. She denies any complaints. She does not exercise but is able to climb up and down stairs at home without any shortness of breath or chest pain/pressure/discomfort. She denies lower extremity edema. She does chronically sleep upright as she experiences shortness of breath when lying flat. She denies lightheadedness, dizziness, or syncope. She denies palpitations.    She continues to  smoke 1/2 pack of cigarettes daily.     Medical Problems       Problem List       Anxiety    Atherosclerotic heart disease of native coronary artery without angina pectoris    Overview Signed 3/19/2018 12:47 PM by ANA Mcdaniel     Description: s/p AMERICA ([employer]) mid RCA 05/28/2013         Dyslipidemia    Benign essential hypertension    Obesity    Type 2 diabetes mellitus without complication, without long-term current use of insulin (HCC)      Lab Results   Component Value Date    HGBA1C 8.1 (A) 10/03/2023         Leukocytosis    Presence of drug coated stent in right coronary artery    Left bundle branch block (LBBB)    Tobacco abuse    Acute bilateral low back pain with bilateral sciatica    Body mass index (BMI) 36.0-36.9, adult    LLQ pain    Fibroid uterus    Adnexal mass    Postoperative state    Ovarian cancer (HCC)    BMI 37.0-37.9, adult    Acute pain of right shoulder    Type 2 diabetes mellitus with hyperosmolarity without coma, without long-term current use of insulin (HCC)      Lab Results   Component Value Date    HGBA1C 8.1 (A) 10/03/2023             Past Medical History:   Diagnosis Date    Cardiac disease     MI May 25, 2013    Chronic neck pain     Last Assessed:11/28/16    Coronary artery disease     Diabetes mellitus (HCC)     Hyperlipidemia     Hypertension     Left bundle branch block     Last Assessed:12/5/13    Mild depression     Last Assessed:11/28/16    Ovarian cancer (HCC) 2/9/2022    Tonsillar hypertrophy     Last Assessed:8/27/13     Social History     Socioeconomic History    Marital status:      Spouse name: Not on file    Number of children: 2    Years of education: Not on file    Highest education level: Not on file   Occupational History    Occupation: admin      Comment: [employer]   Tobacco Use    Smoking status: Light Smoker     Current packs/day: 0.25     Average packs/day: 0.3 packs/day for 20.0 years (5.0 ttl pk-yrs)     Types: Cigarettes     Smokeless tobacco: Never    Tobacco comments:     cutting back  education given. last smoked 2/8 2300   Vaping Use    Vaping status: Never Used   Substance and Sexual Activity    Alcohol use: No    Drug use: No     Comment: occassionally     Sexual activity: Not Currently   Other Topics Concern    Not on file   Social History Narrative    Not on file     Social Determinants of Health     Financial Resource Strain: Not on file   Food Insecurity: Not on file   Transportation Needs: Not on file   Physical Activity: Not on file   Stress: Not on file   Social Connections: Not on file   Intimate Partner Violence: Not on file   Housing Stability: Not on file      Family History   Problem Relation Age of Onset    Arthritis Mother     Colon cancer Mother 78    Coronary artery disease Mother     Fibrocystic breast disease Mother     Hypertension Mother     Cardiomyopathy Mother         Ischemic Dilated Cardiomyopathy    Arthritis Father     Kidney disease Father         Chronic (NKF classification)    Coronary artery disease Father     Gout Father     Prostate cancer Father     Hyperlipidemia Father         Pure Hypercholesterolemia    Stroke Father         Stroke Syndrome    Diabetes type II Father     Hypertension Sister     Hypothyroidism Sister     Other Sister         IVP- Solitary Kidney Calculus    Rheum arthritis Sister     Alcohol abuse Brother     Bipolar disorder Brother         NOS    Other Brother         IVP- Solitary Kidney Calculus    [place]'s disease Brother     Cerebral aneurysm Brother         Ruptured    Breast cancer Maternal Aunt     Peripheral vascular disease Maternal Aunt     Breast cancer Cousin         2 maternal cousins    Lung cancer Maternal Uncle     Diabetes type II Maternal Uncle     Thrombocytopenia Daughter         Idiopathic Thrombocytopenia Purpura at 5 yo    Fibroids Paternal Grandmother      Past Surgical History:   Procedure Laterality Date    CARDIAC SURGERY      1 stent placed     CORONARY ANGIOPLASTY WITH STENT PLACEMENT  05/28/2013    per [employer]: Cath stent 1 type drug-eluting, mild RCA    HYSTERECTOMY  2/9/2022    AL TOTAL ABDOMINAL HYSTERECT W/WO RMVL TUBE OVARY N/A 02/09/2022    Procedure: HYSTERECTOMY TOTAL ABDOMINAL (ROMÁN); B/L SALPINGO-OOPHORECTOMY, CYSTOSCOPY;  Surgeon: Brandy Matthews MD;  Location: [facility];  Service: Obstetrics       Current Outpatient Medications:     aspirin (ECOTRIN LOW STRENGTH) 81 mg EC tablet, Take 81 mg by mouth daily, Disp: , Rfl:     carvedilol (COREG) 25 mg tablet, take 1 tablet by mouth twice a day with meals, Disp: 180 tablet, Rfl: 3    cetirizine (ZyrTEC) 10 mg tablet, Take 1 tablet (10 mg total) by mouth daily, Disp: 30 tablet, Rfl: 5    Cholecalciferol (VITAMIN D3 PO), Take 2,000 Units by mouth 2 (two) times a day, Disp: , Rfl:     cyanocobalamin 2000 MCG tablet, Take 2,000 mcg by mouth daily, Disp: , Rfl:     fluticasone (FLONASE) 50 mcg/act nasal spray, 1 spray into each nostril daily, Disp: 15.8 mL, Rfl: 5    glipiZIDE (GLUCOTROL) 5 mg tablet, Take 1 tablet (5 mg total) by mouth 2 (two) times a day before meals (Patient taking differently: Take 5 mg by mouth 2 (two) times a day before meals One in the morning and one at night), Disp: 60 tablet, Rfl: 3    glucose blood (OneTouch Verio) test strip, Use as instructed, Disp: 100 strip, Rfl: 0    Januvia 100 MG tablet, take 1 tablet by mouth once daily, Disp: 30 tablet, Rfl: 3    Lancets (OneTouch Delica Plus [id number]) MISC, use 1 LANCET to TEST BLOOD SUGAR three times a day, Disp: 100 each, Rfl: 3    losartan (COZAAR) 100 MG tablet, take 1 tablet by mouth once daily, Disp: 90 tablet, Rfl: 3    metFORMIN (GLUCOPHAGE) 500 mg tablet, take 2 tablets by mouth twice a day before meals, Disp: 120 tablet, Rfl: 0    Multiple Vitamin-Folic Acid TABS, Take by mouth, Disp: , Rfl:     nitroglycerin (NITROSTAT) 0.4 mg SL tablet, Place 1 tablet (0.4 mg total) under the tongue every 5 (five) minutes as  "needed for chest pain, Disp: 30 tablet, Rfl: 0    nystatin (MYCOSTATIN) cream, Apply topically 2 (two) times a day, Disp: 30 g, Rfl: 0    nystatin (MYCOSTATIN) powder, Apply topically 2 (two) times a day, Disp: 60 g, Rfl: 3    ondansetron (ZOFRAN) 4 mg tablet, Take 1 tablet (4 mg total) by mouth every 8 (eight) hours as needed for nausea or vomiting, Disp: 20 tablet, Rfl: 2    rosuvastatin (CRESTOR) 40 MG tablet, take 1 tablet by mouth once daily, Disp: 90 tablet, Rfl: 3    Glucose Blood (ONETOUCH TEST VI), by In Vitro route, Disp: , Rfl:   Allergies   Allergen Reactions    Hctz [Hydrochlorothiazide] Shortness Of Breath and Dizziness    Wellbutrin [Bupropion] Shortness Of Breath     Category: Adverse Reaction;     Ace Inhibitors Cough       Labs:     Chemistry        Component Value Date/Time    K 3.9 06/29/2023 1233     06/29/2023 1233    CO2 22 06/29/2023 1233    BUN 8 06/29/2023 1233    CREATININE 0.96 06/29/2023 1233        Component Value Date/Time    CALCIUM 9.1 06/29/2023 1233    ALKPHOS 94 06/29/2023 1233    AST 27 06/29/2023 1233    ALT 31 06/29/2023 1233            No results found for: \"CHOL\"  Lab Results   Component Value Date    HDL 35 (L) 06/29/2023    HDL 36 (L) 03/05/2023    HDL 43 (L) 01/27/2022     Lab Results   Component Value Date    LDLCALC 20 06/29/2023    LDLCALC 19 03/05/2023    LDLCALC 26 01/27/2022     Lab Results   Component Value Date    TRIG 141 06/29/2023    TRIG 116 03/05/2023    TRIG 143 01/27/2022     No results found for: \"CHOLHDL\"    Imaging: No results found.    ECG: normal sinus rhythm  LBBB    Review of Systems   All other systems reviewed and are negative.      Vitals:    01/12/24 1412   BP: 148/78   Pulse: 82     Vitals:    01/12/24 1412   Weight: 92.1 kg (203 lb)     Height: 5' 3\" (160 cm)   Body mass index is 35.96 kg/m².    Physical Exam:  Physical Exam  Vitals reviewed.   Constitutional:       General: She is not in acute distress.     Appearance: She is obese. She " is not diaphoretic.   HENT:      Head: Normocephalic and atraumatic.   Eyes:      Pupils: Pupils are equal, round, and reactive to light.   Neck:      Vascular: No carotid bruit.   Cardiovascular:      Rate and Rhythm: Normal rate and regular rhythm.      Pulses:           Radial pulses are 2+ on the right side and 2+ on the left side.      Heart sounds: S1 normal and S2 normal. No murmur heard.  Pulmonary:      Effort: Pulmonary effort is normal. No respiratory distress.      Breath sounds: Normal breath sounds. No wheezing or rales.   Abdominal:      General: There is no distension.      Palpations: Abdomen is soft.      Tenderness: There is no abdominal tenderness.   Musculoskeletal:         General: No deformity. Normal range of motion.      Cervical back: Normal range of motion.      Right lower leg: No edema.      Left lower leg: No edema.   Skin:     General: Skin is warm and dry.      Findings: No erythema.   Neurological:      General: No focal deficit present.      Mental Status: She is alert and oriented to person, place, and time.   Psychiatric:         Mood and Affect: Mood normal.         Behavior: Behavior normal.

## 2024-01-15 LAB — INHIBIN A SERPL-MCNC: 0.9 PG/ML

## 2024-01-17 LAB — INHIBIN B SERPL-MCNC: <7 PG/ML (ref 0–16.9)

## 2024-01-22 ENCOUNTER — OFFICE VISIT (OUTPATIENT)
Dept: GYNECOLOGIC ONCOLOGY | Facility: CLINIC | Age: 64
End: 2024-01-22
Payer: COMMERCIAL

## 2024-01-22 VITALS
RESPIRATION RATE: 18 BRPM | HEIGHT: 63 IN | HEART RATE: 90 BPM | OXYGEN SATURATION: 97 % | SYSTOLIC BLOOD PRESSURE: 144 MMHG | BODY MASS INDEX: 36.54 KG/M2 | TEMPERATURE: 97.7 F | WEIGHT: 206.2 LBS | DIASTOLIC BLOOD PRESSURE: 62 MMHG

## 2024-01-22 DIAGNOSIS — C56.9 MALIGNANT NEOPLASM OF OVARY, UNSPECIFIED LATERALITY (HCC): Primary | ICD-10-CM

## 2024-01-22 PROCEDURE — 99213 OFFICE O/P EST LOW 20 MIN: CPT | Performed by: OBSTETRICS & GYNECOLOGY

## 2024-01-22 NOTE — ASSESSMENT & PLAN NOTE
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  She was treated with surgery alone and remains in clinical remission.  Is approximately 2 years since surgery.  We have recommended follow-up in 6 months with Inhibin A and inhibin B levels.

## 2024-01-22 NOTE — PROGRESS NOTES
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  She was treated with surgery alone and remains in clinical remission.  Is approximately 2 years since surgery.  We have recommended follow-up in 6 months with Inhibin A and inhibin B levels.         Relevant Orders    Inhibin A    Inhibin B         CHIEF COMPLAINT: Months ovarian cancer      Problem:  Cancer Staging   Ovarian cancer (HCC)  Staging form: Ovary, Fallopian Tube, Primary Peritoneal, AJCC 8th Edition  - Clinical: FIGO Stage IC2, calculated as Stage IC (cT1c2, cN0, cM0) - Signed by Manuel Marc MD on 3/7/2022  - Pathologic stage from 3/7/2022: FIGO Stage IC2, calculated as Stage Unknown (pT1c2, pNX, cM0) - Signed by Manuel Marc MD on 3/7/2022        Previous therapy:  Oncology History   Ovarian cancer (HCC)   12/2021 Surgery    TAHBSO for fibroid uterus of 24 weeks with an incidental finding of a 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  Surgery performed by Elizabeth Agosto     3/7/2022 Initial Diagnosis    Ovarian cancer (HCC)     3/7/2022 -  Cancer Staged    Staging form: Ovary, Fallopian Tube, Primary Peritoneal, AJCC 8th Edition  - Clinical: FIGO Stage IC2, calculated as Stage IC (cT1c2, cN0, cM0) - Signed by Manuel Marc MD on 3/7/2022       3/7/2022 -  Cancer Staged    Staging form: Ovary, Fallopian Tube, Primary Peritoneal, AJCC 8th Edition  - Pathologic stage from 3/7/2022: FIGO Stage IC2, calculated as Stage Unknown (pT1c2, pNX, cM0) - Signed by Manuel Marc MD on 3/7/2022  Histopathologic type: Granulosa cell tumor, adult type  Stage prefix: Initial diagnosis  Histologic grade (G): G1  Histologic grading system: 4 grade system  Residual tumor (R): R0 - None             Patient ID: Laurie Nielsen is a 63 y.o. 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  Since that time she has been without evidence of recurrence of disease.  Patient presents today in follow-up.  Her most recent inhibin a and inhibin B are in normal levels and have not significantly changed.    Today, the patient is doing well.  She denies significant abdominal pain, pelvic pain, nausea, vomiting, constipation, diarrhea, fevers, chills, or vaginal bleeding.           The following portions of the patient's history were reviewed and updated as appropriate: allergies, current medications, past family history, past medical history, past social history, past surgical history, and problem list.    Review of Systems   Constitutional: Negative.    HENT: Negative.     Eyes: Negative.    Respiratory: Negative.     Cardiovascular: Negative.    Gastrointestinal: Negative.    Endocrine: Negative.    Genitourinary: Negative.    Musculoskeletal: Negative.    Skin: Negative.    Neurological: Negative.    Hematological: Negative.    Psychiatric/Behavioral: Negative.         Current Outpatient Medications   Medication Sig Dispense Refill    aspirin (ECOTRIN LOW STRENGTH) 81 mg EC tablet Take 81 mg by mouth daily      carvedilol (COREG) 25 mg tablet take 1 tablet by mouth twice a day with meals 180 tablet 3    cetirizine (ZyrTEC) 10 mg tablet Take 1 tablet (10 mg total) by mouth daily 30 tablet 5    Cholecalciferol (VITAMIN D3 PO) Take 2,000 Units by mouth 2 (two) times a day      cyanocobalamin 2000 MCG tablet Take 2,000 mcg by mouth daily      fluticasone (FLONASE) 50 mcg/act nasal spray 1 spray into each nostril daily 15.8 mL 5    glipiZIDE (GLUCOTROL) 5 mg tablet Take 1 tablet (5 mg total) by mouth 2 (two) times a day before meals (Patient taking differently: Take 5 mg by mouth 2 (two) times a day before meals One in the morning and one at night) 60 tablet 3    glucose blood (OneTouch Verio) test strip Use as  "instructed 100 strip 0    Januvia 100 MG tablet take 1 tablet by mouth once daily 30 tablet 3    Lancets (OneTouch Delica Plus [id number]) MISC use 1 LANCET to TEST BLOOD SUGAR three times a day 100 each 3    losartan (COZAAR) 100 MG tablet take 1 tablet by mouth once daily 90 tablet 3    metFORMIN (GLUCOPHAGE) 500 mg tablet take 2 tablets by mouth twice a day before meals 120 tablet 0    Multiple Vitamin-Folic Acid TABS Take by mouth      nitroglycerin (NITROSTAT) 0.4 mg SL tablet Place 1 tablet (0.4 mg total) under the tongue every 5 (five) minutes as needed for chest pain 30 tablet 0    nystatin (MYCOSTATIN) cream Apply topically 2 (two) times a day 30 g 0    nystatin (MYCOSTATIN) powder Apply topically 2 (two) times a day 60 g 3    ondansetron (ZOFRAN) 4 mg tablet Take 1 tablet (4 mg total) by mouth every 8 (eight) hours as needed for nausea or vomiting 20 tablet 2    rosuvastatin (CRESTOR) 40 MG tablet take 1 tablet by mouth once daily 90 tablet 3    Glucose Blood (ONETOUCH TEST VI) by In Vitro route       No current facility-administered medications for this visit.           Objective:    Blood pressure 144/62, pulse 90, temperature 97.7 °F (36.5 °C), temperature source Temporal, resp. rate 18, height 5' 3\" (1.6 m), weight 93.5 kg (206 lb 3.2 oz), SpO2 97%, unknown if currently breastfeeding.  Body mass index is 36.53 kg/m².  Body surface area is 1.96 meters squared.    Physical Exam  Constitutional:       Appearance: She is well-developed.   HENT:      Head: Normocephalic and atraumatic.   Neck:      Thyroid: No thyromegaly.   Cardiovascular:      Rate and Rhythm: Normal rate and regular rhythm.      Heart sounds: Normal heart sounds.   Pulmonary:      Effort: Pulmonary effort is normal.      Breath sounds: Normal breath sounds.   Abdominal:      General: Bowel sounds are normal.      Palpations: Abdomen is soft.      Comments: Well healed laparoscopic incisions.   Genitourinary:     Comments: -Normal external " female genitalia, normal Bartholin's and [place]'s glands                  -Normal midline urethral meatus. No lesions notes                  -Bladder without fullness mass or tenderness                  -Vagina without lesion or discharge No significant cystocele or rectocele noted                  -Cervix surgically absent                  -Uterus surgically absent                  -Adnexae surgically absent                  - Anus without fissure of lesion    Musculoskeletal:         General: Normal range of motion.      Cervical back: Normal range of motion and neck supple.   Lymphadenopathy:      Cervical: No cervical adenopathy.   Skin:     General: Skin is warm and dry.   Neurological:      Mental Status: She is alert and oriented to person, place, and time.   Psychiatric:         Behavior: Behavior normal.         Lab Results   Component Value Date     6.1 01/19/2022     Lab Results   Component Value Date    K 3.9 06/29/2023     06/29/2023    CO2 22 06/29/2023    BUN 8 06/29/2023    CREATININE 0.96 06/29/2023    GLUF 176 (H) 06/29/2023    CALCIUM 9.1 06/29/2023    AST 27 06/29/2023    ALT 31 06/29/2023    ALKPHOS 94 06/29/2023    EGFR 63 06/29/2023     Lab Results   Component Value Date    WBC 12.30 (H) 06/29/2023    HGB 13.2 06/29/2023    HCT 41.4 06/29/2023    MCV 87 06/29/2023     06/29/2023     Lab Results   Component Value Date    NEUTROABS 8.11 (H) 06/29/2023        Trend:  Lab Results   Component Value Date     6.1 01/19/2022

## 2024-01-25 DIAGNOSIS — E11.00 TYPE 2 DIABETES MELLITUS WITH HYPEROSMOLARITY WITHOUT COMA, WITHOUT LONG-TERM CURRENT USE OF INSULIN (HCC): ICD-10-CM

## 2024-01-26 RX ORDER — GLIPIZIDE 5 MG/1
5 TABLET ORAL
Qty: 60 TABLET | Refills: 3 | Status: SHIPPED | OUTPATIENT
Start: 2024-01-26

## 2024-02-01 DIAGNOSIS — E11.00 TYPE 2 DIABETES MELLITUS WITH HYPEROSMOLARITY WITHOUT COMA, WITHOUT LONG-TERM CURRENT USE OF INSULIN (HCC): ICD-10-CM

## 2024-02-11 DIAGNOSIS — E11.00 TYPE 2 DIABETES MELLITUS WITH HYPEROSMOLARITY WITHOUT COMA, WITHOUT LONG-TERM CURRENT USE OF INSULIN (HCC): ICD-10-CM

## 2024-02-29 DIAGNOSIS — E11.9 TYPE 2 DIABETES MELLITUS WITHOUT COMPLICATION, WITHOUT LONG-TERM CURRENT USE OF INSULIN (HCC): ICD-10-CM

## 2024-03-01 RX ORDER — BLOOD SUGAR DIAGNOSTIC
STRIP MISCELLANEOUS
Qty: 100 STRIP | Refills: 3 | Status: SHIPPED | OUTPATIENT
Start: 2024-03-01

## 2024-03-01 RX ORDER — LANCETS 33 GAUGE
1 EACH MISCELLANEOUS 3 TIMES DAILY
Qty: 100 EACH | Refills: 3 | Status: SHIPPED | OUTPATIENT
Start: 2024-03-01

## 2024-03-05 DIAGNOSIS — E11.9 TYPE 2 DIABETES MELLITUS WITHOUT COMPLICATION, WITHOUT LONG-TERM CURRENT USE OF INSULIN (HCC): ICD-10-CM

## 2024-03-06 RX ORDER — BLOOD SUGAR DIAGNOSTIC
STRIP MISCELLANEOUS
Qty: 100 STRIP | Refills: 3 | Status: SHIPPED | OUTPATIENT
Start: 2024-03-06

## 2024-03-28 DIAGNOSIS — H04.129 DRY EYE: ICD-10-CM

## 2024-03-28 DIAGNOSIS — H57.89 EYE DRAINAGE: ICD-10-CM

## 2024-03-29 RX ORDER — CETIRIZINE HYDROCHLORIDE 10 MG/1
10 TABLET ORAL DAILY
Qty: 30 TABLET | Refills: 5 | Status: SHIPPED | OUTPATIENT
Start: 2024-03-29

## 2024-04-09 ENCOUNTER — OFFICE VISIT (OUTPATIENT)
Dept: INTERNAL MEDICINE CLINIC | Facility: CLINIC | Age: 64
End: 2024-04-09
Payer: COMMERCIAL

## 2024-04-09 VITALS
TEMPERATURE: 98.5 F | DIASTOLIC BLOOD PRESSURE: 74 MMHG | SYSTOLIC BLOOD PRESSURE: 132 MMHG | HEART RATE: 81 BPM | HEIGHT: 63 IN | WEIGHT: 205.4 LBS | OXYGEN SATURATION: 95 % | BODY MASS INDEX: 36.39 KG/M2

## 2024-04-09 DIAGNOSIS — I25.10 ATHEROSCLEROSIS OF NATIVE CORONARY ARTERY OF NATIVE HEART WITHOUT ANGINA PECTORIS: Primary | ICD-10-CM

## 2024-04-09 DIAGNOSIS — E78.5 DYSLIPIDEMIA: ICD-10-CM

## 2024-04-09 DIAGNOSIS — C56.9 MALIGNANT NEOPLASM OF OVARY, UNSPECIFIED LATERALITY (HCC): ICD-10-CM

## 2024-04-09 DIAGNOSIS — Z12.31 ENCOUNTER FOR SCREENING MAMMOGRAM FOR MALIGNANT NEOPLASM OF BREAST: ICD-10-CM

## 2024-04-09 DIAGNOSIS — Z12.11 SCREENING FOR COLON CANCER: ICD-10-CM

## 2024-04-09 DIAGNOSIS — I10 BENIGN ESSENTIAL HYPERTENSION: ICD-10-CM

## 2024-04-09 DIAGNOSIS — I44.7 LEFT BUNDLE BRANCH BLOCK (LBBB): ICD-10-CM

## 2024-04-09 DIAGNOSIS — E11.00 TYPE 2 DIABETES MELLITUS WITH HYPEROSMOLARITY WITHOUT COMA, WITHOUT LONG-TERM CURRENT USE OF INSULIN (HCC): ICD-10-CM

## 2024-04-09 PROBLEM — R10.32 LLQ PAIN: Status: RESOLVED | Noted: 2021-12-14 | Resolved: 2024-04-09

## 2024-04-09 LAB — SL AMB POCT HEMOGLOBIN AIC: 7.2 (ref ?–6.5)

## 2024-04-09 PROCEDURE — 99396 PREV VISIT EST AGE 40-64: CPT | Performed by: NURSE PRACTITIONER

## 2024-04-09 PROCEDURE — 83036 HEMOGLOBIN GLYCOSYLATED A1C: CPT | Performed by: NURSE PRACTITIONER

## 2024-04-09 NOTE — PROGRESS NOTES
Name: Laurie Nielsen      : 1960      MRN: [id number]  Encounter Provider: ANA Mcdaniel  Encounter Date: 2024   Encounter department: [facility]    Assessment & Plan Will repeat fasting labs. A1C is at 7.2. Will have eye exam done. Will give script for mammogram and cologuard. Bp stable. Will repeat fasting labs. Will follow up in 6 months or sooner if need be.     1. Atherosclerosis of native coronary artery of native heart without angina pectoris  -     Comprehensive metabolic panel; Future  -     CBC and differential; Future  -     TSH, 3rd generation with Free T4 reflex; Future    2. Benign essential hypertension  -     Comprehensive metabolic panel; Future  -     CBC and differential; Future  -     TSH, 3rd generation with Free T4 reflex; Future    3. Left bundle branch block (LBBB)  -     Comprehensive metabolic panel; Future  -     CBC and differential; Future  -     TSH, 3rd generation with Free T4 reflex; Future    4. Malignant neoplasm of ovary, unspecified laterality (HCC)  -     Comprehensive metabolic panel; Future  -     CBC and differential; Future  -     TSH, 3rd generation with Free T4 reflex; Future    5. Type 2 diabetes mellitus with hyperosmolarity without coma, without long-term current use of insulin (HCC)  -     POCT hemoglobin A1c  -     Comprehensive metabolic panel; Future  -     CBC and differential; Future  -     TSH, 3rd generation with Free T4 reflex; Future  -     Albumin / creatinine urine ratio; Future    6. Encounter for screening mammogram for malignant neoplasm of breast  -     Mammo screening bilateral w 3d & cad; Future  -     Comprehensive metabolic panel; Future  -     CBC and differential; Future  -     TSH, 3rd generation with Free T4 reflex; Future    7. Screening for colon cancer  -     Cologuard  -     Comprehensive metabolic panel; Future  -     CBC and differential; Future  -     TSH, 3rd generation with Free T4 reflex;  Future    8. Dyslipidemia  -     Comprehensive metabolic panel; Future  -     CBC and differential; Future  -     TSH, 3rd generation with Free T4 reflex; Future  -     Lipid panel; Future           Subjective      Laurie is for a wellness. She is doing well and is doing well with her sugars. She is seeing GYN in July. She denies any chest pain, SOB, or palpitations. She denies any depression. She is willing to go for a mammogram and is overdue for an eye exam. She otherwise is doing well. She offers no other issues.      Review of Systems   All other systems reviewed and are negative.      Current Outpatient Medications on File Prior to Visit   Medication Sig    aspirin (ECOTRIN LOW STRENGTH) 81 mg EC tablet Take 81 mg by mouth daily    carvedilol (COREG) 25 mg tablet take 1 tablet by mouth twice a day with meals    cetirizine (ZyrTEC) 10 mg tablet Take 1 tablet (10 mg total) by mouth daily    Cholecalciferol (VITAMIN D3 PO) Take 2,000 Units by mouth 2 (two) times a day    cyanocobalamin 2000 MCG tablet Take 2,000 mcg by mouth daily    fluticasone (FLONASE) 50 mcg/act nasal spray 1 spray into each nostril daily    glipiZIDE (GLUCOTROL) 5 mg tablet take 1 tablet by mouth twice a day before meals    glucose blood (OneTouch Verio) test strip USE 1 STRIP 3 TIMES DAILY    Januvia 100 MG tablet take 1 tablet by mouth once daily    Lancets (OneTouch Delica Plus [id number]) MISC 1 Device by Device route 3 (three) times a day    losartan (COZAAR) 100 MG tablet take 1 tablet by mouth once daily    metFORMIN (GLUCOPHAGE) 500 mg tablet take 2 tablets by mouth twice a day before meals    Multiple Vitamin-Folic Acid TABS Take by mouth    nitroglycerin (NITROSTAT) 0.4 mg SL tablet Place 1 tablet (0.4 mg total) under the tongue every 5 (five) minutes as needed for chest pain    nystatin (MYCOSTATIN) cream Apply topically 2 (two) times a day    nystatin (MYCOSTATIN) powder Apply topically 2 (two) times a day    ondansetron (ZOFRAN)  "4 mg tablet Take 1 tablet (4 mg total) by mouth every 8 (eight) hours as needed for nausea or vomiting    rosuvastatin (CRESTOR) 40 MG tablet take 1 tablet by mouth once daily    [DISCONTINUED] Glucose Blood (ONETOUCH TEST VI) by In Vitro route       Objective     /74   Pulse 81   Temp 98.5 °F (36.9 °C) (Temporal)   Ht 5' 3\" (1.6 m)   Wt 93.2 kg (205 lb 6.4 oz)   SpO2 95%   BMI 36.38 kg/m²     Physical Exam  Vitals reviewed.   Constitutional:       Appearance: Normal appearance. She is obese.   HENT:      Head: Normocephalic and atraumatic.      Right Ear: Tympanic membrane, ear canal and external ear normal.      Left Ear: Tympanic membrane, ear canal and external ear normal.      Nose: Nose normal.      Mouth/Throat:      Mouth: Mucous membranes are moist.      Pharynx: Oropharynx is clear.   Eyes:      Extraocular Movements: Extraocular movements intact.      Conjunctiva/sclera: Conjunctivae normal.      Pupils: Pupils are equal, round, and reactive to light.   Cardiovascular:      Rate and Rhythm: Normal rate and regular rhythm.      Pulses: Normal pulses.      Heart sounds: Normal heart sounds.   Pulmonary:      Effort: Pulmonary effort is normal.      Breath sounds: Normal breath sounds.   Abdominal:      General: Abdomen is flat. Bowel sounds are normal.      Palpations: Abdomen is soft.   Musculoskeletal:         General: Normal range of motion.      Cervical back: Normal range of motion and neck supple.   Skin:     General: Skin is warm and dry.      Capillary Refill: Capillary refill takes less than 2 seconds.   Neurological:      General: No focal deficit present.      Mental Status: She is alert and oriented to person, place, and time. Mental status is at baseline.   Psychiatric:         Mood and Affect: Mood normal.         Behavior: Behavior normal.         Thought Content: Thought content normal.         Judgment: Judgment normal.       ANA Mcdaniel    "

## 2024-04-25 DIAGNOSIS — E78.5 DYSLIPIDEMIA: ICD-10-CM

## 2024-04-25 DIAGNOSIS — I10 ESSENTIAL HYPERTENSION: ICD-10-CM

## 2024-04-26 RX ORDER — ROSUVASTATIN CALCIUM 40 MG/1
40 TABLET, COATED ORAL DAILY
Qty: 90 TABLET | Refills: 1 | Status: SHIPPED | OUTPATIENT
Start: 2024-04-26

## 2024-04-26 RX ORDER — LOSARTAN POTASSIUM 100 MG/1
100 TABLET ORAL DAILY
Qty: 90 TABLET | Refills: 1 | Status: SHIPPED | OUTPATIENT
Start: 2024-04-26

## 2024-05-07 DIAGNOSIS — E11.9 TYPE 2 DIABETES MELLITUS WITHOUT COMPLICATION, WITHOUT LONG-TERM CURRENT USE OF INSULIN (HCC): ICD-10-CM

## 2024-05-07 RX ORDER — SITAGLIPTIN 100 MG/1
100 TABLET, FILM COATED ORAL DAILY
Qty: 90 TABLET | Refills: 1 | Status: SHIPPED | OUTPATIENT
Start: 2024-05-07

## 2024-05-09 PROBLEM — Z12.11 SCREENING FOR COLON CANCER: Status: RESOLVED | Noted: 2024-04-09 | Resolved: 2024-05-09

## 2024-06-18 DIAGNOSIS — E78.5 DYSLIPIDEMIA: ICD-10-CM

## 2024-06-19 DIAGNOSIS — E11.00 TYPE 2 DIABETES MELLITUS WITH HYPEROSMOLARITY WITHOUT COMA, WITHOUT LONG-TERM CURRENT USE OF INSULIN (HCC): ICD-10-CM

## 2024-06-19 RX ORDER — GLIPIZIDE 5 MG/1
5 TABLET ORAL
Qty: 60 TABLET | Refills: 5 | Status: SHIPPED | OUTPATIENT
Start: 2024-06-19

## 2024-06-19 RX ORDER — ROSUVASTATIN CALCIUM 40 MG/1
40 TABLET, COATED ORAL DAILY
Qty: 90 TABLET | Refills: 1 | Status: SHIPPED | OUTPATIENT
Start: 2024-06-19

## 2024-07-17 ENCOUNTER — TELEPHONE (OUTPATIENT)
Dept: GYNECOLOGIC ONCOLOGY | Facility: CLINIC | Age: 64
End: 2024-07-17

## 2024-07-17 NOTE — TELEPHONE ENCOUNTER
I left a message reminding the patient to get the Inhibin A & B blood work done prior to the appointment with ANA De La Cruz on 7/23/2024.

## 2024-07-24 ENCOUNTER — APPOINTMENT (OUTPATIENT)
Dept: LAB | Facility: HOSPITAL | Age: 64
End: 2024-07-24
Payer: COMMERCIAL

## 2024-07-24 DIAGNOSIS — C56.9 MALIGNANT NEOPLASM OF OVARY, UNSPECIFIED LATERALITY (HCC): ICD-10-CM

## 2024-07-24 PROCEDURE — 86336 INHIBIN A: CPT

## 2024-07-24 PROCEDURE — 83520 IMMUNOASSAY QUANT NOS NONAB: CPT

## 2024-07-29 LAB
INHIBIN A SERPL-MCNC: 2.4 PG/ML
INHIBIN B SERPL-MCNC: <7 PG/ML (ref 0–16.9)

## 2024-07-30 ENCOUNTER — OFFICE VISIT (OUTPATIENT)
Dept: GYNECOLOGIC ONCOLOGY | Facility: CLINIC | Age: 64
End: 2024-07-30
Payer: COMMERCIAL

## 2024-07-30 ENCOUNTER — TELEPHONE (OUTPATIENT)
Dept: GYNECOLOGIC ONCOLOGY | Facility: CLINIC | Age: 64
End: 2024-07-30

## 2024-07-30 VITALS
RESPIRATION RATE: 18 BRPM | BODY MASS INDEX: 36.46 KG/M2 | OXYGEN SATURATION: 95 % | TEMPERATURE: 98 F | SYSTOLIC BLOOD PRESSURE: 144 MMHG | HEIGHT: 63 IN | DIASTOLIC BLOOD PRESSURE: 76 MMHG | HEART RATE: 94 BPM | WEIGHT: 205.8 LBS

## 2024-07-30 DIAGNOSIS — C56.9 MALIGNANT NEOPLASM OF OVARY, UNSPECIFIED LATERALITY (HCC): Primary | ICD-10-CM

## 2024-07-30 DIAGNOSIS — Z85.43 HISTORY OF OVARIAN CANCER: ICD-10-CM

## 2024-07-30 PROCEDURE — 99214 OFFICE O/P EST MOD 30 MIN: CPT | Performed by: NURSE PRACTITIONER

## 2024-07-30 NOTE — PROGRESS NOTES
Name: Laurie Nielsen      : 1960      MRN: [id number]  Encounter Provider: ANA Galeano  Encounter Date: 2024   Encounter department: [facility] [place]    Assessment & Plan   1. Malignant neoplasm of ovary, unspecified laterality (HCC)  Assessment & Plan:  Patient is a 63 year old female with a history of stage IC2 ovarian cancer treated by surgery alone about 2 years ago, in 2022. She has been seen for surveillance visits since. She does not have any issues or concerns at this time. Denies abdominal pain, vaginal bleeding/discharge, or bowel/bladder dysfunction. Inhibin A and B levels within normal limits at this time.     Plan to recheck Inhibin A and B levels prior to next surveillance visit in 6 months. While reviewing chart, last CT of the abdomen and pelvis in 3/2022 showed diffuse small pulmonary nodules, repeat was recommended but never obtained. Plan to repeat CT of abdomen and pelvis to assess for changes, will contact patient with results. Patient to contact office if there are any issues or concerns in the interim.   2. 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  Surgery performed by Elizabeth Agosto     3/7/2022 Initial Diagnosis    Ovarian cancer (HCC)     3/7/2022 -  Cancer Staged    Staging form: Ovary, Fallopian Tube, Primary Peritoneal, AJCC 8th Edition  - Clinical: FIGO Stage IC2, calculated as Stage IC (cT1c2, cN0, cM0) - Signed by Manuel Marc MD on 3/7/2022       3/7/2022 -  Cancer Staged    Staging form: Ovary, Fallopian Tube, Primary  Peritoneal, AJCC 8th Edition  - Pathologic stage from 3/7/2022: FIGO Stage IC2, calculated as Stage Unknown (pT1c2, pNX, cM0) - Signed by Manuel Marc MD on 3/7/2022  Histopathologic type: Granulosa cell tumor, adult type  Stage prefix: Initial diagnosis  Histologic grade (G): G1  Histologic grading system: 4 grade system  Residual tumor (R): R0 - None             History of Present Illness   Laurie Nielsen is a 63 y.o. female who presents for a 6 month surveillance visit due to a history of stage IC2 ovarian cancer treated with surgery alone. She does not have any issues or concerns at this time. Labs obtained prior to visit discussed, values are within normal limits. She denies nausea or vomiting. Her appetite is appropriate. She is voiding and moving her bowels without difficulty. She denies abdominal or pelvic pain. The patient is without vaginal bleeding or discharge. She is ambulatory.      Will repeat labs prior to next 6 month visit. Repeat CT of abdomen and pelvis to compare findings from last scan in 3/2022. Patient to call the office with any issues within the interim.     Review of Systems   Constitutional: Negative.  Negative for activity change, chills, fatigue, fever and unexpected weight change.   HENT: Negative.     Eyes: Negative.    Respiratory: Negative.  Negative for chest tightness and shortness of breath.    Cardiovascular: Negative.  Negative for chest pain, palpitations and leg swelling.   Gastrointestinal: Negative.  Negative for abdominal distention, abdominal pain, constipation and diarrhea.   Endocrine: Negative.    Genitourinary: Negative.  Negative for dysuria, pelvic pain, vaginal bleeding and vaginal discharge.   Musculoskeletal: Negative.    Skin: Negative.    Allergic/Immunologic: Negative.    Neurological: Negative.    Hematological: Negative.    Psychiatric/Behavioral: Negative.       Objective     /76 (BP Location: Left arm, Patient Position: Sitting, Cuff Size: Large)   " Pulse 94   Temp 98 °F (36.7 °C) (Temporal)   Resp 18   Ht 5' 3\" (1.6 m)   Wt 93.4 kg (205 lb 12.8 oz)   SpO2 95%   BMI 36.46 kg/m²     Physical Exam  Exam conducted with a chaperone present.   Constitutional:       General: She is not in acute distress.     Appearance: Normal appearance. She is normal weight. She is not ill-appearing.   HENT:      Head: Normocephalic and atraumatic.   Pulmonary:      Effort: Pulmonary effort is normal.   Abdominal:      General: Abdomen is flat. There is no distension.      Palpations: Abdomen is soft. There is no mass.      Tenderness: There is no abdominal tenderness.      Hernia: No hernia is present.   Genitourinary:     General: Normal vulva.      Rectum: Normal.      Comments: The external female genitalia is normal. The bartholin's, uretheral and skenes glands are normal. The urethral meatus is normal (midline with no lesions). Anus without fissure or lesion. Speculum exam reveals a grossly normal vagina. No masses, lesions,discharge or bleeding. No significant cystocele or rectocele noted. Bimanual exam notes a surgical absent cervix, uterus and adnexal structures. No masses or fullness. Bladder is without fullness, mass or tenderness.    Musculoskeletal:         General: Normal range of motion.      Cervical back: Normal range of motion and neck supple.   Skin:     General: Skin is warm and dry.      Findings: No erythema, lesion or rash.   Neurological:      General: No focal deficit present.      Mental Status: She is alert and oriented to person, place, and time. Mental status is at baseline.   Psychiatric:         Mood and Affect: Mood normal.         Behavior: Behavior normal.         Thought Content: Thought content normal.         Judgment: Judgment normal.               "

## 2024-07-30 NOTE — ASSESSMENT & PLAN NOTE
Patient is a 63 year old female with a history of stage IC2 ovarian cancer treated by surgery alone about 2 years ago, in March 2022. She has been seen for surveillance visits since. She does not have any issues or concerns at this time. Denies abdominal pain, vaginal bleeding/discharge, or bowel/bladder dysfunction. Inhibin A and B levels within normal limits at this time.     Plan to recheck Inhibin A and B levels prior to next surveillance visit in 6 months. While reviewing chart, last CT of the abdomen and pelvis in 3/2022 showed diffuse small pulmonary nodules, repeat was recommended but never obtained. Plan to repeat CT of abdomen and pelvis to assess for changes, will contact patient with results. Patient to contact office if there are any issues or concerns in the interim.

## 2024-08-19 DIAGNOSIS — J01.10 ACUTE NON-RECURRENT FRONTAL SINUSITIS: ICD-10-CM

## 2024-08-19 DIAGNOSIS — E11.9 TYPE 2 DIABETES MELLITUS WITHOUT COMPLICATION, WITHOUT LONG-TERM CURRENT USE OF INSULIN (HCC): ICD-10-CM

## 2024-08-19 DIAGNOSIS — I10 ESSENTIAL HYPERTENSION: ICD-10-CM

## 2024-08-20 RX ORDER — LOSARTAN POTASSIUM 100 MG/1
100 TABLET ORAL DAILY
Qty: 30 TABLET | Refills: 0 | Status: SHIPPED | OUTPATIENT
Start: 2024-08-20

## 2024-08-20 RX ORDER — BLOOD SUGAR DIAGNOSTIC
STRIP MISCELLANEOUS
Qty: 100 STRIP | Refills: 5 | Status: SHIPPED | OUTPATIENT
Start: 2024-08-20

## 2024-08-20 RX ORDER — LANCETS 33 GAUGE
1 EACH MISCELLANEOUS 3 TIMES DAILY
Qty: 100 EACH | Refills: 5 | Status: SHIPPED | OUTPATIENT
Start: 2024-08-20

## 2024-08-20 RX ORDER — FLUTICASONE PROPIONATE 50 MCG
1 SPRAY, SUSPENSION (ML) NASAL DAILY
Qty: 15.8 ML | Refills: 3 | Status: SHIPPED | OUTPATIENT
Start: 2024-08-20

## 2024-09-03 ENCOUNTER — TELEPHONE (OUTPATIENT)
Age: 64
End: 2024-09-03

## 2024-09-03 NOTE — TELEPHONE ENCOUNTER
PA for sitaGLIPtin (Januvia) 100 mg tablet SUBMITTED     via    []CMM-KEY:   [x][employer]-Case ID # [id number]   []Faxed to plan   []Other website   []Phone call Case ID #     Office notes sent, clinical questions answered. Awaiting determination    Turnaround time for your insurance to make a decision on your Prior Authorization can take 7-21 business days.

## 2024-09-06 DIAGNOSIS — E11.9 TYPE 2 DIABETES MELLITUS WITHOUT COMPLICATION, WITHOUT LONG-TERM CURRENT USE OF INSULIN (HCC): ICD-10-CM

## 2024-09-11 ENCOUNTER — TELEPHONE (OUTPATIENT)
Age: 64
End: 2024-09-11

## 2024-09-11 NOTE — TELEPHONE ENCOUNTER
PA for rosuvastatin 40 mg SUBMITTED     via    [x]CMM-KEY: [id number]  [][employer]Case ID #   []Faxed to plan   []Other website   []Phone call Case ID #     Office notes sent, clinical questions answered. Awaiting determination    Turnaround time for your insurance to make a decision on your Prior Authorization can take 7-21 business days.

## 2024-09-12 DIAGNOSIS — E11.00 TYPE 2 DIABETES MELLITUS WITH HYPEROSMOLARITY WITHOUT COMA, WITHOUT LONG-TERM CURRENT USE OF INSULIN (HCC): ICD-10-CM

## 2024-09-12 DIAGNOSIS — I10 ESSENTIAL HYPERTENSION: ICD-10-CM

## 2024-09-12 RX ORDER — LOSARTAN POTASSIUM 100 MG/1
100 TABLET ORAL DAILY
Qty: 90 TABLET | Refills: 3 | Status: SHIPPED | OUTPATIENT
Start: 2024-09-12

## 2024-09-12 NOTE — TELEPHONE ENCOUNTER
PA for Rosuvastatin 40 mg  DENIED    Reason:(Screenshot if applicable)        Message sent to office clinical pool Yes    Denial letter scanned into Media Yes    Appeal started No (Provider will need to decide if appeal is warranted and send clinical documentation to Prior Authorization Team for initiation.)    **Please follow up with your patient regarding denial and next steps**

## 2024-09-13 NOTE — TELEPHONE ENCOUNTER
PA for ROSUVASTATIN 40 MG  APPEALED via     []CMM  []SS  [x]Letter sent to insurance via fax [employer] [phone]  []Other site or means     All necessary records sent. Will await response from insurance company    Turnaround time for a decision to be made on an appeal could take up to 30 business days

## 2024-09-16 ENCOUNTER — TELEPHONE (OUTPATIENT)
Dept: CARDIOLOGY CLINIC | Facility: HOSPITAL | Age: 64
End: 2024-09-16

## 2024-09-16 NOTE — TELEPHONE ENCOUNTER
PA Appeal for ROSUVASTATIN 40 MG  APPROVED     Date(s) approved 9/13/24-9/13/25    Case # [id number]    Patient advised by          [x]MyChart Message  []Phone call   [x]LMOM  []L/M to call office as no active Communication consent on file  []Unable to leave detailed message as VM not approved on Communication consent       Pharmacy advised by    [x]Fax  []Phone call    Message sent to office clinical pool No      Approval letter scanned into Media Yes

## 2024-09-16 NOTE — TELEPHONE ENCOUNTER
Left message on voice mail for patient that P/A approval for rosuvastatin has been obtained by the P/A team. Also called [facility] to inform of same.

## 2024-09-28 DIAGNOSIS — I10 ESSENTIAL HYPERTENSION: ICD-10-CM

## 2024-09-28 DIAGNOSIS — E11.00 TYPE 2 DIABETES MELLITUS WITH HYPEROSMOLARITY WITHOUT COMA, WITHOUT LONG-TERM CURRENT USE OF INSULIN (HCC): ICD-10-CM

## 2024-09-30 RX ORDER — LOSARTAN POTASSIUM 100 MG/1
100 TABLET ORAL DAILY
Qty: 90 TABLET | Refills: 0 | Status: SHIPPED | OUTPATIENT
Start: 2024-09-30

## 2024-10-02 ENCOUNTER — TELEPHONE (OUTPATIENT)
Dept: INTERNAL MEDICINE CLINIC | Facility: CLINIC | Age: 64
End: 2024-10-02

## 2024-10-02 NOTE — TELEPHONE ENCOUNTER
Jayashree called stating she received Januvia from [facility].  Stated she does not use the mail order (not set up)  and does not know how it was sent there. Pt was upset over this issue.  I called her insurance [employer] and spoke to Laurence.  She stated she does not need to use the mail order and she can javier her chart.  She reviewed past refills and saw she uses retail.  Laurence stated Jayashree will need to call ([phone]) and inform them that she does not want the mail order for the Januvia.  Pt was made aware.

## 2024-11-02 DIAGNOSIS — E11.00 TYPE 2 DIABETES MELLITUS WITH HYPEROSMOLARITY WITHOUT COMA, WITHOUT LONG-TERM CURRENT USE OF INSULIN (HCC): ICD-10-CM

## 2024-11-11 ENCOUNTER — TELEPHONE (OUTPATIENT)
Dept: CARDIOLOGY CLINIC | Facility: HOSPITAL | Age: 64
End: 2024-11-11

## 2024-12-02 DIAGNOSIS — E11.00 TYPE 2 DIABETES MELLITUS WITH HYPEROSMOLARITY WITHOUT COMA, WITHOUT LONG-TERM CURRENT USE OF INSULIN (HCC): ICD-10-CM

## 2024-12-03 RX ORDER — GLIPIZIDE 5 MG/1
5 TABLET ORAL
Qty: 60 TABLET | Refills: 0 | Status: SHIPPED | OUTPATIENT
Start: 2024-12-03

## 2024-12-06 DIAGNOSIS — E11.00 TYPE 2 DIABETES MELLITUS WITH HYPEROSMOLARITY WITHOUT COMA, WITHOUT LONG-TERM CURRENT USE OF INSULIN (HCC): ICD-10-CM

## 2024-12-06 NOTE — TELEPHONE ENCOUNTER
Patient needs updated blood work and has previously placed orders. Please contact patient to go for labs. Courtesy refill provided.    Patient needs an appointment. Please contact the patient to schedule an appointment

## 2024-12-08 DIAGNOSIS — I10 BENIGN ESSENTIAL HYPERTENSION: ICD-10-CM

## 2024-12-08 DIAGNOSIS — E78.5 DYSLIPIDEMIA: ICD-10-CM

## 2024-12-10 RX ORDER — CARVEDILOL 25 MG/1
25 TABLET ORAL 2 TIMES DAILY WITH MEALS
Qty: 60 TABLET | Refills: 0 | Status: SHIPPED | OUTPATIENT
Start: 2024-12-10

## 2024-12-10 RX ORDER — ROSUVASTATIN CALCIUM 40 MG/1
40 TABLET, COATED ORAL DAILY
Qty: 30 TABLET | Refills: 0 | Status: SHIPPED | OUTPATIENT
Start: 2024-12-10

## 2024-12-31 DIAGNOSIS — E11.00 TYPE 2 DIABETES MELLITUS WITH HYPEROSMOLARITY WITHOUT COMA, WITHOUT LONG-TERM CURRENT USE OF INSULIN (HCC): ICD-10-CM

## 2025-01-02 RX ORDER — GLIPIZIDE 5 MG/1
5 TABLET ORAL
Qty: 60 TABLET | Refills: 0 | Status: SHIPPED | OUTPATIENT
Start: 2025-01-02

## 2025-01-08 DIAGNOSIS — E11.00 TYPE 2 DIABETES MELLITUS WITH HYPEROSMOLARITY WITHOUT COMA, WITHOUT LONG-TERM CURRENT USE OF INSULIN (HCC): ICD-10-CM

## 2025-01-08 DIAGNOSIS — E78.5 DYSLIPIDEMIA: ICD-10-CM

## 2025-01-08 DIAGNOSIS — I10 BENIGN ESSENTIAL HYPERTENSION: ICD-10-CM

## 2025-01-09 RX ORDER — CARVEDILOL 25 MG/1
25 TABLET ORAL 2 TIMES DAILY WITH MEALS
Qty: 60 TABLET | Refills: 3 | Status: SHIPPED | OUTPATIENT
Start: 2025-01-09

## 2025-01-09 RX ORDER — ROSUVASTATIN CALCIUM 40 MG/1
40 TABLET, COATED ORAL DAILY
Qty: 30 TABLET | Refills: 3 | Status: SHIPPED | OUTPATIENT
Start: 2025-01-09

## 2025-01-24 ENCOUNTER — OFFICE VISIT (OUTPATIENT)
Dept: INTERNAL MEDICINE CLINIC | Facility: CLINIC | Age: 65
End: 2025-01-24
Payer: COMMERCIAL

## 2025-01-24 VITALS
HEIGHT: 63 IN | WEIGHT: 206.3 LBS | TEMPERATURE: 96.7 F | HEART RATE: 84 BPM | OXYGEN SATURATION: 96 % | DIASTOLIC BLOOD PRESSURE: 82 MMHG | SYSTOLIC BLOOD PRESSURE: 140 MMHG | BODY MASS INDEX: 36.55 KG/M2

## 2025-01-24 DIAGNOSIS — E78.5 DYSLIPIDEMIA: ICD-10-CM

## 2025-01-24 DIAGNOSIS — I44.7 LEFT BUNDLE BRANCH BLOCK (LBBB): Primary | ICD-10-CM

## 2025-01-24 DIAGNOSIS — Z12.31 ENCOUNTER FOR SCREENING MAMMOGRAM FOR BREAST CANCER: ICD-10-CM

## 2025-01-24 DIAGNOSIS — E11.00 TYPE 2 DIABETES MELLITUS WITH HYPEROSMOLARITY WITHOUT COMA, WITHOUT LONG-TERM CURRENT USE OF INSULIN (HCC): ICD-10-CM

## 2025-01-24 DIAGNOSIS — C56.9 MALIGNANT NEOPLASM OF OVARY, UNSPECIFIED LATERALITY (HCC): ICD-10-CM

## 2025-01-24 PROCEDURE — 99214 OFFICE O/P EST MOD 30 MIN: CPT | Performed by: NURSE PRACTITIONER

## 2025-01-24 NOTE — ASSESSMENT & PLAN NOTE
Lab Results   Component Value Date    HGBA1C 7.2 (A) 04/09/2024       Orders:    Hemoglobin A1C    Albumin / creatinine urine ratio; Future    TSH, 3rd generation with Free T4 reflex; Future    Comprehensive metabolic panel; Future    CBC and differential; Future    Will repeat labs with A1C

## 2025-01-24 NOTE — ASSESSMENT & PLAN NOTE
Orders:    TSH, 3rd generation with Free T4 reflex; Future    Comprehensive metabolic panel; Future    CBC and differential; Future    Continues to follow up with GYN oncology having repeat imaging done next week

## 2025-01-24 NOTE — ASSESSMENT & PLAN NOTE
Orders:    TSH, 3rd generation with Free T4 reflex; Future    Comprehensive metabolic panel; Future    CBC and differential; Future    BP slightly up on exam continues to see Cardiology

## 2025-01-24 NOTE — PROGRESS NOTES
Name: Laurie Nielsen      : 1960      MRN: [id number]  Encounter Provider: ANA Mcdaniel  Encounter Date: 2025   Encounter department: [facility] [place]  :  Assessment & Plan  Left bundle branch block (LBBB)    Orders:    TSH, 3rd generation with Free T4 reflex; Future    Comprehensive metabolic panel; Future    CBC and differential; Future    BP slightly up on exam continues to see Cardiology   Malignant neoplasm of ovary, unspecified laterality (HCC)    Orders:    TSH, 3rd generation with Free T4 reflex; Future    Comprehensive metabolic panel; Future    CBC and differential; Future    Continues to follow up with GYN oncology having repeat imaging done next week  Dyslipidemia    Orders:    TSH, 3rd generation with Free T4 reflex; Future    Comprehensive metabolic panel; Future    CBC and differential; Future    Type 2 diabetes mellitus with hyperosmolarity without coma, without long-term current use of insulin (HCC)    Lab Results   Component Value Date    HGBA1C 7.2 (A) 2024       Orders:    Hemoglobin A1C    Albumin / creatinine urine ratio; Future    TSH, 3rd generation with Free T4 reflex; Future    Comprehensive metabolic panel; Future    CBC and differential; Future    Will repeat labs with A1C  Encounter for screening mammogram for breast cancer    Orders:    Mammo screening bilateral w 3d and cad; Future    Will give script for mammogram     Will repeat labs and follow up in 6 months.  History of Present Illness   Laurie is for a follow up visit. She is doing well and is having loss of vision in her right eye. She states this has been going on for months and is now paying for her insurance is trying to get an eye appointment. She does see GYN ONC coming up and Cardiology. She is having repeat labs done. She is deferring the Cologuard. She offers no other issues.      Review of Systems   Eyes:  Positive for visual disturbance.   All other systems reviewed and  "are negative.      Patient's shoes and socks removed.    Right Foot/Ankle   Right Foot Inspection  Skin Exam: skin normal and skin intact. No dry skin, no warmth, no callus, no erythema, no maceration, no abnormal color, no pre-ulcer, no ulcer and no callus.     Toe Exam: ROM and strength within normal limits.     Sensory   Vibration: intact  Proprioception: intact  Monofilament testing: intact    Vascular  Capillary refills: < 3 seconds  The right DP pulse is 2+. The right PT pulse is 2+.     Left Foot/Ankle  Left Foot Inspection  Skin Exam: skin normal and skin intact. No dry skin, no warmth, no erythema, no maceration, normal color, no pre-ulcer, no ulcer and no callus.     Toe Exam: ROM and strength within normal limits.     Sensory   Vibration: intact  Proprioception: intact  Monofilament testing: intact    Vascular  Capillary refills: < 3 seconds  The left DP pulse is 2+. The left PT pulse is 2+.     Assign Risk Category  No deformity present  No loss of protective sensation  No weak pulses  Risk: 0     Objective   BP (!) 195/86   Pulse 84   Temp (!) 96.7 °F (35.9 °C)   Ht 5' 3\" (1.6 m)   Wt 93.6 kg (206 lb 4.8 oz)   SpO2 96%   BMI 36.54 kg/m²      Physical Exam  Vitals reviewed.   Constitutional:       Appearance: Normal appearance. She is obese.   HENT:      Head: Normocephalic and atraumatic.      Right Ear: Tympanic membrane, ear canal and external ear normal.      Left Ear: Tympanic membrane, ear canal and external ear normal.      Nose: Nose normal.      Mouth/Throat:      Mouth: Mucous membranes are moist.      Pharynx: Oropharynx is clear.   Eyes:      Extraocular Movements: Extraocular movements intact.      Conjunctiva/sclera: Conjunctivae normal.      Pupils: Pupils are equal, round, and reactive to light.   Cardiovascular:      Rate and Rhythm: Normal rate and regular rhythm.      Pulses: Normal pulses. no weak pulses.           Dorsalis pedis pulses are 2+ on the right side and 2+ on the " left side.        Posterior tibial pulses are 2+ on the right side and 2+ on the left side.      Heart sounds: Normal heart sounds.   Pulmonary:      Effort: Pulmonary effort is normal.      Breath sounds: Normal breath sounds.   Abdominal:      General: Abdomen is flat. Bowel sounds are normal.      Palpations: Abdomen is soft.   Musculoskeletal:         General: Normal range of motion.      Cervical back: Normal range of motion and neck supple.   Feet:      Right foot:      Skin integrity: No ulcer, skin breakdown, erythema, warmth, callus or dry skin.      Left foot:      Skin integrity: No ulcer, skin breakdown, erythema, warmth, callus or dry skin.   Skin:     General: Skin is warm and dry.      Capillary Refill: Capillary refill takes less than 2 seconds.   Neurological:      General: No focal deficit present.      Mental Status: She is alert and oriented to person, place, and time. Mental status is at baseline.   Psychiatric:         Mood and Affect: Mood normal.         Behavior: Behavior normal.         Thought Content: Thought content normal.         Judgment: Judgment normal.

## 2025-01-27 ENCOUNTER — APPOINTMENT (OUTPATIENT)
Dept: LAB | Facility: HOSPITAL | Age: 65
End: 2025-01-27
Payer: COMMERCIAL

## 2025-01-27 DIAGNOSIS — I44.7 LEFT BUNDLE BRANCH BLOCK (LBBB): ICD-10-CM

## 2025-01-27 DIAGNOSIS — Z85.43 HISTORY OF OVARIAN CANCER: ICD-10-CM

## 2025-01-27 DIAGNOSIS — E11.00 TYPE 2 DIABETES MELLITUS WITH HYPEROSMOLARITY WITHOUT COMA, WITHOUT LONG-TERM CURRENT USE OF INSULIN (HCC): ICD-10-CM

## 2025-01-27 DIAGNOSIS — E78.5 DYSLIPIDEMIA: ICD-10-CM

## 2025-01-27 DIAGNOSIS — C56.9 MALIGNANT NEOPLASM OF OVARY, UNSPECIFIED LATERALITY (HCC): ICD-10-CM

## 2025-01-27 LAB
ALBUMIN SERPL BCG-MCNC: 4.4 G/DL (ref 3.5–5)
ALP SERPL-CCNC: 82 U/L (ref 34–104)
ALT SERPL W P-5'-P-CCNC: 16 U/L (ref 7–52)
ANION GAP SERPL CALCULATED.3IONS-SCNC: 12 MMOL/L (ref 4–13)
AST SERPL W P-5'-P-CCNC: 13 U/L (ref 13–39)
BASOPHILS # BLD AUTO: 0.09 THOUSANDS/ΜL (ref 0–0.1)
BASOPHILS NFR BLD AUTO: 1 % (ref 0–1)
BILIRUB SERPL-MCNC: 0.4 MG/DL (ref 0.2–1)
BUN SERPL-MCNC: 11 MG/DL (ref 5–25)
CALCIUM SERPL-MCNC: 9.1 MG/DL (ref 8.4–10.2)
CHLORIDE SERPL-SCNC: 103 MMOL/L (ref 96–108)
CO2 SERPL-SCNC: 24 MMOL/L (ref 21–32)
CREAT SERPL-MCNC: 0.88 MG/DL (ref 0.6–1.3)
CREAT UR-MCNC: 72.3 MG/DL
EOSINOPHIL # BLD AUTO: 0.24 THOUSAND/ΜL (ref 0–0.61)
EOSINOPHIL NFR BLD AUTO: 2 % (ref 0–6)
ERYTHROCYTE [DISTWIDTH] IN BLOOD BY AUTOMATED COUNT: 15 % (ref 11.6–15.1)
EST. AVERAGE GLUCOSE BLD GHB EST-MCNC: 189 MG/DL
GFR SERPL CREATININE-BSD FRML MDRD: 69 ML/MIN/1.73SQ M
GLUCOSE P FAST SERPL-MCNC: 194 MG/DL (ref 65–99)
HBA1C MFR BLD: 8.2 %
HCT VFR BLD AUTO: 44.7 % (ref 34.8–46.1)
HGB BLD-MCNC: 14 G/DL (ref 11.5–15.4)
IMM GRANULOCYTES # BLD AUTO: 0.02 THOUSAND/UL (ref 0–0.2)
IMM GRANULOCYTES NFR BLD AUTO: 0 % (ref 0–2)
LYMPHOCYTES # BLD AUTO: 3.2 THOUSANDS/ΜL (ref 0.6–4.47)
LYMPHOCYTES NFR BLD AUTO: 29 % (ref 14–44)
MCH RBC QN AUTO: 28 PG (ref 26.8–34.3)
MCHC RBC AUTO-ENTMCNC: 31.3 G/DL (ref 31.4–37.4)
MCV RBC AUTO: 89 FL (ref 82–98)
MICROALBUMIN UR-MCNC: 164.6 MG/L
MICROALBUMIN/CREAT 24H UR: 228 MG/G CREATININE (ref 0–30)
MONOCYTES # BLD AUTO: 0.78 THOUSAND/ΜL (ref 0.17–1.22)
MONOCYTES NFR BLD AUTO: 7 % (ref 4–12)
NEUTROPHILS # BLD AUTO: 6.68 THOUSANDS/ΜL (ref 1.85–7.62)
NEUTS SEG NFR BLD AUTO: 61 % (ref 43–75)
NRBC BLD AUTO-RTO: 0 /100 WBCS
PLATELET # BLD AUTO: 242 THOUSANDS/UL (ref 149–390)
PMV BLD AUTO: 9.5 FL (ref 8.9–12.7)
POTASSIUM SERPL-SCNC: 4.1 MMOL/L (ref 3.5–5.3)
PROT SERPL-MCNC: 6.9 G/DL (ref 6.4–8.4)
RBC # BLD AUTO: 5 MILLION/UL (ref 3.81–5.12)
SODIUM SERPL-SCNC: 139 MMOL/L (ref 135–147)
TSH SERPL DL<=0.05 MIU/L-ACNC: 2.28 UIU/ML (ref 0.45–4.5)
WBC # BLD AUTO: 11.01 THOUSAND/UL (ref 4.31–10.16)

## 2025-01-27 PROCEDURE — 36415 COLL VENOUS BLD VENIPUNCTURE: CPT

## 2025-01-27 PROCEDURE — 84443 ASSAY THYROID STIM HORMONE: CPT

## 2025-01-27 PROCEDURE — 83520 IMMUNOASSAY QUANT NOS NONAB: CPT

## 2025-01-27 PROCEDURE — 80053 COMPREHEN METABOLIC PANEL: CPT

## 2025-01-27 PROCEDURE — 83036 HEMOGLOBIN GLYCOSYLATED A1C: CPT | Performed by: NURSE PRACTITIONER

## 2025-01-27 PROCEDURE — 82043 UR ALBUMIN QUANTITATIVE: CPT

## 2025-01-27 PROCEDURE — 82570 ASSAY OF URINE CREATININE: CPT

## 2025-01-27 PROCEDURE — 85025 COMPLETE CBC W/AUTO DIFF WBC: CPT

## 2025-01-27 PROCEDURE — 86336 INHIBIN A: CPT

## 2025-01-29 DIAGNOSIS — E11.00 TYPE 2 DIABETES MELLITUS WITH HYPEROSMOLARITY WITHOUT COMA, WITHOUT LONG-TERM CURRENT USE OF INSULIN (HCC): ICD-10-CM

## 2025-01-29 RX ORDER — GLIPIZIDE 5 MG/1
5 TABLET ORAL
Qty: 60 TABLET | Refills: 0 | Status: SHIPPED | OUTPATIENT
Start: 2025-01-29

## 2025-01-30 LAB — INHIBIN A SERPL-MCNC: 1.5 PG/ML

## 2025-01-31 LAB — INHIBIN B SERPL-MCNC: <7 PG/ML (ref 0–16.9)

## 2025-02-04 ENCOUNTER — OFFICE VISIT (OUTPATIENT)
Dept: GYNECOLOGIC ONCOLOGY | Facility: CLINIC | Age: 65
End: 2025-02-04
Payer: COMMERCIAL

## 2025-02-04 VITALS
RESPIRATION RATE: 18 BRPM | SYSTOLIC BLOOD PRESSURE: 177 MMHG | BODY MASS INDEX: 35.97 KG/M2 | DIASTOLIC BLOOD PRESSURE: 68 MMHG | TEMPERATURE: 98 F | HEIGHT: 63 IN | HEART RATE: 93 BPM | OXYGEN SATURATION: 96 % | WEIGHT: 203 LBS

## 2025-02-04 DIAGNOSIS — Z85.43 HISTORY OF OVARIAN CANCER: ICD-10-CM

## 2025-02-04 DIAGNOSIS — Z08 ENCOUNTER FOR FOLLOW-UP SURVEILLANCE OF OVARIAN CANCER: Primary | ICD-10-CM

## 2025-02-04 DIAGNOSIS — Z85.43 ENCOUNTER FOR FOLLOW-UP SURVEILLANCE OF OVARIAN CANCER: Primary | ICD-10-CM

## 2025-02-04 PROCEDURE — 99214 OFFICE O/P EST MOD 30 MIN: CPT | Performed by: NURSE PRACTITIONER

## 2025-02-04 PROCEDURE — 99459 PELVIC EXAMINATION: CPT | Performed by: NURSE PRACTITIONER

## 2025-02-04 NOTE — PATIENT INSTRUCTIONS
1. Return to the office in 1 year for continued surveillance with pre-visit labs. Also complete labs in 6 months.  2. Contact the office in the interim if you develop any any new or concerning symptoms, including vaginal bleeding, discharge, pain, etc.

## 2025-02-04 NOTE — ASSESSMENT & PLAN NOTE
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 Patient has been feeling well and has no concerns from a gyn standpoint. Her inhibin A and B levels are normal at 1.5 and <7.0, respectively. Her pelvic exam shows no evidence of recurrent disease. Her PS is 1.    Patient would prefer to schedule f/u visit in 1 year, with repeat labs in 6m and 1y. Orders placed.    Patient also had a CT in March 2022 that showed pulmonary nodules, and f/u imaging was recommended, though not performed. A new order was entered.    Patient is aware of warning signs of cancer recurrence and when to call the office.    Orders:    Inhibin A; Standing    Inhibin B; Standing    CT chest wo contrast; Future

## 2025-02-04 NOTE — PROGRESS NOTES
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 Patient has been feeling well and has no concerns from a gyn standpoint. Her inhibin A and B levels are normal at 1.5 and <7.0, respectively. Her pelvic exam shows no evidence of recurrent disease. Her PS is 1.    Patient would prefer to schedule f/u visit in 1 year, with repeat labs in 6m and 1y. Orders placed.    Patient also had a CT in 2022 that showed pulmonary nodules, and f/u imaging was recommended, though not performed. A new order was entered.    Patient is aware of warning signs of cancer recurrence and when to call the office.    Orders:    Inhibin A; Standing    Inhibin B; Standing    CT chest wo contrast; Future    History of ovarian cancer    Orders:    Inhibin A; Standing    Inhibin B; Standing    CT chest wo contrast; Future            History of Present Illness   Reason for Visit / CC: Ovarian cancer surveillance       Laurie Nielsen is a 64 y.o. female   Laurie presents to the office for ovarian cancer surveillance. She has no new gynecologic concerns since her last visit. She is having vision issues with her right eye, and will be having cataract surgery in the near future. She denies nausea or vomiting. Her appetite is appropriate. Normal bowel and bladder function. She denies abdominal or pelvic pain. She is without vaginal bleeding or discharge. She is ambulatory.    Overdue for mammogram; patient deferred colonoscopy.        Pertinent Medical History      Oncology History   Cancer Staging   Encounter for follow-up surveillance of ovarian cancer  Staging form: Ovary, Fallopian Tube, Primary  Peritoneal, AJCC 8th Edition  - Clinical: FIGO Stage IC2, calculated as Stage IC (cT1c2, cN0, cM0) - Signed by Manuel Marc MD on 3/7/2022  - Pathologic stage from 3/7/2022: FIGO Stage IC2, calculated as Stage Unknown (pT1c2, pNX, cM0) - Signed by Manuel Marc MD on 3/7/2022  Histopathologic type: Granulosa cell tumor, adult type  Stage prefix: Initial diagnosis  Histologic grade (G): G1  Histologic grading system: 4 grade system  Residual tumor (R): R0 - None  Oncology History   Encounter for follow-up surveillance of ovarian cancer   12/2021 Surgery    [facility] for fibroid uterus of 24 weeks with an incidental finding of a 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  Surgery performed by Elizabeth Agosto     3/7/2022 Initial Diagnosis    Ovarian cancer (HCC)     3/7/2022 -  Cancer Staged    Staging form: Ovary, Fallopian Tube, Primary Peritoneal, AJCC 8th Edition  - Clinical: FIGO Stage IC2, calculated as Stage IC (cT1c2, cN0, cM0) - Signed by Manuel Marc MD on 3/7/2022       3/7/2022 -  Cancer Staged    Staging form: Ovary, Fallopian Tube, Primary Peritoneal, AJCC 8th Edition  - Pathologic stage from 3/7/2022: FIGO Stage IC2, calculated as Stage Unknown (pT1c2, pNX, cM0) - Signed by Manuel Marc MD on 3/7/2022  Histopathologic type: Granulosa cell tumor, adult type  Stage prefix: Initial diagnosis  Histologic grade (G): G1  Histologic grading system: 4 grade system  Residual tumor (R): R0 - None          Review of Systems   Constitutional:  Negative for chills, fatigue, fever and unexpected weight change.   HENT:  Negative for nosebleeds.    Eyes: Negative.    Respiratory:  Negative for cough, chest tightness, shortness of breath and wheezing.    Cardiovascular:  Negative for chest pain, palpitations and leg swelling.   Gastrointestinal:  Negative for abdominal distention, abdominal pain, anal bleeding, blood in stool, constipation, diarrhea, nausea, rectal pain and vomiting.  "  Endocrine: Negative.    Genitourinary:  Negative for difficulty urinating, dysuria, frequency, hematuria, pelvic pain, urgency, vaginal bleeding, vaginal discharge and vaginal pain.   Musculoskeletal:  Negative for arthralgias and joint swelling.   Skin:  Negative for color change, pallor and rash.   Neurological:  Negative for dizziness, weakness, light-headedness, numbness and headaches.   Hematological: Negative.    Psychiatric/Behavioral: Negative.      A complete review of systems is negative other than that noted above in the HPI.       Objective   BP (!) 177/68 (BP Location: Left arm, Patient Position: Sitting, Cuff Size: Large)   Pulse 93   Temp 98 °F (36.7 °C)   Resp 18   Ht 5' 3\" (1.6 m)   Wt 92.1 kg (203 lb)   SpO2 96%   BMI 35.96 kg/m²     Body mass index is 35.96 kg/m².  Pain Screening:  Pain Score: 0-No pain  ECOG   1    Physical Exam  Vitals reviewed. Exam conducted with a chaperone present.   Constitutional:       General: She is not in acute distress.     Appearance: Normal appearance. She is obese. She is not ill-appearing.   HENT:      Head: Normocephalic and atraumatic.      Mouth/Throat:      Mouth: Mucous membranes are moist.   Eyes:      General:         Right eye: No discharge.         Left eye: No discharge.      Conjunctiva/sclera: Conjunctivae normal.   Pulmonary:      Effort: Pulmonary effort is normal.   Abdominal:      Palpations: Abdomen is soft. There is no mass.      Tenderness: There is no abdominal tenderness.      Hernia: No hernia is present.   Genitourinary:     Comments: The external female genitalia is normal. The bartholin's, uretheral and skenes glands are normal. The urethral meatus is normal (midline with no lesions). Anus without fissure or lesion. Speculum exam reveals a grossly normal vagina. No masses, lesions,discharge or bleeding. No significant cystocele or rectocele noted. Bimanual exam notes a surgical absent cervix, uterus and adnexal structures. No " "masses or fullness. Bladder is without fullness, mass or tenderness.  Musculoskeletal:      Right lower leg: No edema.      Left lower leg: No edema.   Skin:     General: Skin is warm and dry.      Coloration: Skin is not jaundiced.      Findings: No rash.   Neurological:      General: No focal deficit present.      Mental Status: She is alert and oriented to person, place, and time.      Cranial Nerves: No cranial nerve deficit.      Sensory: No sensory deficit.      Motor: No weakness.      Gait: Gait normal.   Psychiatric:         Mood and Affect: Mood normal.         Behavior: Behavior normal.         Thought Content: Thought content normal.         Judgment: Judgment normal.          Labs: I have reviewed pertinent labs. Inhibin A/B:   Lab Results   Component Value Date/Time    Inhibin A 1.5 01/27/2025 12:52 PM    Inhibin B <7.0 01/27/2025 12:52 PM      No results found for: \"\"  Lab Results   Component Value Date/Time    Potassium 4.1 01/27/2025 12:52 PM    Chloride 103 01/27/2025 12:52 PM    CO2 24 01/27/2025 12:52 PM    BUN 11 01/27/2025 12:52 PM    Creatinine 0.88 01/27/2025 12:52 PM    Glucose, Fasting 194 (H) 01/27/2025 12:52 PM    Calcium 9.1 01/27/2025 12:52 PM    AST 13 01/27/2025 12:52 PM    ALT 16 01/27/2025 12:52 PM    Alkaline Phosphatase 82 01/27/2025 12:52 PM    eGFR 69 01/27/2025 12:52 PM     Lab Results   Component Value Date/Time    WBC 11.01 (H) 01/27/2025 12:52 PM    Hemoglobin 14.0 01/27/2025 12:52 PM    Hematocrit 44.7 01/27/2025 12:52 PM    MCV 89 01/27/2025 12:52 PM    Platelets 242 01/27/2025 12:52 PM     Lab Results   Component Value Date/Time    Absolute Neutrophils 6.68 01/27/2025 12:52 PM        Trend:  Lab Results   Component Value Date     6.1 01/19/2022               "

## 2025-02-09 DIAGNOSIS — E11.00 TYPE 2 DIABETES MELLITUS WITH HYPEROSMOLARITY WITHOUT COMA, WITHOUT LONG-TERM CURRENT USE OF INSULIN (HCC): ICD-10-CM

## 2025-03-02 DIAGNOSIS — E11.00 TYPE 2 DIABETES MELLITUS WITH HYPEROSMOLARITY WITHOUT COMA, WITHOUT LONG-TERM CURRENT USE OF INSULIN (HCC): ICD-10-CM

## 2025-03-05 DIAGNOSIS — E11.00 TYPE 2 DIABETES MELLITUS WITH HYPEROSMOLARITY WITHOUT COMA, WITHOUT LONG-TERM CURRENT USE OF INSULIN (HCC): ICD-10-CM

## 2025-03-06 RX ORDER — GLIPIZIDE 5 MG/1
5 TABLET ORAL
Qty: 60 TABLET | Refills: 5 | Status: SHIPPED | OUTPATIENT
Start: 2025-03-06

## 2025-03-09 DIAGNOSIS — E11.00 TYPE 2 DIABETES MELLITUS WITH HYPEROSMOLARITY WITHOUT COMA, WITHOUT LONG-TERM CURRENT USE OF INSULIN (HCC): ICD-10-CM

## 2025-03-10 RX ORDER — GLIPIZIDE 5 MG/1
5 TABLET ORAL
Qty: 60 TABLET | Refills: 0 | OUTPATIENT
Start: 2025-03-10

## 2025-03-13 ENCOUNTER — TELEPHONE (OUTPATIENT)
Dept: CARDIOLOGY CLINIC | Facility: HOSPITAL | Age: 65
End: 2025-03-13

## 2025-04-05 DIAGNOSIS — H04.129 DRY EYE: ICD-10-CM

## 2025-04-05 DIAGNOSIS — H57.89 EYE DRAINAGE: ICD-10-CM

## 2025-04-07 RX ORDER — CETIRIZINE HYDROCHLORIDE 10 MG/1
10 TABLET ORAL DAILY
Qty: 30 TABLET | Refills: 5 | Status: SHIPPED | OUTPATIENT
Start: 2025-04-07

## 2025-04-25 DIAGNOSIS — I10 ESSENTIAL HYPERTENSION: ICD-10-CM

## 2025-04-25 RX ORDER — LOSARTAN POTASSIUM 100 MG/1
100 TABLET ORAL DAILY
Qty: 90 TABLET | Refills: 3 | Status: SHIPPED | OUTPATIENT
Start: 2025-04-25

## 2025-05-03 DIAGNOSIS — I10 ESSENTIAL HYPERTENSION: ICD-10-CM

## 2025-05-05 RX ORDER — LOSARTAN POTASSIUM 100 MG/1
100 TABLET ORAL DAILY
Qty: 90 TABLET | Refills: 0 | OUTPATIENT
Start: 2025-05-05

## 2025-07-08 DIAGNOSIS — E78.5 DYSLIPIDEMIA: ICD-10-CM

## 2025-07-08 DIAGNOSIS — E11.9 TYPE 2 DIABETES MELLITUS WITHOUT COMPLICATION, WITHOUT LONG-TERM CURRENT USE OF INSULIN (HCC): ICD-10-CM

## 2025-07-10 RX ORDER — LANCETS 33 GAUGE
1 EACH MISCELLANEOUS 3 TIMES DAILY
Qty: 300 EACH | Refills: 1 | Status: SHIPPED | OUTPATIENT
Start: 2025-07-10

## 2025-07-10 RX ORDER — ROSUVASTATIN CALCIUM 40 MG/1
40 TABLET, COATED ORAL DAILY
Qty: 30 TABLET | Refills: 6 | Status: SHIPPED | OUTPATIENT
Start: 2025-07-10

## 2025-07-10 RX ORDER — BLOOD SUGAR DIAGNOSTIC
STRIP MISCELLANEOUS
Qty: 300 STRIP | Refills: 1 | Status: SHIPPED | OUTPATIENT
Start: 2025-07-10

## 2025-07-23 DIAGNOSIS — E11.00 TYPE 2 DIABETES MELLITUS WITH HYPEROSMOLARITY WITHOUT COMA, WITHOUT LONG-TERM CURRENT USE OF INSULIN (HCC): ICD-10-CM

## 2025-07-23 DIAGNOSIS — J01.10 ACUTE NON-RECURRENT FRONTAL SINUSITIS: ICD-10-CM

## 2025-07-23 DIAGNOSIS — I10 ESSENTIAL HYPERTENSION: ICD-10-CM

## 2025-07-23 DIAGNOSIS — H04.129 DRY EYE: ICD-10-CM

## 2025-07-23 DIAGNOSIS — E78.5 DYSLIPIDEMIA: ICD-10-CM

## 2025-07-23 DIAGNOSIS — I25.10 ATHEROSCLEROSIS OF NATIVE CORONARY ARTERY OF NATIVE HEART WITHOUT ANGINA PECTORIS: ICD-10-CM

## 2025-07-23 DIAGNOSIS — E11.9 TYPE 2 DIABETES MELLITUS WITHOUT COMPLICATION, WITHOUT LONG-TERM CURRENT USE OF INSULIN (HCC): ICD-10-CM

## 2025-07-23 DIAGNOSIS — H57.89 EYE DRAINAGE: ICD-10-CM

## 2025-07-23 DIAGNOSIS — I10 BENIGN ESSENTIAL HYPERTENSION: ICD-10-CM

## 2025-07-25 RX ORDER — FLUTICASONE PROPIONATE 50 MCG
1 SPRAY, SUSPENSION (ML) NASAL DAILY
Qty: 16 ML | Refills: 2 | Status: SHIPPED | OUTPATIENT
Start: 2025-07-25

## 2025-07-25 RX ORDER — CETIRIZINE HYDROCHLORIDE 10 MG/1
10 TABLET ORAL DAILY
Qty: 30 TABLET | Refills: 5 | Status: SHIPPED | OUTPATIENT
Start: 2025-07-25

## 2025-07-25 RX ORDER — NITROGLYCERIN 0.4 MG/1
0.4 TABLET SUBLINGUAL
Qty: 30 TABLET | Refills: 0 | Status: SHIPPED | OUTPATIENT
Start: 2025-07-25

## 2025-07-25 RX ORDER — LOSARTAN POTASSIUM 100 MG/1
100 TABLET ORAL DAILY
Qty: 90 TABLET | Refills: 0 | Status: SHIPPED | OUTPATIENT
Start: 2025-07-25

## 2025-07-25 RX ORDER — CARVEDILOL 25 MG/1
25 TABLET ORAL 2 TIMES DAILY WITH MEALS
Qty: 180 TABLET | Refills: 0 | Status: SHIPPED | OUTPATIENT
Start: 2025-07-25

## 2025-07-25 RX ORDER — BLOOD SUGAR DIAGNOSTIC
STRIP MISCELLANEOUS
Qty: 300 STRIP | Refills: 0 | Status: SHIPPED | OUTPATIENT
Start: 2025-07-25

## 2025-07-25 RX ORDER — ROSUVASTATIN CALCIUM 40 MG/1
40 TABLET, COATED ORAL DAILY
Qty: 90 TABLET | Refills: 0 | Status: SHIPPED | OUTPATIENT
Start: 2025-07-25

## 2025-07-25 RX ORDER — LANCETS 33 GAUGE
1 EACH MISCELLANEOUS 3 TIMES DAILY
Qty: 300 EACH | Refills: 1 | Status: SHIPPED | OUTPATIENT
Start: 2025-07-25

## 2025-07-25 RX ORDER — GLIPIZIDE 5 MG/1
5 TABLET ORAL
Qty: 60 TABLET | Refills: 5 | Status: SHIPPED | OUTPATIENT
Start: 2025-07-25

## (undated) DEVICE — PROXIMATE SKIN STAPLERS (35 WIDE) CONTAINS 35 STAINLESS STEEL STAPLES (FIXED HEAD): Brand: PROXIMATE

## (undated) DEVICE — IV EXTENSION TUBING 33 IN

## (undated) DEVICE — SUT VICRYL 0 CT-1 CR/8 27 IN JJ41G

## (undated) DEVICE — ASTOUND STANDARD SURGICAL GOWN, XL: Brand: CONVERTORS

## (undated) DEVICE — DRAPE FLUID WARMER (BIRD BATH)

## (undated) DEVICE — 2000CC GUARDIAN II: Brand: GUARDIAN

## (undated) DEVICE — PREMIUM DRY TRAY LF: Brand: MEDLINE INDUSTRIES, INC.

## (undated) DEVICE — SUT STRATAFIX SPIRAL 4-0 PGA/PCL 30 X 30 CM SXMD2B409

## (undated) DEVICE — TIBURON TRANSVERSE LAPAROTOMY SHEET: Brand: CONVERTORS

## (undated) DEVICE — INTENDED FOR TISSUE SEPARATION, AND OTHER PROCEDURES THAT REQUIRE A SHARP SURGICAL BLADE TO PUNCTURE OR CUT.: Brand: BARD-PARKER SAFETY BLADES SIZE 10, STERILE

## (undated) DEVICE — BETHLEHEM UNIVERSAL LAPAROTOMY: Brand: CARDINAL HEALTH

## (undated) DEVICE — INTENDED FOR TISSUE SEPARATION, AND OTHER PROCEDURES THAT REQUIRE A SHARP SURGICAL BLADE TO PUNCTURE OR CUT.: Brand: BARD-PARKER SAFETY BLADES SIZE 15, STERILE

## (undated) DEVICE — ENSEAL 20 CM SHAFT, LARGE JAW: Brand: ENSEAL X1

## (undated) DEVICE — DRAPE EQUIPMENT RF WAND

## (undated) DEVICE — MAYO STAND COVER: Brand: CONVERTORS

## (undated) DEVICE — GLOVE INDICATOR PI UNDERGLOVE SZ 7 BLUE

## (undated) DEVICE — SUT VICRYL 0 CT-1 36 IN J946H

## (undated) DEVICE — TRAY FOLEY 16FR URIMETER SILICONE SURESTEP

## (undated) DEVICE — GLOVE PI ULTRA TOUCH SZ.7.0

## (undated) DEVICE — SCD SEQUENTIAL COMPRESSION COMFORT SLEEVE MEDIUM KNEE LENGTH: Brand: KENDALL SCD

## (undated) DEVICE — SUT VICRYL 3-0 SH 27 IN J416H

## (undated) DEVICE — SURGICEL 2 X 14

## (undated) DEVICE — SUT VICRYL 3-0 REEL 54 IN J285G

## (undated) DEVICE — PENCIL ELECTROSURG E-Z CLEAN -0035H

## (undated) DEVICE — SYRINGE 50ML LL

## (undated) DEVICE — SPONGE LAP 18 X 18 IN STRL RFD